# Patient Record
Sex: FEMALE | Race: WHITE | NOT HISPANIC OR LATINO | Employment: OTHER | URBAN - METROPOLITAN AREA
[De-identification: names, ages, dates, MRNs, and addresses within clinical notes are randomized per-mention and may not be internally consistent; named-entity substitution may affect disease eponyms.]

---

## 2017-01-12 ENCOUNTER — TELEPHONE (OUTPATIENT)
Dept: PAIN MEDICINE | Facility: CLINIC | Age: 72
End: 2017-01-12

## 2017-01-20 ENCOUNTER — OFFICE VISIT (OUTPATIENT)
Dept: FAMILY MEDICINE CLINIC | Age: 72
End: 2017-01-20
Payer: MEDICARE

## 2017-01-20 VITALS
BODY MASS INDEX: 35.87 KG/M2 | RESPIRATION RATE: 18 BRPM | DIASTOLIC BLOOD PRESSURE: 70 MMHG | OXYGEN SATURATION: 95 % | WEIGHT: 190 LBS | SYSTOLIC BLOOD PRESSURE: 130 MMHG | HEART RATE: 87 BPM | HEIGHT: 61 IN

## 2017-01-20 DIAGNOSIS — M54.5 CHRONIC MIDLINE LOW BACK PAIN, WITH SCIATICA PRESENCE UNSPECIFIED: ICD-10-CM

## 2017-01-20 DIAGNOSIS — G89.29 CHRONIC MIDLINE LOW BACK PAIN, WITH SCIATICA PRESENCE UNSPECIFIED: ICD-10-CM

## 2017-01-20 DIAGNOSIS — I10 ESSENTIAL HYPERTENSION: ICD-10-CM

## 2017-01-20 DIAGNOSIS — G89.29 CHRONIC PAIN OF BOTH KNEES: Primary | ICD-10-CM

## 2017-01-20 DIAGNOSIS — M25.562 CHRONIC PAIN OF BOTH KNEES: Primary | ICD-10-CM

## 2017-01-20 DIAGNOSIS — M25.561 CHRONIC PAIN OF BOTH KNEES: Primary | ICD-10-CM

## 2017-01-20 DIAGNOSIS — R53.83 FATIGUE, UNSPECIFIED TYPE: ICD-10-CM

## 2017-01-20 PROCEDURE — G8484 FLU IMMUNIZE NO ADMIN: HCPCS | Performed by: FAMILY MEDICINE

## 2017-01-20 PROCEDURE — 3014F SCREEN MAMMO DOC REV: CPT | Performed by: FAMILY MEDICINE

## 2017-01-20 PROCEDURE — G8399 PT W/DXA RESULTS DOCUMENT: HCPCS | Performed by: FAMILY MEDICINE

## 2017-01-20 PROCEDURE — 4040F PNEUMOC VAC/ADMIN/RCVD: CPT | Performed by: FAMILY MEDICINE

## 2017-01-20 PROCEDURE — 99214 OFFICE O/P EST MOD 30 MIN: CPT | Performed by: FAMILY MEDICINE

## 2017-01-20 PROCEDURE — 3017F COLORECTAL CA SCREEN DOC REV: CPT | Performed by: FAMILY MEDICINE

## 2017-01-20 PROCEDURE — 1090F PRES/ABSN URINE INCON ASSESS: CPT | Performed by: FAMILY MEDICINE

## 2017-01-20 PROCEDURE — 1036F TOBACCO NON-USER: CPT | Performed by: FAMILY MEDICINE

## 2017-01-20 PROCEDURE — 1123F ACP DISCUSS/DSCN MKR DOCD: CPT | Performed by: FAMILY MEDICINE

## 2017-01-20 PROCEDURE — G8419 CALC BMI OUT NRM PARAM NOF/U: HCPCS | Performed by: FAMILY MEDICINE

## 2017-01-20 PROCEDURE — G8427 DOCREV CUR MEDS BY ELIG CLIN: HCPCS | Performed by: FAMILY MEDICINE

## 2017-01-23 ENCOUNTER — HOSPITAL ENCOUNTER (OUTPATIENT)
Dept: OTHER | Age: 72
Discharge: HOME | End: 2017-01-23
Attending: FAMILY MEDICINE | Admitting: FAMILY MEDICINE

## 2017-01-23 DIAGNOSIS — I10 ESSENTIAL HYPERTENSION: ICD-10-CM

## 2017-01-23 LAB
A/G RATIO: 1.7 (ref 0.8–2)
ALBUMIN SERPL-MCNC: 4.4 G/DL (ref 3.4–4.8)
ALP BLD-CCNC: 72 U/L (ref 25–100)
ALT SERPL-CCNC: 15 U/L (ref 4–36)
ANION GAP SERPL CALCULATED.3IONS-SCNC: 10 MMOL/L (ref 3–16)
AST SERPL-CCNC: 17 U/L (ref 8–33)
BASOPHILS ABSOLUTE: 0 K/UL (ref 0–0.1)
BASOPHILS RELATIVE PERCENT: 0.3 %
BILIRUB SERPL-MCNC: 0.5 MG/DL (ref 0.3–1.2)
BUN BLDV-MCNC: 17 MG/DL (ref 6–20)
CALCIUM SERPL-MCNC: 10 MG/DL (ref 8.5–10.5)
CHLORIDE BLD-SCNC: 99 MMOL/L (ref 98–107)
CO2: 31 MMOL/L (ref 20–30)
CREAT SERPL-MCNC: 0.9 MG/DL (ref 0.4–1.2)
EOSINOPHILS ABSOLUTE: 0.2 K/UL (ref 0–0.4)
EOSINOPHILS RELATIVE PERCENT: 2.7 %
GFR AFRICAN AMERICAN: >59
GFR NON-AFRICAN AMERICAN: >60
GLOBULIN: 2.6 G/DL
GLUCOSE BLD-MCNC: 99 MG/DL (ref 74–106)
HCT VFR BLD CALC: 40.8 % (ref 37–47)
HEMOGLOBIN: 13.1 G/DL (ref 11.5–16.5)
LYMPHOCYTES ABSOLUTE: 2 K/UL (ref 1.5–4)
LYMPHOCYTES RELATIVE PERCENT: 32.4 % (ref 20–40)
MCH RBC QN AUTO: 31.1 PG (ref 27–32)
MCHC RBC AUTO-ENTMCNC: 32.1 G/DL (ref 31–35)
MCV RBC AUTO: 96.9 FL (ref 80–100)
MONOCYTES ABSOLUTE: 0.6 K/UL (ref 0.2–0.8)
MONOCYTES RELATIVE PERCENT: 9.1 % (ref 3–10)
NEUTROPHILS ABSOLUTE: 3.5 K/UL (ref 2–7.5)
NEUTROPHILS RELATIVE PERCENT: 55.5 %
PDW BLD-RTO: 14.5 % (ref 11–16)
PLATELET # BLD: 218 K/UL (ref 150–400)
PMV BLD AUTO: 11.5 FL (ref 6–10)
POTASSIUM SERPL-SCNC: 4.9 MMOL/L (ref 3.4–5.1)
RBC # BLD: 4.21 M/UL (ref 3.8–5.8)
SODIUM BLD-SCNC: 140 MMOL/L (ref 136–145)
TOTAL PROTEIN: 7 G/DL (ref 6.4–8.3)
WBC # BLD: 6.2 K/UL (ref 4–11)

## 2017-01-24 ENCOUNTER — OFFICE VISIT (OUTPATIENT)
Dept: PAIN MEDICINE | Facility: CLINIC | Age: 72
End: 2017-01-24

## 2017-01-24 VITALS
DIASTOLIC BLOOD PRESSURE: 67 MMHG | RESPIRATION RATE: 18 BRPM | HEIGHT: 61 IN | BODY MASS INDEX: 35.3 KG/M2 | OXYGEN SATURATION: 96 % | TEMPERATURE: 98 F | HEART RATE: 76 BPM | WEIGHT: 187 LBS | SYSTOLIC BLOOD PRESSURE: 114 MMHG

## 2017-01-24 DIAGNOSIS — E66.8 MODERATE OBESITY: ICD-10-CM

## 2017-01-24 DIAGNOSIS — M81.0 OSTEOPOROSIS: ICD-10-CM

## 2017-01-24 DIAGNOSIS — M48.062 SPINAL STENOSIS, LUMBAR REGION, WITH NEUROGENIC CLAUDICATION: ICD-10-CM

## 2017-01-24 DIAGNOSIS — M05.9 RHEUMATOID ARTHRITIS WITH POSITIVE RHEUMATOID FACTOR, INVOLVING UNSPECIFIED SITE (HCC): ICD-10-CM

## 2017-01-24 DIAGNOSIS — M35.3 POLYMYALGIA RHEUMATICA (HCC): ICD-10-CM

## 2017-01-24 DIAGNOSIS — M47.816 SPONDYLOSIS OF LUMBAR REGION WITHOUT MYELOPATHY OR RADICULOPATHY: ICD-10-CM

## 2017-01-24 DIAGNOSIS — M21.70 LEG LENGTH DISCREPANCY: ICD-10-CM

## 2017-01-24 DIAGNOSIS — M47.812 CERVICAL SPONDYLOSIS WITHOUT MYELOPATHY: ICD-10-CM

## 2017-01-24 DIAGNOSIS — J44.9 CHRONIC OBSTRUCTIVE PULMONARY DISEASE, UNSPECIFIED COPD TYPE (HCC): ICD-10-CM

## 2017-01-24 DIAGNOSIS — R53.81 PHYSICAL DECONDITIONING: ICD-10-CM

## 2017-01-24 DIAGNOSIS — Z87.81 HISTORY OF COMPRESSION FRACTURE OF VERTEBRAL COLUMN: ICD-10-CM

## 2017-01-24 PROBLEM — E66.9 MODERATE OBESITY: Status: ACTIVE | Noted: 2017-01-24

## 2017-01-24 PROCEDURE — 95972 ALYS CPLX SP/PN NPGT W/PRGRM: CPT | Performed by: ANESTHESIOLOGY

## 2017-01-24 PROCEDURE — 99214 OFFICE O/P EST MOD 30 MIN: CPT | Performed by: ANESTHESIOLOGY

## 2017-01-24 RX ORDER — CARIPRAZINE 3 MG/1
CAPSULE, GELATIN COATED ORAL
COMMUNITY
Start: 2016-12-19 | End: 2022-02-09

## 2017-01-24 RX ORDER — DULOXETIN HYDROCHLORIDE 60 MG/1
CAPSULE, DELAYED RELEASE ORAL DAILY
COMMUNITY
Start: 2014-08-21 | End: 2017-01-24 | Stop reason: SDUPTHER

## 2017-01-24 RX ORDER — ALBUTEROL SULFATE 2.5 MG/3ML
SOLUTION RESPIRATORY (INHALATION)
COMMUNITY
Start: 2014-04-09

## 2017-01-24 RX ORDER — MULTIVIT-MIN/IRON/FOLIC ACID/K 18-600-40
CAPSULE ORAL
COMMUNITY
Start: 2013-11-04 | End: 2017-01-24

## 2017-01-24 RX ORDER — NAPROXEN SODIUM 220 MG
TABLET ORAL
COMMUNITY
Start: 2014-06-03 | End: 2017-01-24

## 2017-01-24 RX ORDER — ZOLEDRONIC ACID 5 MG/100ML
5 INJECTION, SOLUTION INTRAVENOUS ONCE
Status: ON HOLD | COMMUNITY
End: 2022-04-06

## 2017-01-24 RX ORDER — NEBIVOLOL HYDROCHLORIDE 10 MG/1
TABLET ORAL
COMMUNITY
Start: 2016-11-27 | End: 2022-02-09

## 2017-01-24 RX ORDER — PRAMIPEXOLE DIHYDROCHLORIDE 0.12 MG/1
TABLET ORAL
Status: ON HOLD | COMMUNITY
Start: 2016-11-21 | End: 2022-04-06

## 2017-01-24 RX ORDER — CYCLOBENZAPRINE HCL 10 MG
TABLET ORAL
COMMUNITY
Start: 2016-12-19 | End: 2022-02-09

## 2017-01-24 RX ORDER — GABAPENTIN 400 MG/1
CAPSULE ORAL
COMMUNITY
Start: 2016-11-06 | End: 2022-02-09

## 2017-01-24 RX ORDER — MONTELUKAST SODIUM 10 MG/1
TABLET ORAL DAILY
COMMUNITY
Start: 2013-11-04 | End: 2017-01-24

## 2017-01-24 RX ORDER — PHENOL 1.4 %
AEROSOL, SPRAY (ML) MUCOUS MEMBRANE
COMMUNITY
Start: 2013-11-04

## 2017-01-24 RX ORDER — HYDROCODONE BITARTRATE AND ACETAMINOPHEN 7.5; 325 MG/1; MG/1
TABLET ORAL
COMMUNITY
Start: 2016-12-19 | End: 2022-02-09

## 2017-01-24 RX ORDER — ALPRAZOLAM 1 MG/1
TABLET ORAL
COMMUNITY
Start: 2013-11-04 | End: 2017-01-24

## 2017-01-24 RX ORDER — DULOXETIN HYDROCHLORIDE 60 MG/1
CAPSULE, DELAYED RELEASE ORAL
COMMUNITY
Start: 2016-11-12 | End: 2023-03-16

## 2017-01-24 RX ORDER — TOFACITINIB 11 MG/1
TABLET, FILM COATED, EXTENDED RELEASE ORAL
COMMUNITY
Start: 2016-11-11 | End: 2017-01-24

## 2017-01-24 RX ORDER — TRAMADOL HYDROCHLORIDE 50 MG/1
TABLET ORAL
COMMUNITY
Start: 2015-01-13 | End: 2017-01-24

## 2017-01-24 RX ORDER — ESOMEPRAZOLE MAGNESIUM 40 MG/1
CAPSULE, DELAYED RELEASE ORAL 2 TIMES DAILY
COMMUNITY
Start: 2013-11-04 | End: 2017-01-24

## 2017-01-24 RX ORDER — GABAPENTIN 300 MG/1
CAPSULE ORAL 4 TIMES DAILY
COMMUNITY
Start: 2013-11-04 | End: 2017-01-24 | Stop reason: DRUGHIGH

## 2017-01-24 RX ORDER — TIZANIDINE 2 MG/1
TABLET ORAL
COMMUNITY
Start: 2015-02-03 | End: 2017-01-24

## 2017-01-24 RX ORDER — MONTELUKAST SODIUM 10 MG/1
TABLET ORAL
Status: ON HOLD | COMMUNITY
Start: 2016-12-17 | End: 2022-04-06

## 2017-01-24 RX ORDER — FOLIC ACID 1 MG/1
TABLET ORAL
Status: ON HOLD | COMMUNITY
Start: 2017-01-22 | End: 2022-04-06

## 2017-01-24 RX ORDER — FUROSEMIDE 20 MG/1
TABLET ORAL
COMMUNITY
Start: 2016-12-06 | End: 2023-03-16

## 2017-01-24 NOTE — MR AVS SNAPSHOT
Lisa Crowley   1/24/2017 2:00 PM   Office Visit    Dept Phone:  [phone]   Encounter #:  [id number]    Provider:  Hugh Martinez MD   Department:  [facility]                Your Full Care Plan              Today's Medication Changes          These changes are accurate as of: 1/24/17  2:48 PM.  If you have any questions, ask your nurse or doctor.               Medication(s)that have changed:     DULoxetine 60 MG capsule   Commonly known as:  CYMBALTA   What changed:  Another medication with the same name was removed. Continue taking this medication, and follow the directions you see here.   Changed by:  Jennifer Nur MA       gabapentin 400 MG capsule   Commonly known as:  NEURONTIN   What changed:  Another medication with the same name was removed. Continue taking this medication, and follow the directions you see here.   Changed by:  Jennifer Nur MA       montelukast 10 MG tablet   Commonly known as:  SINGULAIR   What changed:  Another medication with the same name was removed. Continue taking this medication, and follow the directions you see here.   Changed by:  Jennifer Nur MA         Stop taking medication(s)listed here:     ALEVE 220 MG tablet   Generic drug:  naproxen sodium   Stopped by:  Hugh Martinez MD           ALPRAZolam 1 MG tablet   Commonly known as:  XANAX   Stopped by:  Hugh Martinez MD           NEXIUM 40 MG capsule   Generic drug:  esomeprazole   Stopped by:  Hugh Martinez MD           tiZANidine 2 MG tablet   Commonly known as:  ZANAFLEX   Stopped by:  Hugh Martinez MD           traMADol 50 MG tablet   Commonly known as:  ULTRAM   Stopped by:  Hugh Martinez MD           Vitamin D 2000 UNITS capsule   Stopped by:  Hugh Martinez MD           XELJANZ XR 11 MG tablet sustained-release 24 hour   Generic drug:  Tofacitinib Citrate   Stopped by:  Hugh Martinez MD                      Your Updated  Medication List          This list is accurate as of: 1/24/17  2:48 PM.  Always use your most recent med list.                albuterol (2.5 MG/3ML) 0.083% nebulizer solution   Commonly known as:  PROVENTIL       buPROPion  MG 12 hr tablet   Commonly known as:  WELLBUTRIN SR       BYSTOLIC 10 MG tablet   Generic drug:  nebivolol       CALCIUM 600 600 MG tablet   Generic drug:  calcium carbonate       cyclobenzaprine 10 MG tablet   Commonly known as:  FLEXERIL       diclofenac 1 % gel gel   Commonly known as:  VOLTAREN       DULoxetine 60 MG capsule   Commonly known as:  CYMBALTA       folic acid 1 MG tablet   Commonly known as:  FOLVITE       furosemide 20 MG tablet   Commonly known as:  LASIX       gabapentin 400 MG capsule   Commonly known as:  NEURONTIN       HYDROcodone-acetaminophen 7.5-325 MG per tablet   Commonly known as:  NORCO       methotrexate 2.5 MG tablet       montelukast 10 MG tablet   Commonly known as:  SINGULAIR       O2   Commonly known as:  OXYGEN       pramipexole 0.125 MG tablet   Commonly known as:  MIRAPEX       RECLAST 5 MG/100ML solution infusion   Generic drug:  zoledronic acid       tiotropium 18 MCG per inhalation capsule   Commonly known as:  SPIRIVA       VRAYLAR 3 MG capsule   Generic drug:  Cariprazine HCl               You Were Diagnosed With        Codes Comments    Spinal stenosis, lumbar region, with neurogenic claudication     ICD-10-CM: M48.06  ICD-9-CM: 724.03     Spondylosis of lumbar region without myelopathy or radiculopathy     ICD-10-CM: M47.816  ICD-9-CM: 721.3     Cervical spondylosis without myelopathy     ICD-10-CM: M47.812  ICD-9-CM: 721.0     History of compression fracture of vertebral column     ICD-10-CM: Z87.81  ICD-9-CM: V15.51     Rheumatoid arthritis with positive rheumatoid factor, involving unspecified site     ICD-10-CM: M05.9  ICD-9-CM: 714.0     Polymyalgia rheumatica     ICD-10-CM: M35.3  ICD-9-CM: 725     Osteoporosis     ICD-10-CM:  "M81.0  ICD-9-CM: 733.00     Leg length discrepancy     ICD-10-CM: M21.70  ICD-9-CM: 736.81     Moderate obesity     ICD-10-CM: E66.8  ICD-9-CM: 278.00     Chronic obstructive pulmonary disease, unspecified COPD type     ICD-10-CM: J44.9  ICD-9-CM: 496     Physical deconditioning     ICD-10-CM: R53.81  ICD-9-CM: 799.3       Instructions     None    Patient Instructions History      Upcoming Appointments     Visit Type Date Time Department    OFFICE VISIT 1/24/2017  2:00 PM [facility]    OFFICE VISIT 4/18/2017 11:30 AM [facility]      MyChart Signup     Our records indicate that you have declined [facility] SIMTEKhart signup. If you would like to sign up for SIMTEKhart, please email [email or web address] or call [phone] to obtain an activation code.             Other Info from Your Visit           Your Appointments     Apr 18, 2017 11:30 AM EDT   Office Visit with Hugh Martinez MD   [facility] (--)    [address] [facility],  [address] [address] [phone]           Arrive 15 minutes prior to appointment.              Allergies     No Known Allergies      Reason for Visit     Extremity Pain     Hip Pain           Vital Signs     Blood Pressure Pulse Temperature Respirations Height Weight    114/67 (BP Location: Right arm, Patient Position: Sitting) 76 98 °F (36.7 °C) (Temporal Artery ) 18 61\" (154.9 cm) 187 lb (84.8 kg)    Oxygen Saturation Body Mass Index Smoking Status             96% 35.33 kg/m2 Never Smoker         Problems and Diagnoses Noted     Degenerative arthritis of cervical spine    Chronic airway obstruction    History of compression fracture of spine    Leg length discrepancy    Moderate obesity    Osteoporosis    Physical deconditioning    Muscle inflammation    Rheumatoid arthritis with positive rheumatoid factor    Narrowing of spinal canal    Degenerative arthritis of lumbar spine        "

## 2017-01-24 NOTE — LETTER
"January 24, 2017     Yaritza Servin MD  [address] [facility] [address]    Patient: Lisa Crowley   YOB: 1945   Date of Visit: 1/24/2017       Dear Dr. Gareth MD:    Thank you for referring Lisa Crowley to me for evaluation. Below are the relevant portions of my assessment and plan of care.    If you have questions, please do not hesitate to call me. I look forward to following Lisa along with you.         Sincerely,        Hugh Martinez MD        CC: MD Hugh Conti MD  1/24/2017  2:53 PM  Signed  CHIEF COMPLAINT: \"Pain in my left leg that started a couple of months ago.\"    BRIEF HISTORY: Mrs. Lisa Crowley is a 71 y.o. female, who returns to the clinic for evaluation of left lower extremity pain, which started two months ago. Her last office visit with me was on 04/27/2015, for evaluation of severe cervicalgia. Patient underwent on almost two years ago bilateral cervical medial branch rhizotomies at C4, C5, C6: for bilateral cervical facet joints at C4-C5 and C5-C6, with resolution of her chronic cervicalgia.  Current pain level: 6/10 (left leg)  Pain level ranges from 0/10 (lying down) to 10/10 (walking or standing)   Patient complains of left hip, thigh, leg and ankle pain.   Patient complains of intermittent pain, described as aching and burning sensation.   Radiation of pain: radiates into the posterior aspect of the thigh, posterior aspect of the leg and lateral aspect of the ankle.  Pain increases with: Pain increases with standing longer than 5 minutes and ambulating more than 2 minutes.   Pain decreases with sitting and lying.   Patient denies  numbness and weakness in the lower extremities. Patient denies  any new bladder or bowel problems.     Review of previous therapies and additional medical records:  Lisa Crolwey has already failed the following measures, including:   Conservative measures: oral analgesics, opioids, topical analgesics, " massage, physical therapy, ice, chiropractic therapy and TENs   Interventional: as referenced in HPI  Surgical:   Lisa Crowley underwent neurosurgical  consultation with Dr. Aguilera and was found not to be a surgical candidate.  Lisa Crowley  presents with significant comorbidities including severe osteoporosis, COPD, obesity, multiple compression fractures, to name a few.  In terms of current analgesics, Lisa Crowley takes:   Current analgesics:  Flexeril, Cymbalta, gabapentin, Norco, methotrexate. Patient denies side effects from medications.  I have reviewed her Ronnie Report #[id number] consistent with medication reconciliation.    Review of New Diagnostic Studies: None for review    Diagnostic Studies:  X-ray of the lumbar spine flexion and extension views on 01/13/2015, revealed compression fracture of T11 is noted which is 50%. Mild compression of the superior aspect of the T12 and the undersurface of L1. Complete loss of disc space L4-L5 and L5-S1. Increased lower thoracic kyphosis and lumbar lordosis. Flexion and extension images reveal no obvious laxity or instability. There is trace anterolisthesis of L2 from L1 which does not appear to move or sublux. Mild pelvic arthropathy. No pelvic erosion or fracture. Pedicles are otherwise intact. Epidural stimulator device superimposes L5 on the oblique views. Otherwise there is facet arthropathy without pars defect or facet fracture.  XR Thoracic Spine Single AP View 07/22/2014; Dextroscoliosis with thoracic arthropathy. Several lower thoracic and upper lumbar compression fractures which appear chronic. Spinal cord stimulator electrodes are located at the T5-T6 level down to the T7-T8 level.   Venous duplex of the lower extremities on 06/03/2014, unremarkable. No evidence of deep or superficial venous thrombosis in the right or left lower extremity.   MRI of the lumbar spine without contrast on 04/01/2014, revealed lumbar facet hypertrophy from L2-L3  "through L5-S1. Moderate canal stenosis at L3-L4. L4-L5 broad based disc protrusion. Moderate left L4-L5 neuroforaminal stenosis. Possible recent T11 endplate deformity. Old L1 compression fracture.  Lumbar Spine DXA 8/13/2012 reveals osteoporosis and high fracture risk.  Left Hip DXA 8/13/2012 reveals osteoporosis and high fracture risk.  MRI of the lumbar spine without contrast 2007 revealed compression fracture at T12-L1. Multilevel degenerative changes of lumbar facet arthropathy.     Review of Systems   Respiratory: Positive for apnea and shortness of breath.    Musculoskeletal: Positive for arthralgias, back pain, gait problem, joint swelling and myalgias.   Neurological: Positive for dizziness.   Hematological: Bruises/bleeds easily.   Psychiatric/Behavioral: Positive for agitation and sleep disturbance. The patient is nervous/anxious.    All other systems reviewed and are negative.     The following portions of the patient's history were reviewed and updated as appropriate: problem list, past medical history, past surgery history, social history, family history, medications, and allergies     Visit Vitals   • /67 (BP Location: Right arm, Patient Position: Sitting)   • Pulse 76   • Temp 98 °F (36.7 °C) (Temporal Artery )   • Resp 18   • Ht 61\" (154.9 cm)   • Wt 187 lb (84.8 kg)   • SpO2 96%   • BMI 35.33 kg/m2      Physical Exam   Neurologic Exam   General appearance: No acute distress, well appearing and well nourished. Appears healthy, obese, well hydrated and appearance reflects stated age.   Head and face: Normal. Palpation of the face and sinuses: No sinus tenderness. Conjunctiva and lids: No swelling, erythema or discharge. Pupils: Equal, round, reactive to light.   Neck: Supple, symmetric, trachea midline, no masses. The neck was normal and was supple.   PulmonaryRespiratory effort: No increased work of breathing or signs of respiratory distress. Respiratory rate: normal. Assessment of respiratory " effort revealed normal rhythm and effort. Auscultation of lungs: Clear to auscultation.   Cardiovascular Auscultation of heart: Normal rate and rhythm, normal S1 and S2, no murmurs. Peripheral vascular exam: Normal. Examination of extremities for edema and/or varicosities: Normal.   Musculoskeletal: Gait and station: Normal. Range of motion of the cervical spine is limited secondary to pain. Extension, lateral flexion, and rotation of the cervical spine increased and reproduces her neck pain. Cervical facet joint loading maneuvers are positive. The range of motion of the glenohumeral joints is essentially full and without pain. Rotator cuff strength is 5/5. The range of motion of the lumbar spine is almost full and without pain. Roly and Gaenslen's tests are negative at this time. Palpation of the left greater trochanter and left gluteal bursa does not reproduce pain. Muscle strength/tone: Normal.   Skin: Normal without rashes or lesions. Both surgical wound wounds look pristine without any erythema, drainage, or fluid accumulation.   Neurologic   Cranial nerves: Cranial nerves II-XII intact.   Cortical function: Normal mental status.   Reflexes: 2+ and symmetric. Spurling sign is negative. Lhermitte sign is negative. Straight leg raising test is negative.   Sensation: No sensory loss.   Coordination: Normal finger to nose and heel to shin.   Psychiatric   Judgment and insight: Normal. Judgment: intact judgment and intact insight. The speech exhibited a normal rhythm, normal tone, coherency, normal articulation, normal volume and a normal rate. Thought Processes: normal. Evaluation of Associations: no deficiency. Abnormal Thoughts: no abnormal thoughts reported.   Orientation to person, place, and time: Normal. Mental Status: oriented to person, place, and time, normal attention skills, normal language skills and normal fund of knowledge.   Recent and remote memory: Intact. Memory: intact short term  memory, intact long term memory and intact recall.   Mood and affect: Normal. Mood and Affect: appropriate mood and appropriate affect.       Procedure: Analysis of the spinal cord stimulator device with complex spinal cord stimulator reprogramming  Patient used the [employer] spinal cord stimulator device for 7518 hours since last reprogramming, and 13,258 lifetime hours (average 48% of the time)  Analysis of impedence reveals normal impedence for all contacts.  The spinal cord stimulator device was reprogrammed under my direct supervision and one program was created, and one deleted, by adjusting electrode polarities, amplitude, pulse width, and pulse rate, for a total of two programs, in the following fashion;  Program A1:   Electrode polarities: 14+, 15-  Amplitudes programmed: 1.8 V (0.1-10.5)  Pulse width: 720 mcs.  Pulse Rate: 60 Hz.    Program B1:   Electrode polarities: 5+, 6+, 7-,  13+,  14+,  15-   Amplitudes programmed: 0 V (0.1-10.5)  Pulse width: 600 mcs.  Pulse Rate: 60 Hz.    Patient experienced significant pain relief with coverage with pleasant paresthesia in all areas of her chronic pain.  Walking ability test performed revealing significant improvement of previously severe neurogenic claudication.  Time spent reprogrammin minutes  A copy of the telemetry report will be scanned in the patient's chart.    ASSESSMENT:   1. Spinal stenosis, lumbar region, with neurogenic claudication    2. Spondylosis of lumbar region without myelopathy or radiculopathy    3. Cervical spondylosis without myelopathy    4. History of compression fracture of vertebral column    5. Rheumatoid arthritis with positive rheumatoid factor, involving unspecified site    6. Polymyalgia rheumatica    7. Osteoporosis    8. Leg length discrepancy    9. Moderate obesity    10. Chronic obstructive pulmonary disease, unspecified COPD type    11. Physical deconditioning      PLAN: Patient returns to the clinic with recurrent left  lower extremity pain. She was doing quite well until two months ago with the use of her SCS device. Her last visit with me was for treatment of her neck, as referenced under HPI. I have reviewed all available patient's medical records as well as previous therapies as referenced above under history of present illness. Patient experienced significant pain relief after today's reprogramming. Continue current management and any additional workup, referrals, and treatments as outlined in the following plan:  1. Follow-up in 12 weeks.   2. Pharmacological measures: Continue Flexeril, Cymbalta, gabapentin, Norco, and methotrexate, as currently prescribed.  3. Long term rehabilitation efforts:  A. Wear 0.5 cm right shoe lift/custom-made orthotics  B. Follow-up with [facility]  C. I have recommended water therapy  D. Patient declined physical therapy   E. Patient declined follow-up with Dr. Swartz for CBT/anxiety  4. The patient has been instructed to contact my office with any questions or difficulties. The patient understands the plan and agrees to proceed accordingly.      Patient Care Team:  Yaritza Servin MD as PCP - General  Faraz Aguilera MD as Surgeon (Neurosurgery)  Hugh Martinez MD as Consulting Physician (Anesthesiology)     New Medications Ordered This Visit   Medications   • O2 (OXYGEN)     Sig: Inhale 2 L into the lungs nightly   • tiotropium (SPIRIVA) 18 MCG per inhalation capsule     Sig: Place 1 capsule into inhaler and inhale Daily.   • zoledronic acid (RECLAST) 5 MG/100ML solution infusion     Sig: Infuse 5 mg into a venous catheter 1 (One) Time.         No future appointments.      Hugh Martinez MD       EMR Dragon/Transcription disclaimer:  Much of this encounter note is an electronic transcription of spoken language to printed text. Electronic transcription of spoken language may permit erroneous, or at times, nonsensical words or phrases to be inadvertently  transcribed. Although I have reviewed the note for such errors, some may still exist.

## 2017-01-24 NOTE — PROGRESS NOTES
"CHIEF COMPLAINT: \"Pain in my left leg that started a couple of months ago.\"    BRIEF HISTORY: Mrs. Lisa Crowley is a 71 y.o. female, who returns to the clinic for evaluation of left lower extremity pain, which started two months ago. Her last office visit with me was on 04/27/2015, for evaluation of severe cervicalgia. Patient underwent on almost two years ago bilateral cervical medial branch rhizotomies at C4, C5, C6: for bilateral cervical facet joints at C4-C5 and C5-C6, with resolution of her chronic cervicalgia.  Current pain level: 6/10 (left leg)  Pain level ranges from 0/10 (lying down) to 10/10 (walking or standing)   Patient complains of left hip, thigh, leg and ankle pain.   Patient complains of intermittent pain, described as aching and burning sensation.   Radiation of pain: radiates into the posterior aspect of the thigh, posterior aspect of the leg and lateral aspect of the ankle.  Pain increases with: Pain increases with standing longer than 5 minutes and ambulating more than 2 minutes.   Pain decreases with sitting and lying.   Patient denies  numbness and weakness in the lower extremities. Patient denies  any new bladder or bowel problems.     Review of previous therapies and additional medical records:  Lisa Crowley has already failed the following measures, including:   Conservative measures: oral analgesics, opioids, topical analgesics, massage, physical therapy, ice, chiropractic therapy and TENs   Interventional: as referenced in HPI  Surgical:   Lisa Crowley underwent neurosurgical  consultation with Dr. Aguilera and was found not to be a surgical candidate.  Lisa Crowley  presents with significant comorbidities including severe osteoporosis, COPD, obesity, multiple compression fractures, to name a few.  In terms of current analgesics, Lisa Crowley takes:   Current analgesics:  Flexeril, Cymbalta, gabapentin, Norco, methotrexate. Patient denies side effects from " medications.  I have reviewed her [facility] Report #[id number] consistent with medication reconciliation.    Review of New Diagnostic Studies: None for review    Diagnostic Studies:  X-ray of the lumbar spine flexion and extension views on 01/13/2015, revealed compression fracture of T11 is noted which is 50%. Mild compression of the superior aspect of the T12 and the undersurface of L1. Complete loss of disc space L4-L5 and L5-S1. Increased lower thoracic kyphosis and lumbar lordosis. Flexion and extension images reveal no obvious laxity or instability. There is trace anterolisthesis of L2 from L1 which does not appear to move or sublux. Mild pelvic arthropathy. No pelvic erosion or fracture. Pedicles are otherwise intact. Epidural stimulator device superimposes L5 on the oblique views. Otherwise there is facet arthropathy without pars defect or facet fracture.  XR Thoracic Spine Single AP View 07/22/2014; Dextroscoliosis with thoracic arthropathy. Several lower thoracic and upper lumbar compression fractures which appear chronic. Spinal cord stimulator electrodes are located at the T5-T6 level down to the T7-T8 level.   Venous duplex of the lower extremities on 06/03/2014, unremarkable. No evidence of deep or superficial venous thrombosis in the right or left lower extremity.   MRI of the lumbar spine without contrast on 04/01/2014, revealed lumbar facet hypertrophy from L2-L3 through L5-S1. Moderate canal stenosis at L3-L4. L4-L5 broad based disc protrusion. Moderate left L4-L5 neuroforaminal stenosis. Possible recent T11 endplate deformity. Old L1 compression fracture.  Lumbar Spine DXA 8/13/2012 reveals osteoporosis and high fracture risk.  Left Hip DXA 8/13/2012 reveals osteoporosis and high fracture risk.  MRI of the lumbar spine without contrast 2007 revealed compression fracture at T12-L1. Multilevel degenerative changes of lumbar facet arthropathy.     Review of Systems   Respiratory: Positive for apnea and  "shortness of breath.    Musculoskeletal: Positive for arthralgias, back pain, gait problem, joint swelling and myalgias.   Neurological: Positive for dizziness.   Hematological: Bruises/bleeds easily.   Psychiatric/Behavioral: Positive for agitation and sleep disturbance. The patient is nervous/anxious.    All other systems reviewed and are negative.     The following portions of the patient's history were reviewed and updated as appropriate: problem list, past medical history, past surgery history, social history, family history, medications, and allergies     Visit Vitals   • /67 (BP Location: Right arm, Patient Position: Sitting)   • Pulse 76   • Temp 98 °F (36.7 °C) (Temporal Artery )   • Resp 18   • Ht 61\" (154.9 cm)   • Wt 187 lb (84.8 kg)   • SpO2 96%   • BMI 35.33 kg/m2      Physical Exam   Neurologic Exam   General appearance: No acute distress, well appearing and well nourished. Appears healthy, obese, well hydrated and appearance reflects stated age.   Head and face: Normal. Palpation of the face and sinuses: No sinus tenderness. Conjunctiva and lids: No swelling, erythema or discharge. Pupils: Equal, round, reactive to light.   Neck: Supple, symmetric, trachea midline, no masses. The neck was normal and was supple.   PulmonaryRespiratory effort: No increased work of breathing or signs of respiratory distress. Respiratory rate: normal. Assessment of respiratory effort revealed normal rhythm and effort. Auscultation of lungs: Clear to auscultation.   Cardiovascular Auscultation of heart: Normal rate and rhythm, normal S1 and S2, no murmurs. Peripheral vascular exam: Normal. Examination of extremities for edema and/or varicosities: Normal.   Musculoskeletal: Gait and station: Normal. Range of motion of the cervical spine is limited secondary to pain. Extension, lateral flexion, and rotation of the cervical spine increased and reproduces her neck pain. Cervical facet joint loading maneuvers are " positive. The range of motion of the glenohumeral joints is essentially full and without pain. Rotator cuff strength is 5/5. The range of motion of the lumbar spine is almost full and without pain. Roly and Gaenslen's tests are negative at this time. Palpation of the left greater trochanter and left gluteal bursa does not reproduce pain. Muscle strength/tone: Normal.   Skin: Normal without rashes or lesions. Both surgical wound wounds look pristine without any erythema, drainage, or fluid accumulation.   Neurologic   Cranial nerves: Cranial nerves II-XII intact.   Cortical function: Normal mental status.   Reflexes: 2+ and symmetric. Spurling sign is negative. Lhermitte sign is negative. Straight leg raising test is negative.   Sensation: No sensory loss.   Coordination: Normal finger to nose and heel to shin.   Psychiatric   Judgment and insight: Normal. Judgment: intact judgment and intact insight. The speech exhibited a normal rhythm, normal tone, coherency, normal articulation, normal volume and a normal rate. Thought Processes: normal. Evaluation of Associations: no deficiency. Abnormal Thoughts: no abnormal thoughts reported.   Orientation to person, place, and time: Normal. Mental Status: oriented to person, place, and time, normal attention skills, normal language skills and normal fund of knowledge.   Recent and remote memory: Intact. Memory: intact short term memory, intact long term memory and intact recall.   Mood and affect: Normal. Mood and Affect: appropriate mood and appropriate affect.       Procedure: Analysis of the spinal cord stimulator device with complex spinal cord stimulator reprogramming  Patient used the Medtronic spinal cord stimulator device for 7518 hours since last reprogramming, and 13,258 lifetime hours (average 48% of the time)  Analysis of impedence reveals normal impedence for all contacts.  The spinal cord stimulator device was reprogrammed under my direct supervision and one  program was created, and one deleted, by adjusting electrode polarities, amplitude, pulse width, and pulse rate, for a total of two programs, in the following fashion;  Program A1:   Electrode polarities: 14+, 15-  Amplitudes programmed: 1.8 V (0.1-10.5)  Pulse width: 720 mcs.  Pulse Rate: 60 Hz.    Program B1:   Electrode polarities: 5+, 6+, 7-,  13+,  14+,  15-   Amplitudes programmed: 0 V (0.1-10.5)  Pulse width: 600 mcs.  Pulse Rate: 60 Hz.    Patient experienced significant pain relief with coverage with pleasant paresthesia in all areas of her chronic pain.  Walking ability test performed revealing significant improvement of previously severe neurogenic claudication.  Time spent reprogrammin minutes  A copy of the telemetry report will be scanned in the patient's chart.    ASSESSMENT:   1. Spinal stenosis, lumbar region, with neurogenic claudication    2. Spondylosis of lumbar region without myelopathy or radiculopathy    3. Cervical spondylosis without myelopathy    4. History of compression fracture of vertebral column    5. Rheumatoid arthritis with positive rheumatoid factor, involving unspecified site    6. Polymyalgia rheumatica    7. Osteoporosis    8. Leg length discrepancy    9. Moderate obesity    10. Chronic obstructive pulmonary disease, unspecified COPD type    11. Physical deconditioning      PLAN: Patient returns to the clinic with recurrent left lower extremity pain. She was doing quite well until two months ago with the use of her SCS device. Her last visit with me was for treatment of her neck, as referenced under HPI. I have reviewed all available patient's medical records as well as previous therapies as referenced above under history of present illness. Patient experienced significant pain relief after today's reprogramming. Continue current management and any additional workup, referrals, and treatments as outlined in the following plan:  1. Follow-up in 12 weeks.   2.  Pharmacological measures: Continue Flexeril, Cymbalta, gabapentin, Norco, and methotrexate, as currently prescribed.  3. Long term rehabilitation efforts:  A. Wear 0.5 cm right shoe lift/custom-made orthotics  B. Follow-up with [facility]  C. I have recommended water therapy  D. Patient declined physical therapy   E. Patient declined follow-up with Dr. Swartz for CBT/anxiety  4. The patient has been instructed to contact my office with any questions or difficulties. The patient understands the plan and agrees to proceed accordingly.      Patient Care Team:  Yaritza Servin MD as PCP - General  Faraz Aguilera MD as Surgeon (Neurosurgery)  Hugh Martinez MD as Consulting Physician (Anesthesiology)     New Medications Ordered This Visit   Medications   • O2 (OXYGEN)     Sig: Inhale 2 L into the lungs nightly   • tiotropium (SPIRIVA) 18 MCG per inhalation capsule     Sig: Place 1 capsule into inhaler and inhale Daily.   • zoledronic acid (RECLAST) 5 MG/100ML solution infusion     Sig: Infuse 5 mg into a venous catheter 1 (One) Time.         No future appointments.      Hugh Martinez MD       EMR Dragon/Transcription disclaimer:  Much of this encounter note is an electronic transcription of spoken language to printed text. Electronic transcription of spoken language may permit erroneous, or at times, nonsensical words or phrases to be inadvertently transcribed. Although I have reviewed the note for such errors, some may still exist.

## 2017-02-08 RX ORDER — DULOXETIN HYDROCHLORIDE 60 MG/1
CAPSULE, DELAYED RELEASE ORAL
Qty: 90 CAPSULE | Refills: 1 | Status: SHIPPED | OUTPATIENT
Start: 2017-02-08 | End: 2017-08-07 | Stop reason: SDUPTHER

## 2017-04-17 ENCOUNTER — OFFICE VISIT (OUTPATIENT)
Dept: FAMILY MEDICINE CLINIC | Age: 72
End: 2017-04-17
Payer: MEDICARE

## 2017-04-17 ENCOUNTER — HOSPITAL ENCOUNTER (OUTPATIENT)
Dept: OTHER | Age: 72
Discharge: HOME | End: 2017-04-17
Attending: FAMILY MEDICINE | Admitting: FAMILY MEDICINE

## 2017-04-17 VITALS
BODY MASS INDEX: 35.12 KG/M2 | OXYGEN SATURATION: 93 % | HEIGHT: 61 IN | DIASTOLIC BLOOD PRESSURE: 84 MMHG | WEIGHT: 186 LBS | HEART RATE: 62 BPM | RESPIRATION RATE: 18 BRPM | SYSTOLIC BLOOD PRESSURE: 124 MMHG

## 2017-04-17 DIAGNOSIS — I10 ESSENTIAL HYPERTENSION: ICD-10-CM

## 2017-04-17 DIAGNOSIS — G89.29 CHRONIC PAIN OF LEFT KNEE: Primary | ICD-10-CM

## 2017-04-17 DIAGNOSIS — J44.9 CHRONIC OBSTRUCTIVE PULMONARY DISEASE, UNSPECIFIED COPD TYPE (HCC): ICD-10-CM

## 2017-04-17 DIAGNOSIS — M25.562 CHRONIC PAIN OF LEFT KNEE: Primary | ICD-10-CM

## 2017-04-17 DIAGNOSIS — M19.90 OSTEOARTHRITIS, UNSPECIFIED OSTEOARTHRITIS TYPE, UNSPECIFIED SITE: ICD-10-CM

## 2017-04-17 LAB
A/G RATIO: 1.9 (ref 0.8–2)
ALBUMIN SERPL-MCNC: 4.7 G/DL (ref 3.4–4.8)
ALP BLD-CCNC: 66 U/L (ref 25–100)
ALT SERPL-CCNC: 16 U/L (ref 4–36)
ANION GAP SERPL CALCULATED.3IONS-SCNC: 13 MMOL/L (ref 3–16)
AST SERPL-CCNC: 19 U/L (ref 8–33)
BASOPHILS ABSOLUTE: 0 K/UL (ref 0–0.1)
BASOPHILS RELATIVE PERCENT: 0.4 %
BILIRUB SERPL-MCNC: 0.5 MG/DL (ref 0.3–1.2)
BUN BLDV-MCNC: 24 MG/DL (ref 6–20)
CALCIUM SERPL-MCNC: 9.3 MG/DL (ref 8.5–10.5)
CHLORIDE BLD-SCNC: 99 MMOL/L (ref 98–107)
CO2: 29 MMOL/L (ref 20–30)
CREAT SERPL-MCNC: 1.1 MG/DL (ref 0.4–1.2)
EOSINOPHILS ABSOLUTE: 0.2 K/UL (ref 0–0.4)
EOSINOPHILS RELATIVE PERCENT: 2.7 %
GFR AFRICAN AMERICAN: >59
GFR NON-AFRICAN AMERICAN: 49
GLOBULIN: 2.5 G/DL
GLUCOSE BLD-MCNC: 91 MG/DL (ref 74–106)
HCT VFR BLD CALC: 39 % (ref 37–47)
HEMOGLOBIN: 13.1 G/DL (ref 11.5–16.5)
LYMPHOCYTES ABSOLUTE: 2.1 K/UL (ref 1.5–4)
LYMPHOCYTES RELATIVE PERCENT: 29 % (ref 20–40)
MCH RBC QN AUTO: 33 PG (ref 27–32)
MCHC RBC AUTO-ENTMCNC: 33.6 G/DL (ref 31–35)
MCV RBC AUTO: 98.2 FL (ref 80–100)
MONOCYTES ABSOLUTE: 0.7 K/UL (ref 0.2–0.8)
MONOCYTES RELATIVE PERCENT: 9.1 % (ref 3–10)
NEUTROPHILS ABSOLUTE: 4.2 K/UL (ref 2–7.5)
NEUTROPHILS RELATIVE PERCENT: 58.8 %
PDW BLD-RTO: 13.6 % (ref 11–16)
PLATELET # BLD: 189 K/UL (ref 150–400)
PMV BLD AUTO: 12 FL (ref 6–10)
POTASSIUM SERPL-SCNC: 4 MMOL/L (ref 3.4–5.1)
RBC # BLD: 3.97 M/UL (ref 3.8–5.8)
SODIUM BLD-SCNC: 141 MMOL/L (ref 136–145)
TOTAL PROTEIN: 7.2 G/DL (ref 6.4–8.3)
WBC # BLD: 7.1 K/UL (ref 4–11)

## 2017-04-17 PROCEDURE — G8417 CALC BMI ABV UP PARAM F/U: HCPCS | Performed by: FAMILY MEDICINE

## 2017-04-17 PROCEDURE — 3014F SCREEN MAMMO DOC REV: CPT | Performed by: FAMILY MEDICINE

## 2017-04-17 PROCEDURE — 1090F PRES/ABSN URINE INCON ASSESS: CPT | Performed by: FAMILY MEDICINE

## 2017-04-17 PROCEDURE — 4040F PNEUMOC VAC/ADMIN/RCVD: CPT | Performed by: FAMILY MEDICINE

## 2017-04-17 PROCEDURE — 20610 DRAIN/INJ JOINT/BURSA W/O US: CPT | Performed by: FAMILY MEDICINE

## 2017-04-17 PROCEDURE — G8427 DOCREV CUR MEDS BY ELIG CLIN: HCPCS | Performed by: FAMILY MEDICINE

## 2017-04-17 PROCEDURE — 3023F SPIROM DOC REV: CPT | Performed by: FAMILY MEDICINE

## 2017-04-17 PROCEDURE — G8399 PT W/DXA RESULTS DOCUMENT: HCPCS | Performed by: FAMILY MEDICINE

## 2017-04-17 PROCEDURE — 99214 OFFICE O/P EST MOD 30 MIN: CPT | Performed by: FAMILY MEDICINE

## 2017-04-17 PROCEDURE — 1036F TOBACCO NON-USER: CPT | Performed by: FAMILY MEDICINE

## 2017-04-17 PROCEDURE — G8926 SPIRO NO PERF OR DOC: HCPCS | Performed by: FAMILY MEDICINE

## 2017-04-17 PROCEDURE — 3017F COLORECTAL CA SCREEN DOC REV: CPT | Performed by: FAMILY MEDICINE

## 2017-04-17 PROCEDURE — 1123F ACP DISCUSS/DSCN MKR DOCD: CPT | Performed by: FAMILY MEDICINE

## 2017-04-17 RX ORDER — FLUTICASONE FUROATE AND VILANTEROL 200; 25 UG/1; UG/1
1 POWDER RESPIRATORY (INHALATION) DAILY
Qty: 1 EACH | Refills: 5 | Status: SHIPPED | OUTPATIENT
Start: 2017-04-17 | End: 2017-10-10 | Stop reason: SDUPTHER

## 2017-04-17 RX ORDER — METHYLPREDNISOLONE ACETATE 40 MG/ML
40 INJECTION, SUSPENSION INTRA-ARTICULAR; INTRALESIONAL; INTRAMUSCULAR; SOFT TISSUE ONCE
Status: COMPLETED | OUTPATIENT
Start: 2017-04-17 | End: 2017-04-17

## 2017-04-17 RX ORDER — LIDOCAINE HYDROCHLORIDE 10 MG/ML
1 INJECTION, SOLUTION INFILTRATION; PERINEURAL ONCE
Status: COMPLETED | OUTPATIENT
Start: 2017-04-17 | End: 2017-04-17

## 2017-04-17 RX ORDER — ROPINIROLE 1 MG/1
1 TABLET, FILM COATED ORAL 3 TIMES DAILY
Qty: 90 TABLET | Refills: 3 | Status: SHIPPED | OUTPATIENT
Start: 2017-04-17 | End: 2017-07-17 | Stop reason: SDUPTHER

## 2017-04-17 RX ADMIN — LIDOCAINE HYDROCHLORIDE 1 ML: 10 INJECTION, SOLUTION INFILTRATION; PERINEURAL at 10:33

## 2017-04-17 RX ADMIN — METHYLPREDNISOLONE ACETATE 40 MG: 40 INJECTION, SUSPENSION INTRA-ARTICULAR; INTRALESIONAL; INTRAMUSCULAR; SOFT TISSUE at 10:32

## 2017-04-18 ENCOUNTER — OFFICE VISIT (OUTPATIENT)
Dept: PAIN MEDICINE | Facility: CLINIC | Age: 72
End: 2017-04-18

## 2017-04-18 VITALS
HEIGHT: 61 IN | WEIGHT: 185 LBS | BODY MASS INDEX: 34.93 KG/M2 | RESPIRATION RATE: 18 BRPM | SYSTOLIC BLOOD PRESSURE: 137 MMHG | HEART RATE: 70 BPM | DIASTOLIC BLOOD PRESSURE: 88 MMHG | OXYGEN SATURATION: 95 % | TEMPERATURE: 97.5 F

## 2017-04-18 DIAGNOSIS — Z87.81 HISTORY OF COMPRESSION FRACTURE OF VERTEBRAL COLUMN: ICD-10-CM

## 2017-04-18 DIAGNOSIS — R53.81 PHYSICAL DECONDITIONING: ICD-10-CM

## 2017-04-18 DIAGNOSIS — M47.816 SPONDYLOSIS OF LUMBAR REGION WITHOUT MYELOPATHY OR RADICULOPATHY: ICD-10-CM

## 2017-04-18 DIAGNOSIS — M35.3 POLYMYALGIA RHEUMATICA (HCC): ICD-10-CM

## 2017-04-18 DIAGNOSIS — M47.812 CERVICAL SPONDYLOSIS WITHOUT MYELOPATHY: ICD-10-CM

## 2017-04-18 DIAGNOSIS — M21.70 LEG LENGTH DISCREPANCY: ICD-10-CM

## 2017-04-18 DIAGNOSIS — J43.9 PULMONARY EMPHYSEMA, UNSPECIFIED EMPHYSEMA TYPE (HCC): ICD-10-CM

## 2017-04-18 DIAGNOSIS — M05.711 RHEUMATOID ARTHRITIS INVOLVING RIGHT SHOULDER WITH POSITIVE RHEUMATOID FACTOR (HCC): ICD-10-CM

## 2017-04-18 DIAGNOSIS — M81.0 OSTEOPOROSIS: ICD-10-CM

## 2017-04-18 DIAGNOSIS — E66.8 MODERATE OBESITY: ICD-10-CM

## 2017-04-18 DIAGNOSIS — M48.062 SPINAL STENOSIS, LUMBAR REGION, WITH NEUROGENIC CLAUDICATION: ICD-10-CM

## 2017-04-18 PROCEDURE — 95970 ALYS NPGT W/O PRGRMG: CPT | Performed by: ANESTHESIOLOGY

## 2017-04-18 PROCEDURE — 99212 OFFICE O/P EST SF 10 MIN: CPT | Performed by: ANESTHESIOLOGY

## 2017-04-18 RX ORDER — ROPINIROLE 3 MG/1
3 TABLET, FILM COATED ORAL 4 TIMES DAILY
Status: ON HOLD | COMMUNITY
Start: 2017-04-17 | End: 2022-04-06

## 2017-04-18 NOTE — PROGRESS NOTES
"CHIEF COMPLAINT: \"I am doing great. I have no pain.\"    BRIEF HISTORY: Mrs. Lisa Crowley is a 71 y.o. female, who returns to the clinic for follow-up evaluation of left lower extremity pain. Patient reports that her pain has resolved since last reprogramming. Previously, in 2015, patient underwent bilateral cervical medial branch rhizotomies at C4, C5, C6: for bilateral cervical facet joints at C4-C5 and C5-C6, with resolution of her chronic cervicalgia.  Current pain level: 0/10   Pain level ranges from 0/10 (lying down or walking or standing)   Patient denies  any pain, numbness or weakness in the lower extremities. Patient denies  any new bladder or bowel problems.     Review of previous therapies and additional medical records:  Lisa Crowley has already failed the following measures, including:   Conservative measures: oral analgesics, opioids, topical analgesics, massage, physical therapy, ice, chiropractic therapy and TENs   Interventional: as referenced in HPI  Surgical: None  Lisa Crowley underwent neurosurgical  consultation with Dr. Aguilera and was found not to be a surgical candidate.  Lisa Crowley  presents with significant comorbidities including severe osteoporosis, COPD, obesity, multiple compression fractures, to name a few.  In terms of current analgesics, Lisa Crowley takes: gabapentin, methotrexate. Patient denies side effects from medications.  I have reviewed her [facility] Report #[id number] consistent with medication reconciliation.    Diagnostic Studies:  X-ray of the lumbar spine flexion and extension views on 01/13/2015, revealed compression fracture of T11 is noted which is 50%. Mild compression of the superior aspect of the T12 and the undersurface of L1. Complete loss of disc space L4-L5 and L5-S1. Increased lower thoracic kyphosis and lumbar lordosis. Flexion and extension images reveal no obvious laxity or instability. There is trace anterolisthesis of L2 from L1 which " "does not appear to move or sublux. Mild pelvic arthropathy. No pelvic erosion or fracture. Pedicles are otherwise intact. Epidural stimulator device superimposes L5 on the oblique views. Otherwise there is facet arthropathy without pars defect or facet fracture.  XR Thoracic Spine Single AP View 07/22/2014; Dextroscoliosis with thoracic arthropathy. Several lower thoracic and upper lumbar compression fractures which appear chronic. Spinal cord stimulator electrodes are located at the T5-T6 level down to the T7-T8 level.   Venous duplex of the lower extremities on 06/03/2014, unremarkable. No evidence of deep or superficial venous thrombosis in the right or left lower extremity.   MRI of the lumbar spine without contrast on 04/01/2014, revealed lumbar facet hypertrophy from L2-L3 through L5-S1. Moderate canal stenosis at L3-L4. L4-L5 broad based disc protrusion. Moderate left L4-L5 neuroforaminal stenosis. Possible recent T11 endplate deformity. Old L1 compression fracture.  Lumbar Spine DXA 8/13/2012 reveals osteoporosis and high fracture risk.  Left Hip DXA 8/13/2012 reveals osteoporosis and high fracture risk.  MRI of the lumbar spine without contrast 2007 revealed compression fracture at T12-L1. Multilevel degenerative changes of lumbar facet arthropathy.     Review of Systems   All other systems reviewed and are negative.     The following portions of the patient's history were reviewed and updated as appropriate: problem list, past medical history, past surgery history, social history, family history, medications, and allergies     /88 (BP Location: Left arm, Patient Position: Sitting)  Pulse 70  Temp 97.5 °F (36.4 °C) (Temporal Artery )   Resp 18  Ht 61\" (154.9 cm)  Wt 185 lb (83.9 kg)  SpO2 95%  BMI 34.96 kg/m2     Physical Exam   Neurologic Exam   General appearance: No acute distress, well appearing and well nourished. Appears healthy, obese, well hydrated and appearance reflects stated age. "   Head and face: Normal. Palpation of the face and sinuses: No sinus tenderness. Conjunctiva and lids: No swelling, erythema or discharge. Pupils: Equal, round, reactive to light.   Neck: Supple, symmetric, trachea midline, no masses. The neck was normal and was supple.   PulmonaryRespiratory effort: No increased work of breathing or signs of respiratory distress. Respiratory rate: normal. Assessment of respiratory effort revealed normal rhythm and effort. Auscultation of lungs: Clear to auscultation.   Cardiovascular Auscultation of heart: Normal rate and rhythm, normal S1 and S2, no murmurs. Peripheral vascular exam: Normal. Examination of extremities for edema and/or varicosities: Normal.   Musculoskeletal: Gait and station: Normal. Range of motion of the cervical spine is limited secondary to pain. Extension, lateral flexion, and rotation of the cervical spine increased and reproduces her neck pain. Cervical facet joint loading maneuvers are positive. The range of motion of the glenohumeral joints is essentially full and without pain. Rotator cuff strength is 5/5. The range of motion of the lumbar spine is almost full and without pain. Roly and Gaenslen's tests are negative at this time. Palpation of the left greater trochanter and left gluteal bursa does not reproduce pain. Muscle strength/tone: Normal.   Skin: Normal without rashes or lesions. Both surgical wound wounds look pristine without any erythema, drainage, or fluid accumulation.   Neurologic   Cranial nerves: Cranial nerves II-XII intact.   Cortical function: Normal mental status.   Reflexes: 2+ and symmetric. Spurling sign is negative. Lhermitte sign is negative. Straight leg raising test is negative.   Sensation: No sensory loss.   Coordination: Normal finger to nose and heel to shin.   Psychiatric   Judgment and insight: Normal. Judgment: intact judgment and intact insight. The speech exhibited a normal rhythm, normal tone, coherency, normal  articulation, normal volume and a normal rate. Thought Processes: normal. Evaluation of Associations: no deficiency. Abnormal Thoughts: no abnormal thoughts reported.   Orientation to person, place, and time: Normal. Mental Status: oriented to person, place, and time, normal attention skills, normal language skills and normal fund of knowledge.   Recent and remote memory: Intact. Memory: intact short term memory, intact long term memory and intact recall.   Mood and affect: Normal. Mood and Affect: appropriate mood and appropriate affect.       Procedure: Analysis of the spinal cord stimulator device without spinal cord stimulator reprogramming  Patient used the [employer]tronic spinal cord stimulator device for 211 hours since last reprogramming, and 13,470 lifetime hours (average 10% of the time)  Analysis of impedence reveals normal impedence for all contacts.  Program A1:   Electrode polarities: 14+, 15-  Amplitudes programmed: 1.8 V (0.1-10.5)  Pulse width: 720 mcs.  Pulse Rate: 60 Hz.    Program B1:   Electrode polarities: 5+, 6+, 7-,  13+,  14+,  15-   Amplitudes programmed: 0 V (0.1-10.5)  Pulse width: 600 mcs.  Pulse Rate: 60 Hz.  A copy of the telemetry report will be scanned in the patient's chart.    ASSESSMENT:   1. Spinal stenosis, lumbar region, with neurogenic claudication    2. Spondylosis of lumbar region without myelopathy or radiculopathy    3. Cervical spondylosis without myelopathy    4. History of compression fracture of vertebral column    5. Rheumatoid arthritis involving right shoulder with positive rheumatoid factor    6. Polymyalgia rheumatica    7. Osteoporosis    8. Leg length discrepancy    9. Moderate obesity    10. Pulmonary emphysema, unspecified emphysema type    11. Physical deconditioning      PLAN: Patient is doing quite well with the use of her SCS device. I have reviewed all available patient's medical records as well as previous therapies as referenced above under history of present  illness. Continue current management and any additional workup, referrals, and treatments as outlined in the following plan:  1. Patient will follow-up with me on an as needed basis  2. Pharmacological measures: Continue gabapentin and methotrexate, as currently prescribed.  3. Long term rehabilitation efforts:  A. I have recommended water therapy and daily walks for fitness  B. Patient declined physical therapy   C. Patient declined follow-up with Dr. Swartz for CBT/anxiety  4. The patient has been instructed to contact my office with any questions or difficulties. The patient understands the plan and agrees to proceed accordingly.      Patient Care Team:  Yaritza Servin MD as PCP - General  Faraz Aguilera MD as Surgeon (Neurosurgery)  Hugh Martinez MD as Consulting Physician (Anesthesiology)     No orders of the defined types were placed in this encounter.        No future appointments.      Hugh Martinez MD       EMR Dragon/Transcription disclaimer:  Much of this encounter note is an electronic transcription of spoken language to printed text. Electronic transcription of spoken language may permit erroneous, or at times, nonsensical words or phrases to be inadvertently transcribed. Although I have reviewed the note for such errors, some may still exist.

## 2017-05-04 RX ORDER — GABAPENTIN 400 MG/1
CAPSULE ORAL
Qty: 360 CAPSULE | Refills: 1 | Status: SHIPPED | OUTPATIENT
Start: 2017-05-04 | End: 2017-11-28 | Stop reason: SDUPTHER

## 2017-05-24 RX ORDER — NEBIVOLOL HYDROCHLORIDE 10 MG/1
TABLET ORAL
Qty: 90 TABLET | Refills: 2 | Status: SHIPPED | OUTPATIENT
Start: 2017-05-24 | End: 2018-02-18 | Stop reason: SDUPTHER

## 2017-06-15 RX ORDER — MONTELUKAST SODIUM 10 MG/1
TABLET ORAL
Qty: 90 TABLET | Refills: 2 | Status: SHIPPED | OUTPATIENT
Start: 2017-06-15 | End: 2017-07-17 | Stop reason: SDUPTHER

## 2017-07-17 ENCOUNTER — OFFICE VISIT (OUTPATIENT)
Dept: FAMILY MEDICINE CLINIC | Age: 72
End: 2017-07-17
Payer: MEDICARE

## 2017-07-17 ENCOUNTER — HOSPITAL ENCOUNTER (OUTPATIENT)
Dept: OTHER | Age: 72
Discharge: HOME | End: 2017-07-17
Attending: FAMILY MEDICINE | Admitting: FAMILY MEDICINE

## 2017-07-17 VITALS
DIASTOLIC BLOOD PRESSURE: 72 MMHG | BODY MASS INDEX: 34.27 KG/M2 | WEIGHT: 181.5 LBS | HEIGHT: 61 IN | SYSTOLIC BLOOD PRESSURE: 134 MMHG | HEART RATE: 63 BPM | OXYGEN SATURATION: 98 % | RESPIRATION RATE: 16 BRPM

## 2017-07-17 DIAGNOSIS — F32.A DEPRESSION, UNSPECIFIED DEPRESSION TYPE: ICD-10-CM

## 2017-07-17 DIAGNOSIS — M25.562 CHRONIC PAIN OF LEFT KNEE: Primary | ICD-10-CM

## 2017-07-17 DIAGNOSIS — R53.83 FATIGUE, UNSPECIFIED TYPE: ICD-10-CM

## 2017-07-17 DIAGNOSIS — E55.9 VITAMIN D DEFICIENCY: ICD-10-CM

## 2017-07-17 DIAGNOSIS — Z13.220 SCREENING, LIPID: ICD-10-CM

## 2017-07-17 DIAGNOSIS — G89.29 CHRONIC MIDLINE LOW BACK PAIN, WITH SCIATICA PRESENCE UNSPECIFIED: ICD-10-CM

## 2017-07-17 DIAGNOSIS — G89.29 CHRONIC PAIN OF LEFT KNEE: Primary | ICD-10-CM

## 2017-07-17 DIAGNOSIS — M81.0 OSTEOPOROSIS: ICD-10-CM

## 2017-07-17 DIAGNOSIS — M54.5 CHRONIC MIDLINE LOW BACK PAIN, WITH SCIATICA PRESENCE UNSPECIFIED: ICD-10-CM

## 2017-07-17 DIAGNOSIS — I10 ESSENTIAL HYPERTENSION: ICD-10-CM

## 2017-07-17 DIAGNOSIS — Z12.11 SCREENING FOR COLON CANCER: ICD-10-CM

## 2017-07-17 LAB
A/G RATIO: 1.7 (ref 0.8–2)
ALBUMIN SERPL-MCNC: 4.3 G/DL (ref 3.4–4.8)
ALP BLD-CCNC: 62 U/L (ref 25–100)
ALT SERPL-CCNC: 12 U/L (ref 4–36)
ANION GAP SERPL CALCULATED.3IONS-SCNC: 12 MMOL/L (ref 3–16)
AST SERPL-CCNC: 15 U/L (ref 8–33)
BASOPHILS ABSOLUTE: 0 K/UL (ref 0–0.1)
BASOPHILS RELATIVE PERCENT: 0.3 %
BILIRUB SERPL-MCNC: 0.4 MG/DL (ref 0.3–1.2)
BUN BLDV-MCNC: 20 MG/DL (ref 6–20)
CALCIUM SERPL-MCNC: 9.8 MG/DL (ref 8.5–10.5)
CHLORIDE BLD-SCNC: 102 MMOL/L (ref 98–107)
CHOLESTEROL, TOTAL: 171 MG/DL (ref 0–200)
CO2: 28 MMOL/L (ref 20–30)
CREAT SERPL-MCNC: 0.9 MG/DL (ref 0.4–1.2)
EOSINOPHILS ABSOLUTE: 0.2 K/UL (ref 0–0.4)
EOSINOPHILS RELATIVE PERCENT: 2.3 %
FOLATE: >20 NG/ML
GFR AFRICAN AMERICAN: >59
GFR NON-AFRICAN AMERICAN: >60
GLOBULIN: 2.6 G/DL
GLUCOSE BLD-MCNC: 84 MG/DL (ref 74–106)
HCT VFR BLD CALC: 39.8 % (ref 37–47)
HDLC SERPL-MCNC: 62 MG/DL (ref 40–60)
HEMOGLOBIN: 13.3 G/DL (ref 11.5–16.5)
LDL CHOLESTEROL CALCULATED: 84 MG/DL
LYMPHOCYTES ABSOLUTE: 1.7 K/UL (ref 1.5–4)
LYMPHOCYTES RELATIVE PERCENT: 25.1 % (ref 20–40)
MCH RBC QN AUTO: 32.7 PG (ref 27–32)
MCHC RBC AUTO-ENTMCNC: 33.4 G/DL (ref 31–35)
MCV RBC AUTO: 97.8 FL (ref 80–100)
MONOCYTES ABSOLUTE: 0.7 K/UL (ref 0.2–0.8)
MONOCYTES RELATIVE PERCENT: 9.8 % (ref 3–10)
NEUTROPHILS ABSOLUTE: 4.3 K/UL (ref 2–7.5)
NEUTROPHILS RELATIVE PERCENT: 62.5 %
PDW BLD-RTO: 13.5 % (ref 11–16)
PLATELET # BLD: 188 K/UL (ref 150–400)
PMV BLD AUTO: 12.4 FL (ref 6–10)
POTASSIUM SERPL-SCNC: 4.1 MMOL/L (ref 3.4–5.1)
RBC # BLD: 4.07 M/UL (ref 3.8–5.8)
SODIUM BLD-SCNC: 142 MMOL/L (ref 136–145)
TOTAL PROTEIN: 6.9 G/DL (ref 6.4–8.3)
TRIGL SERPL-MCNC: 126 MG/DL (ref 0–249)
TSH SERPL DL<=0.05 MIU/L-ACNC: 1.99 UIU/ML (ref 0.35–5.5)
VITAMIN B-12: 521 PG/ML (ref 211–911)
VITAMIN D 25-HYDROXY: 34.1 (ref 32–100)
VLDLC SERPL CALC-MCNC: 25 MG/DL
WBC # BLD: 6.9 K/UL (ref 4–11)

## 2017-07-17 PROCEDURE — 3017F COLORECTAL CA SCREEN DOC REV: CPT | Performed by: FAMILY MEDICINE

## 2017-07-17 PROCEDURE — G8399 PT W/DXA RESULTS DOCUMENT: HCPCS | Performed by: FAMILY MEDICINE

## 2017-07-17 PROCEDURE — 4040F PNEUMOC VAC/ADMIN/RCVD: CPT | Performed by: FAMILY MEDICINE

## 2017-07-17 PROCEDURE — G8417 CALC BMI ABV UP PARAM F/U: HCPCS | Performed by: FAMILY MEDICINE

## 2017-07-17 PROCEDURE — 4005F PHARM THX FOR OP RXD: CPT | Performed by: FAMILY MEDICINE

## 2017-07-17 PROCEDURE — 1123F ACP DISCUSS/DSCN MKR DOCD: CPT | Performed by: FAMILY MEDICINE

## 2017-07-17 PROCEDURE — 1090F PRES/ABSN URINE INCON ASSESS: CPT | Performed by: FAMILY MEDICINE

## 2017-07-17 PROCEDURE — G8427 DOCREV CUR MEDS BY ELIG CLIN: HCPCS | Performed by: FAMILY MEDICINE

## 2017-07-17 PROCEDURE — 3014F SCREEN MAMMO DOC REV: CPT | Performed by: FAMILY MEDICINE

## 2017-07-17 PROCEDURE — 99214 OFFICE O/P EST MOD 30 MIN: CPT | Performed by: FAMILY MEDICINE

## 2017-07-17 PROCEDURE — 1036F TOBACCO NON-USER: CPT | Performed by: FAMILY MEDICINE

## 2017-07-17 RX ORDER — ZOLEDRONIC ACID 5 MG/100ML
5 INJECTION, SOLUTION INTRAVENOUS ONCE
Qty: 100 ML | Refills: 0 | Status: SHIPPED | OUTPATIENT
Start: 2017-07-17 | End: 2019-01-07 | Stop reason: ALTCHOICE

## 2017-07-17 RX ORDER — NEBIVOLOL 10 MG/1
TABLET ORAL
Qty: 90 TABLET | Refills: 2 | Status: CANCELLED | OUTPATIENT
Start: 2017-07-17

## 2017-07-17 RX ORDER — ALBUTEROL SULFATE 2.5 MG/3ML
SOLUTION RESPIRATORY (INHALATION)
Qty: 360 EACH | Refills: 3 | Status: SHIPPED | OUTPATIENT
Start: 2017-07-17 | End: 2019-06-20 | Stop reason: ALTCHOICE

## 2017-07-17 RX ORDER — FUROSEMIDE 20 MG/1
TABLET ORAL
Qty: 90 TABLET | Refills: 3 | Status: SHIPPED | OUTPATIENT
Start: 2017-07-17 | End: 2018-02-19 | Stop reason: SDUPTHER

## 2017-07-17 RX ORDER — FLUTICASONE FUROATE AND VILANTEROL 100; 25 UG/1; UG/1
1 POWDER RESPIRATORY (INHALATION) DAILY
Qty: 3 EACH | Refills: 5 | Status: CANCELLED | OUTPATIENT
Start: 2017-07-17

## 2017-07-17 RX ORDER — ROPINIROLE 1 MG/1
1 TABLET, FILM COATED ORAL 3 TIMES DAILY
Qty: 270 TABLET | Refills: 3 | Status: SHIPPED | OUTPATIENT
Start: 2017-07-17 | End: 2018-01-24 | Stop reason: SDUPTHER

## 2017-08-09 DIAGNOSIS — M81.0 OSTEOPOROSIS: ICD-10-CM

## 2017-10-10 RX ORDER — FLUTICASONE FUROATE AND VILANTEROL 200; 25 UG/1; UG/1
1 POWDER RESPIRATORY (INHALATION) DAILY
Qty: 1 EACH | Refills: 5 | Status: SHIPPED | OUTPATIENT
Start: 2017-10-10 | End: 2018-02-19 | Stop reason: ALTCHOICE

## 2017-10-25 ENCOUNTER — OFFICE VISIT (OUTPATIENT)
Dept: FAMILY MEDICINE CLINIC | Age: 72
End: 2017-10-25
Payer: MEDICARE

## 2017-10-25 VITALS
BODY MASS INDEX: 33.04 KG/M2 | OXYGEN SATURATION: 98 % | HEART RATE: 65 BPM | DIASTOLIC BLOOD PRESSURE: 72 MMHG | WEIGHT: 175 LBS | RESPIRATION RATE: 18 BRPM | SYSTOLIC BLOOD PRESSURE: 138 MMHG | HEIGHT: 61 IN

## 2017-10-25 DIAGNOSIS — G89.29 CHRONIC MIDLINE LOW BACK PAIN, WITH SCIATICA PRESENCE UNSPECIFIED: ICD-10-CM

## 2017-10-25 DIAGNOSIS — I10 ESSENTIAL HYPERTENSION: ICD-10-CM

## 2017-10-25 DIAGNOSIS — G89.29 CHRONIC PAIN OF LEFT KNEE: Primary | ICD-10-CM

## 2017-10-25 DIAGNOSIS — M25.562 CHRONIC PAIN OF LEFT KNEE: Primary | ICD-10-CM

## 2017-10-25 DIAGNOSIS — M54.5 CHRONIC MIDLINE LOW BACK PAIN, WITH SCIATICA PRESENCE UNSPECIFIED: ICD-10-CM

## 2017-10-25 DIAGNOSIS — R53.83 FATIGUE, UNSPECIFIED TYPE: ICD-10-CM

## 2017-10-25 DIAGNOSIS — F32.A DEPRESSION, UNSPECIFIED DEPRESSION TYPE: ICD-10-CM

## 2017-10-25 PROCEDURE — G8484 FLU IMMUNIZE NO ADMIN: HCPCS | Performed by: FAMILY MEDICINE

## 2017-10-25 PROCEDURE — G8417 CALC BMI ABV UP PARAM F/U: HCPCS | Performed by: FAMILY MEDICINE

## 2017-10-25 PROCEDURE — 1036F TOBACCO NON-USER: CPT | Performed by: FAMILY MEDICINE

## 2017-10-25 PROCEDURE — 4040F PNEUMOC VAC/ADMIN/RCVD: CPT | Performed by: FAMILY MEDICINE

## 2017-10-25 PROCEDURE — 1123F ACP DISCUSS/DSCN MKR DOCD: CPT | Performed by: FAMILY MEDICINE

## 2017-10-25 PROCEDURE — 3017F COLORECTAL CA SCREEN DOC REV: CPT | Performed by: FAMILY MEDICINE

## 2017-10-25 PROCEDURE — 90688 IIV4 VACCINE SPLT 0.5 ML IM: CPT | Performed by: FAMILY MEDICINE

## 2017-10-25 PROCEDURE — G8399 PT W/DXA RESULTS DOCUMENT: HCPCS | Performed by: FAMILY MEDICINE

## 2017-10-25 PROCEDURE — G8427 DOCREV CUR MEDS BY ELIG CLIN: HCPCS | Performed by: FAMILY MEDICINE

## 2017-10-25 PROCEDURE — 1090F PRES/ABSN URINE INCON ASSESS: CPT | Performed by: FAMILY MEDICINE

## 2017-10-25 PROCEDURE — G0008 ADMIN INFLUENZA VIRUS VAC: HCPCS | Performed by: FAMILY MEDICINE

## 2017-10-25 PROCEDURE — 3014F SCREEN MAMMO DOC REV: CPT | Performed by: FAMILY MEDICINE

## 2017-10-25 PROCEDURE — 99214 OFFICE O/P EST MOD 30 MIN: CPT | Performed by: FAMILY MEDICINE

## 2017-10-25 ASSESSMENT — PATIENT HEALTH QUESTIONNAIRE - PHQ9
2. FEELING DOWN, DEPRESSED OR HOPELESS: 0
SUM OF ALL RESPONSES TO PHQ9 QUESTIONS 1 & 2: 0
SUM OF ALL RESPONSES TO PHQ QUESTIONS 1-9: 0
1. LITTLE INTEREST OR PLEASURE IN DOING THINGS: 0

## 2017-10-25 NOTE — PROGRESS NOTES
SUBJECTIVE:    Patient ID: Flakito Armijo is a 81 y.o. female. Chief Complaint   Patient presents with    Knee Pain     chronic, left knee pain    Hypertension     follow up    COPD     follow up       HPI: She's here today about her left knee pain. She's continuing to struggle significantly with her discomfort. She's not having any improvement in her functional ability. She's got have to have something done to the knee. She says she's got scheduled follow-up with orthopedist for surgery. She says her blood pressures have been good at home. She's having good results at home with her medicine. She says her COPD is stable. She's not any increase in chest pain. She's not have any significant worsening of her symptoms. She says her back pain is continuing to struggle for her. She says she's really not been able to do much at all at home anymore. Review of Systems   Musculoskeletal: Positive for gait problem. Psychiatric/Behavioral: Positive for agitation. The patient is nervous/anxious. All other systems reviewed and are negative. OBJECTIVE:  Wt Readings from Last 3 Encounters:   10/25/17 175 lb (79.4 kg)   07/17/17 181 lb 8 oz (82.3 kg)   04/17/17 186 lb (84.4 kg)     BP Readings from Last 3 Encounters:   10/25/17 138/72   07/17/17 134/72   04/17/17 124/84      Pulse Readings from Last 3 Encounters:   10/25/17 65   07/17/17 63   04/17/17 62     Body mass index is 33.07 kg/m². Resp Readings from Last 3 Encounters:   10/25/17 18   07/17/17 16   04/17/17 18     Physical Exam   Constitutional: She is oriented to person, place, and time. She appears well-developed and well-nourished. HENT:   Head: Normocephalic. Neck: Normal range of motion. Neck supple. Cardiovascular: Normal rate and regular rhythm. Pulmonary/Chest: Effort normal and breath sounds normal.   Musculoskeletal:        Right knee: She exhibits decreased range of motion. Tenderness found.  Medial joint line and lateral joint

## 2017-11-28 ENCOUNTER — OFFICE VISIT (OUTPATIENT)
Dept: FAMILY MEDICINE CLINIC | Age: 72
End: 2017-11-28
Payer: MEDICARE

## 2017-11-28 VITALS
OXYGEN SATURATION: 98 % | WEIGHT: 174 LBS | DIASTOLIC BLOOD PRESSURE: 70 MMHG | SYSTOLIC BLOOD PRESSURE: 122 MMHG | BODY MASS INDEX: 32.85 KG/M2 | RESPIRATION RATE: 18 BRPM | HEIGHT: 61 IN | HEART RATE: 68 BPM

## 2017-11-28 DIAGNOSIS — G89.29 CHRONIC PAIN OF LEFT KNEE: ICD-10-CM

## 2017-11-28 DIAGNOSIS — K63.89 COLONIC MASS: Primary | ICD-10-CM

## 2017-11-28 DIAGNOSIS — F32.A DEPRESSION, UNSPECIFIED DEPRESSION TYPE: ICD-10-CM

## 2017-11-28 DIAGNOSIS — M54.5 CHRONIC MIDLINE LOW BACK PAIN, WITH SCIATICA PRESENCE UNSPECIFIED: ICD-10-CM

## 2017-11-28 DIAGNOSIS — R53.83 FATIGUE, UNSPECIFIED TYPE: ICD-10-CM

## 2017-11-28 DIAGNOSIS — M25.562 CHRONIC PAIN OF LEFT KNEE: ICD-10-CM

## 2017-11-28 DIAGNOSIS — G89.29 CHRONIC MIDLINE LOW BACK PAIN, WITH SCIATICA PRESENCE UNSPECIFIED: ICD-10-CM

## 2017-11-28 DIAGNOSIS — I10 ESSENTIAL HYPERTENSION: ICD-10-CM

## 2017-11-28 PROCEDURE — 1123F ACP DISCUSS/DSCN MKR DOCD: CPT | Performed by: FAMILY MEDICINE

## 2017-11-28 PROCEDURE — 3014F SCREEN MAMMO DOC REV: CPT | Performed by: FAMILY MEDICINE

## 2017-11-28 PROCEDURE — 99214 OFFICE O/P EST MOD 30 MIN: CPT | Performed by: FAMILY MEDICINE

## 2017-11-28 PROCEDURE — G8484 FLU IMMUNIZE NO ADMIN: HCPCS | Performed by: FAMILY MEDICINE

## 2017-11-28 PROCEDURE — 1090F PRES/ABSN URINE INCON ASSESS: CPT | Performed by: FAMILY MEDICINE

## 2017-11-28 PROCEDURE — 4040F PNEUMOC VAC/ADMIN/RCVD: CPT | Performed by: FAMILY MEDICINE

## 2017-11-28 PROCEDURE — G8399 PT W/DXA RESULTS DOCUMENT: HCPCS | Performed by: FAMILY MEDICINE

## 2017-11-28 PROCEDURE — G8427 DOCREV CUR MEDS BY ELIG CLIN: HCPCS | Performed by: FAMILY MEDICINE

## 2017-11-28 PROCEDURE — G8417 CALC BMI ABV UP PARAM F/U: HCPCS | Performed by: FAMILY MEDICINE

## 2017-11-28 PROCEDURE — 1036F TOBACCO NON-USER: CPT | Performed by: FAMILY MEDICINE

## 2017-11-28 PROCEDURE — 3017F COLORECTAL CA SCREEN DOC REV: CPT | Performed by: FAMILY MEDICINE

## 2017-11-28 ASSESSMENT — PATIENT HEALTH QUESTIONNAIRE - PHQ9
SUM OF ALL RESPONSES TO PHQ9 QUESTIONS 1 & 2: 2
2. FEELING DOWN, DEPRESSED OR HOPELESS: 1
SUM OF ALL RESPONSES TO PHQ QUESTIONS 1-9: 2
1. LITTLE INTEREST OR PLEASURE IN DOING THINGS: 1

## 2017-11-28 NOTE — PROGRESS NOTES
Have you seen any other physician or provider since your last visit yes - Hospital Admission - [facility]    Have you had any other diagnostic tests since your last visit? yes - Labs/Radiology during admission    Have you changed or stopped any medications since your last visit?  yes - Stopped Norco, Magnesium, Methotrexate and Folic Acid
Neurological: She is alert and oriented to person, place, and time. Skin: Skin is warm and dry. Psychiatric: Her mood appears anxious. She is agitated. She exhibits a depressed mood. Nursing note and vitals reviewed. No results found for requested labs within last 30 days. LDL Calculated (mg/dL)   Date Value   07/17/2017 84         Lab Results   Component Value Date    WBC 6.9 07/17/2017    NEUTROABS 4.3 07/17/2017    HGB 13.3 07/17/2017    HCT 39.8 07/17/2017    MCV 97.8 07/17/2017     07/17/2017       Lab Results   Component Value Date    TSH 1.99 07/17/2017         ASSESSMENT:   1. Colonic mass     2. Essential hypertension     3. Chronic midline low back pain, with sciatica presence unspecified     4. Chronic pain of left knee     5. Fatigue, unspecified type     6. Depression, unspecified depression type          PLAN:  Orders Placed This Encounter   Medications    gabapentin (NEURONTIN) 400 MG capsule     Sig: TAKE 1 CAPSULE FOUR TIMES A DAY     Dispense:  360 capsule     Refill:  0    desoximetasone (TOPICORT) 0.05 % cream     Sig: Apply topically 2 times daily.      Dispense:  100 g     Refill:  1    ARIPiprazole (ABILIFY) 10 MG tablet     Sig: Take 1 tablet by mouth daily Replaces vraylar     Dispense:  90 tablet     Refill:  1        Medications Discontinued During This Encounter   Medication Reason    calcium carbonate 600 MG TABS tablet Therapy completed    cyclobenzaprine (FLEXERIL) 10 MG tablet Patient Choice    folic acid (FOLVITE) 1 MG tablet Therapy completed    HYDROcodone-acetaminophen (NORCO) 7.5-325 MG per tablet Therapy completed    methotrexate (RHEUMATREX) 2.5 MG chemo tablet Therapy completed    tiotropium (SPIRIVA) 18 MCG inhalation capsule Alternate therapy    Cariprazine HCl (VRAYLAR) 3 MG CAPS     gabapentin (NEURONTIN) 400 MG capsule Reorder       Controlled Substances Monitoring: Attestation: The Prescription Monitoring Report for this patient was

## 2017-12-03 RX ORDER — ARIPIPRAZOLE 10 MG/1
10 TABLET ORAL DAILY
Qty: 90 TABLET | Refills: 1 | Status: SHIPPED | OUTPATIENT
Start: 2017-12-03 | End: 2018-08-23

## 2017-12-03 RX ORDER — GABAPENTIN 400 MG/1
CAPSULE ORAL
Qty: 360 CAPSULE | Refills: 0 | Status: SHIPPED | OUTPATIENT
Start: 2017-12-03 | End: 2017-12-07 | Stop reason: SDUPTHER

## 2017-12-03 RX ORDER — DESOXIMETASONE 0.5 MG/G
CREAM TOPICAL
Qty: 100 G | Refills: 1 | Status: SHIPPED | OUTPATIENT
Start: 2017-12-03 | End: 2017-12-07 | Stop reason: SDUPTHER

## 2017-12-07 ENCOUNTER — TELEPHONE (OUTPATIENT)
Dept: FAMILY MEDICINE CLINIC | Age: 72
End: 2017-12-07

## 2017-12-07 RX ORDER — GABAPENTIN 400 MG/1
CAPSULE ORAL
Qty: 360 CAPSULE | Refills: 0 | Status: SHIPPED | OUTPATIENT
Start: 2017-12-07 | End: 2018-02-19 | Stop reason: SDUPTHER

## 2017-12-07 RX ORDER — DESOXIMETASONE 0.5 MG/G
CREAM TOPICAL
Qty: 100 G | Refills: 1 | Status: SHIPPED | OUTPATIENT
Start: 2017-12-07 | End: 2019-08-02

## 2017-12-07 NOTE — TELEPHONE ENCOUNTER
Refill request received from pharmacy.  Medication pending for approval.     Patient information below:      LDL Calculated (mg/dL)   Date Value   07/17/2017 84     AST (U/L)   Date Value   07/17/2017 15     ALT (U/L)   Date Value   07/17/2017 12     BUN (mg/dL)   Date Value   07/17/2017 20      (goal A1C is < 7)   (goal LDL is <100) need 30-50% reduction from baseline     BP Readings from Last 3 Encounters:   11/28/17 122/70   10/25/17 138/72   07/17/17 134/72    (goal /80)      All Future Testing planned in [facility]:      Last Office Visit With PCP:  11/28/2017      Next Visit Date:  Future Appointments  Date Time Provider Ann Silverio   2/27/2018 8:45 AM Casie Barrett MD [address] [place] [address]            Patient Active Problem List:     HTN (hypertension)     Low back pain     Obesity     Trigger point with back pain     Compression fx, thoracic spine (HCC)     Osteoporosis     Acute thoracic back pain     Fatigue     Depression     Seasonal allergies     Chronic pain of left knee

## 2018-01-24 RX ORDER — ROPINIROLE 1 MG/1
1 TABLET, FILM COATED ORAL 3 TIMES DAILY
Qty: 270 TABLET | Refills: 3 | Status: SHIPPED | OUTPATIENT
Start: 2018-01-24 | End: 2018-11-15 | Stop reason: SDUPTHER

## 2018-02-19 ENCOUNTER — OFFICE VISIT (OUTPATIENT)
Dept: FAMILY MEDICINE CLINIC | Age: 73
End: 2018-02-19
Payer: MEDICARE

## 2018-02-19 VITALS
BODY MASS INDEX: 32.1 KG/M2 | HEART RATE: 64 BPM | DIASTOLIC BLOOD PRESSURE: 76 MMHG | OXYGEN SATURATION: 97 % | RESPIRATION RATE: 18 BRPM | HEIGHT: 61 IN | WEIGHT: 170 LBS | SYSTOLIC BLOOD PRESSURE: 132 MMHG

## 2018-02-19 DIAGNOSIS — M54.5 CHRONIC MIDLINE LOW BACK PAIN, WITH SCIATICA PRESENCE UNSPECIFIED: ICD-10-CM

## 2018-02-19 DIAGNOSIS — R53.83 FATIGUE, UNSPECIFIED TYPE: ICD-10-CM

## 2018-02-19 DIAGNOSIS — F32.A DEPRESSION, UNSPECIFIED DEPRESSION TYPE: ICD-10-CM

## 2018-02-19 DIAGNOSIS — G89.29 CHRONIC MIDLINE LOW BACK PAIN, WITH SCIATICA PRESENCE UNSPECIFIED: ICD-10-CM

## 2018-02-19 DIAGNOSIS — M81.0 OSTEOPOROSIS WITHOUT CURRENT PATHOLOGICAL FRACTURE, UNSPECIFIED OSTEOPOROSIS TYPE: ICD-10-CM

## 2018-02-19 DIAGNOSIS — I10 ESSENTIAL HYPERTENSION: Primary | ICD-10-CM

## 2018-02-19 PROCEDURE — 1090F PRES/ABSN URINE INCON ASSESS: CPT | Performed by: FAMILY MEDICINE

## 2018-02-19 PROCEDURE — 1123F ACP DISCUSS/DSCN MKR DOCD: CPT | Performed by: FAMILY MEDICINE

## 2018-02-19 PROCEDURE — 3014F SCREEN MAMMO DOC REV: CPT | Performed by: FAMILY MEDICINE

## 2018-02-19 PROCEDURE — G8427 DOCREV CUR MEDS BY ELIG CLIN: HCPCS | Performed by: FAMILY MEDICINE

## 2018-02-19 PROCEDURE — 3017F COLORECTAL CA SCREEN DOC REV: CPT | Performed by: FAMILY MEDICINE

## 2018-02-19 PROCEDURE — 1036F TOBACCO NON-USER: CPT | Performed by: FAMILY MEDICINE

## 2018-02-19 PROCEDURE — G8399 PT W/DXA RESULTS DOCUMENT: HCPCS | Performed by: FAMILY MEDICINE

## 2018-02-19 PROCEDURE — G8417 CALC BMI ABV UP PARAM F/U: HCPCS | Performed by: FAMILY MEDICINE

## 2018-02-19 PROCEDURE — 99214 OFFICE O/P EST MOD 30 MIN: CPT | Performed by: FAMILY MEDICINE

## 2018-02-19 PROCEDURE — 4040F PNEUMOC VAC/ADMIN/RCVD: CPT | Performed by: FAMILY MEDICINE

## 2018-02-19 PROCEDURE — G8484 FLU IMMUNIZE NO ADMIN: HCPCS | Performed by: FAMILY MEDICINE

## 2018-02-19 RX ORDER — DULOXETIN HYDROCHLORIDE 60 MG/1
CAPSULE, DELAYED RELEASE ORAL
Qty: 90 CAPSULE | Refills: 3 | Status: SHIPPED | OUTPATIENT
Start: 2018-02-19 | End: 2018-10-25

## 2018-02-19 RX ORDER — NEBIVOLOL 10 MG/1
TABLET ORAL
Qty: 90 TABLET | Refills: 3 | Status: SHIPPED | OUTPATIENT
Start: 2018-02-19 | End: 2018-05-18

## 2018-02-19 RX ORDER — MONTELUKAST SODIUM 10 MG/1
TABLET ORAL
Qty: 90 TABLET | Refills: 3 | Status: SHIPPED | OUTPATIENT
Start: 2018-02-19 | End: 2019-02-19 | Stop reason: SDUPTHER

## 2018-02-19 RX ORDER — GABAPENTIN 400 MG/1
CAPSULE ORAL
Qty: 360 CAPSULE | Refills: 2 | Status: SHIPPED | OUTPATIENT
Start: 2018-02-19 | End: 2018-11-15 | Stop reason: SDUPTHER

## 2018-02-19 RX ORDER — FUROSEMIDE 20 MG/1
TABLET ORAL
Qty: 90 TABLET | Refills: 3 | Status: SHIPPED | OUTPATIENT
Start: 2018-02-19 | End: 2018-08-23

## 2018-02-19 RX ORDER — NEBIVOLOL HYDROCHLORIDE 10 MG/1
TABLET ORAL
Qty: 90 TABLET | Refills: 2 | Status: SHIPPED | OUTPATIENT
Start: 2018-02-19 | End: 2018-02-19 | Stop reason: SDUPTHER

## 2018-02-19 NOTE — PROGRESS NOTES
Have you seen any other physician or provider since your last visit no    Have you had any other diagnostic tests since your last visit? no    Have you changed or stopped any medications since your last visit? no     I have recommended that this patient have a mammogram but she declines at this time. I have discussed the risks and benefits of this examination with her. The patient verbalizes understanding.
(CYMBALTA) 60 MG extended release capsule Reorder    gabapentin (NEURONTIN) 400 MG capsule Reorder    montelukast (SINGULAIR) 10 MG tablet Reorder       Controlled Substances Monitoring:

## 2018-05-18 ENCOUNTER — OFFICE VISIT (OUTPATIENT)
Dept: FAMILY MEDICINE CLINIC | Age: 73
End: 2018-05-18
Payer: MEDICARE

## 2018-05-18 VITALS
OXYGEN SATURATION: 98 % | DIASTOLIC BLOOD PRESSURE: 70 MMHG | HEART RATE: 78 BPM | HEIGHT: 61 IN | WEIGHT: 182 LBS | BODY MASS INDEX: 34.36 KG/M2 | SYSTOLIC BLOOD PRESSURE: 117 MMHG | RESPIRATION RATE: 20 BRPM

## 2018-05-18 DIAGNOSIS — I10 ESSENTIAL HYPERTENSION: ICD-10-CM

## 2018-05-18 DIAGNOSIS — J44.9 CHRONIC OBSTRUCTIVE PULMONARY DISEASE, UNSPECIFIED COPD TYPE (HCC): Primary | ICD-10-CM

## 2018-05-18 DIAGNOSIS — F32.A DEPRESSION, UNSPECIFIED DEPRESSION TYPE: ICD-10-CM

## 2018-05-18 DIAGNOSIS — J30.2 SEASONAL ALLERGIC RHINITIS, UNSPECIFIED CHRONICITY, UNSPECIFIED TRIGGER: ICD-10-CM

## 2018-05-18 DIAGNOSIS — K12.1 STOMATITIS: ICD-10-CM

## 2018-05-18 DIAGNOSIS — M25.562 CHRONIC PAIN OF LEFT KNEE: ICD-10-CM

## 2018-05-18 DIAGNOSIS — R53.83 FATIGUE, UNSPECIFIED TYPE: ICD-10-CM

## 2018-05-18 DIAGNOSIS — G89.29 CHRONIC PAIN OF LEFT KNEE: ICD-10-CM

## 2018-05-18 DIAGNOSIS — G89.29 CHRONIC MIDLINE LOW BACK PAIN, WITH SCIATICA PRESENCE UNSPECIFIED: ICD-10-CM

## 2018-05-18 DIAGNOSIS — M54.5 CHRONIC MIDLINE LOW BACK PAIN, WITH SCIATICA PRESENCE UNSPECIFIED: ICD-10-CM

## 2018-05-18 DIAGNOSIS — M81.0 OSTEOPOROSIS WITHOUT CURRENT PATHOLOGICAL FRACTURE, UNSPECIFIED OSTEOPOROSIS TYPE: ICD-10-CM

## 2018-05-18 PROCEDURE — G8926 SPIRO NO PERF OR DOC: HCPCS | Performed by: FAMILY MEDICINE

## 2018-05-18 PROCEDURE — 99214 OFFICE O/P EST MOD 30 MIN: CPT | Performed by: FAMILY MEDICINE

## 2018-05-18 PROCEDURE — G8399 PT W/DXA RESULTS DOCUMENT: HCPCS | Performed by: FAMILY MEDICINE

## 2018-05-18 PROCEDURE — 1090F PRES/ABSN URINE INCON ASSESS: CPT | Performed by: FAMILY MEDICINE

## 2018-05-18 PROCEDURE — 3017F COLORECTAL CA SCREEN DOC REV: CPT | Performed by: FAMILY MEDICINE

## 2018-05-18 PROCEDURE — G8417 CALC BMI ABV UP PARAM F/U: HCPCS | Performed by: FAMILY MEDICINE

## 2018-05-18 PROCEDURE — 4040F PNEUMOC VAC/ADMIN/RCVD: CPT | Performed by: FAMILY MEDICINE

## 2018-05-18 PROCEDURE — 1123F ACP DISCUSS/DSCN MKR DOCD: CPT | Performed by: FAMILY MEDICINE

## 2018-05-18 PROCEDURE — 3023F SPIROM DOC REV: CPT | Performed by: FAMILY MEDICINE

## 2018-05-18 PROCEDURE — 1036F TOBACCO NON-USER: CPT | Performed by: FAMILY MEDICINE

## 2018-05-18 PROCEDURE — G8427 DOCREV CUR MEDS BY ELIG CLIN: HCPCS | Performed by: FAMILY MEDICINE

## 2018-05-18 RX ORDER — CARVEDILOL 6.25 MG/1
6.25 TABLET ORAL 2 TIMES DAILY WITH MEALS
Qty: 60 TABLET | Refills: 5 | Status: SHIPPED | OUTPATIENT
Start: 2018-05-18 | End: 2018-08-23 | Stop reason: ALTCHOICE

## 2018-05-21 ENCOUNTER — HOSPITAL ENCOUNTER (OUTPATIENT)
Dept: OTHER | Age: 73
Discharge: HOME | End: 2018-05-21
Attending: FAMILY MEDICINE | Admitting: FAMILY MEDICINE

## 2018-05-21 DIAGNOSIS — R53.83 FATIGUE, UNSPECIFIED TYPE: ICD-10-CM

## 2018-05-21 DIAGNOSIS — I10 ESSENTIAL HYPERTENSION: ICD-10-CM

## 2018-05-21 DIAGNOSIS — K12.1 STOMATITIS: ICD-10-CM

## 2018-05-21 LAB
A/G RATIO: 1.5 (ref 0.8–2)
ALBUMIN SERPL-MCNC: 4 G/DL (ref 3.4–4.8)
ALP BLD-CCNC: 68 U/L (ref 25–100)
ALT SERPL-CCNC: 12 U/L (ref 4–36)
ANION GAP SERPL CALCULATED.3IONS-SCNC: 11 MMOL/L (ref 3–16)
AST SERPL-CCNC: 15 U/L (ref 8–33)
BILIRUB SERPL-MCNC: 0.5 MG/DL (ref 0.3–1.2)
BUN BLDV-MCNC: 20 MG/DL (ref 6–20)
CALCIUM SERPL-MCNC: 9.5 MG/DL (ref 8.5–10.5)
CHLORIDE BLD-SCNC: 101 MMOL/L (ref 98–107)
CHOLESTEROL, TOTAL: 181 MG/DL (ref 0–200)
CO2: 28 MMOL/L (ref 20–30)
CREAT SERPL-MCNC: 0.8 MG/DL (ref 0.4–1.2)
FOLATE: 17.14 NG/ML
GFR AFRICAN AMERICAN: >59
GFR NON-AFRICAN AMERICAN: >60
GLOBULIN: 2.6 G/DL
GLUCOSE BLD-MCNC: 94 MG/DL (ref 74–106)
HCT VFR BLD CALC: 38.9 % (ref 37–47)
HDLC SERPL-MCNC: 70 MG/DL (ref 40–60)
HEMOGLOBIN: 12.7 G/DL (ref 11.5–16.5)
LDL CHOLESTEROL CALCULATED: 92 MG/DL
MCH RBC QN AUTO: 31 PG (ref 27–32)
MCHC RBC AUTO-ENTMCNC: 32.6 G/DL (ref 31–35)
MCV RBC AUTO: 94.9 FL (ref 80–100)
PDW BLD-RTO: 13.2 % (ref 11–16)
PLATELET # BLD: 187 K/UL (ref 150–400)
PMV BLD AUTO: 12.2 FL (ref 6–10)
POTASSIUM SERPL-SCNC: 4.4 MMOL/L (ref 3.4–5.1)
RBC # BLD: 4.1 M/UL (ref 3.8–5.8)
SODIUM BLD-SCNC: 140 MMOL/L (ref 136–145)
TOTAL PROTEIN: 6.6 G/DL (ref 6.4–8.3)
TRIGL SERPL-MCNC: 96 MG/DL (ref 0–249)
TSH SERPL DL<=0.05 MIU/L-ACNC: 4.14 UIU/ML (ref 0.35–5.5)
VITAMIN B-12: 479 PG/ML (ref 211–911)
VLDLC SERPL CALC-MCNC: 19 MG/DL
WBC # BLD: 6.5 K/UL (ref 4–11)

## 2018-08-23 ENCOUNTER — OFFICE VISIT (OUTPATIENT)
Dept: FAMILY MEDICINE CLINIC | Age: 73
End: 2018-08-23
Payer: MEDICARE

## 2018-08-23 VITALS
HEIGHT: 61 IN | OXYGEN SATURATION: 99 % | SYSTOLIC BLOOD PRESSURE: 124 MMHG | RESPIRATION RATE: 18 BRPM | BODY MASS INDEX: 35.48 KG/M2 | DIASTOLIC BLOOD PRESSURE: 88 MMHG | HEART RATE: 82 BPM | WEIGHT: 187.9 LBS

## 2018-08-23 DIAGNOSIS — F32.A DEPRESSION, UNSPECIFIED DEPRESSION TYPE: ICD-10-CM

## 2018-08-23 DIAGNOSIS — I10 ESSENTIAL HYPERTENSION: Primary | ICD-10-CM

## 2018-08-23 DIAGNOSIS — R68.2 DRY MOUTH: ICD-10-CM

## 2018-08-23 DIAGNOSIS — F41.9 ANXIETY: ICD-10-CM

## 2018-08-23 DIAGNOSIS — K12.1 STOMATITIS: ICD-10-CM

## 2018-08-23 PROCEDURE — 1123F ACP DISCUSS/DSCN MKR DOCD: CPT | Performed by: FAMILY MEDICINE

## 2018-08-23 PROCEDURE — 1101F PT FALLS ASSESS-DOCD LE1/YR: CPT | Performed by: FAMILY MEDICINE

## 2018-08-23 PROCEDURE — 1036F TOBACCO NON-USER: CPT | Performed by: FAMILY MEDICINE

## 2018-08-23 PROCEDURE — 96372 THER/PROPH/DIAG INJ SC/IM: CPT | Performed by: FAMILY MEDICINE

## 2018-08-23 PROCEDURE — 1090F PRES/ABSN URINE INCON ASSESS: CPT | Performed by: FAMILY MEDICINE

## 2018-08-23 PROCEDURE — 4040F PNEUMOC VAC/ADMIN/RCVD: CPT | Performed by: FAMILY MEDICINE

## 2018-08-23 PROCEDURE — G8417 CALC BMI ABV UP PARAM F/U: HCPCS | Performed by: FAMILY MEDICINE

## 2018-08-23 PROCEDURE — G8399 PT W/DXA RESULTS DOCUMENT: HCPCS | Performed by: FAMILY MEDICINE

## 2018-08-23 PROCEDURE — 99214 OFFICE O/P EST MOD 30 MIN: CPT | Performed by: FAMILY MEDICINE

## 2018-08-23 PROCEDURE — 3017F COLORECTAL CA SCREEN DOC REV: CPT | Performed by: FAMILY MEDICINE

## 2018-08-23 PROCEDURE — G8427 DOCREV CUR MEDS BY ELIG CLIN: HCPCS | Performed by: FAMILY MEDICINE

## 2018-08-23 RX ORDER — BUSPIRONE HYDROCHLORIDE 7.5 MG/1
7.5 TABLET ORAL 3 TIMES DAILY PRN
Qty: 90 TABLET | Refills: 2 | Status: SHIPPED | OUTPATIENT
Start: 2018-08-23 | End: 2018-09-20

## 2018-08-23 RX ORDER — CYANOCOBALAMIN 1000 UG/ML
1000 INJECTION INTRAMUSCULAR; SUBCUTANEOUS ONCE
Status: COMPLETED | OUTPATIENT
Start: 2018-08-23 | End: 2018-08-23

## 2018-08-23 RX ORDER — CARVEDILOL 12.5 MG/1
12.5 TABLET ORAL 2 TIMES DAILY WITH MEALS
Qty: 60 TABLET | Refills: 5 | Status: SHIPPED | OUTPATIENT
Start: 2018-08-23 | End: 2018-09-20

## 2018-08-23 RX ORDER — CARVEDILOL 6.25 MG/1
6.25 TABLET ORAL 2 TIMES DAILY WITH MEALS
COMMUNITY
End: 2018-08-23

## 2018-08-23 RX ADMIN — CYANOCOBALAMIN 1000 MCG: 1000 INJECTION INTRAMUSCULAR; SUBCUTANEOUS at 09:58

## 2018-08-23 NOTE — PROGRESS NOTES
Administrations This Visit     cyanocobalamin injection 1,000 mcg     Admin Date  08/23/2018  09:58 Action  Given Dose  1000 mcg Route  Intramuscular Site  Deltoid Right Administered By  [address]    Ordering Provider:  Diana Greene MD    [address]:  [id number]    Lot#:  [id number]    :  [address] [facility]    Patient Supplied?:  No
line and lateral joint line tenderness noted. Left knee: She exhibits decreased range of motion. Tenderness found. Medial joint line and lateral joint line tenderness noted. Lumbar back: She exhibits decreased range of motion, tenderness, pain and spasm. Neurological: She is alert and oriented to person, place, and time. Skin: Skin is warm and dry. Psychiatric: Her mood appears anxious. She is agitated. She exhibits a depressed mood. Nursing note and vitals reviewed. No results found for requested labs within last 30 days. LDL Calculated (mg/dL)   Date Value   05/21/2018 92         Lab Results   Component Value Date    WBC 6.5 05/21/2018    NEUTROABS 4.3 07/17/2017    HGB 12.7 05/21/2018    HCT 38.9 05/21/2018    MCV 94.9 05/21/2018     05/21/2018       Lab Results   Component Value Date    TSH 4.14 05/21/2018         ASSESSMENT:    Diagnosis Orders   1. Essential hypertension     2. Dry mouth     3. Anxiety     4. Depression, unspecified depression type     5.  Stomatitis          PLAN:  Orders Placed This Encounter   Medications    cyanocobalamin injection 1,000 mcg    carvedilol (COREG) 12.5 MG tablet     Sig: Take 1 tablet by mouth 2 times daily (with meals)     Dispense:  60 tablet     Refill:  5    busPIRone (BUSPAR) 7.5 MG tablet     Sig: Take 1 tablet by mouth 3 times daily as needed (anxiety)     Dispense:  90 tablet     Refill:  2        Medications Discontinued During This Encounter   Medication Reason    furosemide (LASIX) 20 MG tablet Cost of medication    ARIPiprazole (ABILIFY) 10 MG tablet Cost of medication    carvedilol (COREG) 6.25 MG tablet Alternate therapy    carvedilol (COREG) 6.25 MG tablet        Controlled Substances Monitoring:

## 2018-08-30 ENCOUNTER — NURSE ONLY (OUTPATIENT)
Dept: FAMILY MEDICINE CLINIC | Age: 73
End: 2018-08-30
Payer: MEDICARE

## 2018-08-30 DIAGNOSIS — E53.8 B12 DEFICIENCY: Primary | ICD-10-CM

## 2018-08-30 PROCEDURE — 96372 THER/PROPH/DIAG INJ SC/IM: CPT | Performed by: FAMILY MEDICINE

## 2018-08-30 RX ORDER — CYANOCOBALAMIN 1000 UG/ML
1000 INJECTION INTRAMUSCULAR; SUBCUTANEOUS ONCE
Status: COMPLETED | OUTPATIENT
Start: 2018-08-30 | End: 2018-08-30

## 2018-08-30 RX ADMIN — CYANOCOBALAMIN 1000 MCG: 1000 INJECTION INTRAMUSCULAR; SUBCUTANEOUS at 09:36

## 2018-08-30 NOTE — PROGRESS NOTES
Administrations This Visit     cyanocobalamin injection 1,000 mcg     Admin Date  08/30/2018  09:36 Action  Given Dose  1000 mcg Route  Intramuscular Site  Deltoid Right Administered By  Cherylene Patten    Ordering Provider:  Daphney Gonzalez MD    [address]:  [phone]    Lot#:  [id number]    :  [facility]    Patient Supplied?:  No    Comments:  03/2020

## 2018-09-20 ENCOUNTER — OFFICE VISIT (OUTPATIENT)
Dept: FAMILY MEDICINE CLINIC | Age: 73
End: 2018-09-20
Payer: MEDICARE

## 2018-09-20 VITALS
OXYGEN SATURATION: 98 % | RESPIRATION RATE: 20 BRPM | HEART RATE: 78 BPM | HEIGHT: 61 IN | BODY MASS INDEX: 35.3 KG/M2 | DIASTOLIC BLOOD PRESSURE: 80 MMHG | WEIGHT: 187 LBS | SYSTOLIC BLOOD PRESSURE: 130 MMHG

## 2018-09-20 DIAGNOSIS — Z12.11 SCREENING FOR COLON CANCER: ICD-10-CM

## 2018-09-20 DIAGNOSIS — M85.89 OSTEOPENIA OF MULTIPLE SITES: ICD-10-CM

## 2018-09-20 DIAGNOSIS — M81.0 OSTEOPOROSIS WITHOUT CURRENT PATHOLOGICAL FRACTURE, UNSPECIFIED OSTEOPOROSIS TYPE: ICD-10-CM

## 2018-09-20 DIAGNOSIS — I10 ESSENTIAL HYPERTENSION: Primary | ICD-10-CM

## 2018-09-20 DIAGNOSIS — F32.A DEPRESSION, UNSPECIFIED DEPRESSION TYPE: ICD-10-CM

## 2018-09-20 PROCEDURE — 90688 IIV4 VACCINE SPLT 0.5 ML IM: CPT | Performed by: FAMILY MEDICINE

## 2018-09-20 PROCEDURE — G8417 CALC BMI ABV UP PARAM F/U: HCPCS | Performed by: FAMILY MEDICINE

## 2018-09-20 PROCEDURE — 1123F ACP DISCUSS/DSCN MKR DOCD: CPT | Performed by: FAMILY MEDICINE

## 2018-09-20 PROCEDURE — 1090F PRES/ABSN URINE INCON ASSESS: CPT | Performed by: FAMILY MEDICINE

## 2018-09-20 PROCEDURE — 3017F COLORECTAL CA SCREEN DOC REV: CPT | Performed by: FAMILY MEDICINE

## 2018-09-20 PROCEDURE — G0008 ADMIN INFLUENZA VIRUS VAC: HCPCS | Performed by: FAMILY MEDICINE

## 2018-09-20 PROCEDURE — 1036F TOBACCO NON-USER: CPT | Performed by: FAMILY MEDICINE

## 2018-09-20 PROCEDURE — 99214 OFFICE O/P EST MOD 30 MIN: CPT | Performed by: FAMILY MEDICINE

## 2018-09-20 PROCEDURE — G8399 PT W/DXA RESULTS DOCUMENT: HCPCS | Performed by: FAMILY MEDICINE

## 2018-09-20 PROCEDURE — G8427 DOCREV CUR MEDS BY ELIG CLIN: HCPCS | Performed by: FAMILY MEDICINE

## 2018-09-20 PROCEDURE — 1101F PT FALLS ASSESS-DOCD LE1/YR: CPT | Performed by: FAMILY MEDICINE

## 2018-09-20 PROCEDURE — 4040F PNEUMOC VAC/ADMIN/RCVD: CPT | Performed by: FAMILY MEDICINE

## 2018-09-20 RX ORDER — CARVEDILOL 25 MG/1
25 TABLET ORAL 2 TIMES DAILY
Qty: 60 TABLET | Refills: 1 | Status: SHIPPED | OUTPATIENT
Start: 2018-09-20 | End: 2018-10-09 | Stop reason: SDUPTHER

## 2018-09-20 RX ORDER — BUSPIRONE HYDROCHLORIDE 15 MG/1
15 TABLET ORAL 3 TIMES DAILY
Qty: 90 TABLET | Refills: 1 | Status: SHIPPED | OUTPATIENT
Start: 2018-09-20 | End: 2018-10-09 | Stop reason: SDUPTHER

## 2018-09-20 NOTE — PROGRESS NOTES
Immunizations     Name Date Dose Route    Influenza, Kenan Glaze, 3 Years and older, IM (Fluzone 3 yrs and older or Afluria 5 yrs and older) 9/20/2018 0.5 mL Intramuscular    Site: Deltoid- Right    Lot: Freda Creamer: [id number]
Left knee: She exhibits decreased range of motion. Tenderness found. Medial joint line and lateral joint line tenderness noted. Lumbar back: She exhibits decreased range of motion, tenderness, pain and spasm. Neurological: She is alert and oriented to person, place, and time. Skin: Skin is warm and dry. Psychiatric: Her mood appears anxious. She is agitated. She exhibits a depressed mood. Nursing note and vitals reviewed. No results found for requested labs within last 30 days. LDL Calculated (mg/dL)   Date Value   05/21/2018 92         Lab Results   Component Value Date    WBC 6.5 05/21/2018    NEUTROABS 4.3 07/17/2017    HGB 12.7 05/21/2018    HCT 38.9 05/21/2018    MCV 94.9 05/21/2018     05/21/2018       Lab Results   Component Value Date    TSH 4.14 05/21/2018         ASSESSMENT:    Diagnosis Orders   1. Essential hypertension     2. Screening for colon cancer  POCT Fecal Immunochemical Test (FIT)   3. Osteopenia of multiple sites  DEXA Bone Density Axial Skeleton   4. Osteoporosis without current pathological fracture, unspecified osteoporosis type  DEXA Bone Density Axial Skeleton   5. Depression, unspecified depression type          PLAN:  Orders Placed This Encounter   Medications    busPIRone (BUSPAR) 15 MG tablet     Sig: Take 15 mg by mouth 3 times daily     Dispense:  90 tablet     Refill:  1    carvedilol (COREG) 25 MG tablet     Sig: Take 1 tablet by mouth 2 times daily     Dispense:  60 tablet     Refill:  1        Medications Discontinued During This Encounter   Medication Reason    carvedilol (COREG) 12.5 MG tablet     busPIRone (BUSPAR) 7.5 MG tablet        Controlled Substances Monitoring: Attestation: The Prescription Monitoring Report for this patient was reviewed today. Hetal Powell MD)  Documentation: No signs of potential drug abuse or diversion identified.  Hetal Powell MD)

## 2018-10-01 DIAGNOSIS — Z12.11 SCREENING FOR COLON CANCER: ICD-10-CM

## 2018-10-01 LAB
CONTROL: NORMAL
HEMOCCULT STL QL: NEGATIVE

## 2018-10-01 PROCEDURE — 82274 ASSAY TEST FOR BLOOD FECAL: CPT | Performed by: FAMILY MEDICINE

## 2018-10-09 ENCOUNTER — OFFICE VISIT (OUTPATIENT)
Dept: FAMILY MEDICINE CLINIC | Age: 73
End: 2018-10-09
Payer: MEDICARE

## 2018-10-09 VITALS
BODY MASS INDEX: 36.74 KG/M2 | HEART RATE: 78 BPM | RESPIRATION RATE: 20 BRPM | SYSTOLIC BLOOD PRESSURE: 124 MMHG | WEIGHT: 194.6 LBS | DIASTOLIC BLOOD PRESSURE: 80 MMHG | OXYGEN SATURATION: 97 % | HEIGHT: 61 IN

## 2018-10-09 DIAGNOSIS — M54.5 CHRONIC MIDLINE LOW BACK PAIN, WITH SCIATICA PRESENCE UNSPECIFIED: ICD-10-CM

## 2018-10-09 DIAGNOSIS — G89.29 CHRONIC MIDLINE LOW BACK PAIN, WITH SCIATICA PRESENCE UNSPECIFIED: ICD-10-CM

## 2018-10-09 DIAGNOSIS — I10 ESSENTIAL HYPERTENSION: Primary | ICD-10-CM

## 2018-10-09 DIAGNOSIS — F32.A DEPRESSION, UNSPECIFIED DEPRESSION TYPE: ICD-10-CM

## 2018-10-09 DIAGNOSIS — R53.83 FATIGUE, UNSPECIFIED TYPE: ICD-10-CM

## 2018-10-09 DIAGNOSIS — F41.9 ANXIETY: ICD-10-CM

## 2018-10-09 PROCEDURE — G8399 PT W/DXA RESULTS DOCUMENT: HCPCS | Performed by: FAMILY MEDICINE

## 2018-10-09 PROCEDURE — 1090F PRES/ABSN URINE INCON ASSESS: CPT | Performed by: FAMILY MEDICINE

## 2018-10-09 PROCEDURE — 99213 OFFICE O/P EST LOW 20 MIN: CPT | Performed by: FAMILY MEDICINE

## 2018-10-09 PROCEDURE — 1101F PT FALLS ASSESS-DOCD LE1/YR: CPT | Performed by: FAMILY MEDICINE

## 2018-10-09 PROCEDURE — G8482 FLU IMMUNIZE ORDER/ADMIN: HCPCS | Performed by: FAMILY MEDICINE

## 2018-10-09 PROCEDURE — G8427 DOCREV CUR MEDS BY ELIG CLIN: HCPCS | Performed by: FAMILY MEDICINE

## 2018-10-09 PROCEDURE — 1036F TOBACCO NON-USER: CPT | Performed by: FAMILY MEDICINE

## 2018-10-09 PROCEDURE — G8417 CALC BMI ABV UP PARAM F/U: HCPCS | Performed by: FAMILY MEDICINE

## 2018-10-09 PROCEDURE — 4040F PNEUMOC VAC/ADMIN/RCVD: CPT | Performed by: FAMILY MEDICINE

## 2018-10-09 PROCEDURE — 3017F COLORECTAL CA SCREEN DOC REV: CPT | Performed by: FAMILY MEDICINE

## 2018-10-09 PROCEDURE — 1123F ACP DISCUSS/DSCN MKR DOCD: CPT | Performed by: FAMILY MEDICINE

## 2018-10-09 RX ORDER — BUSPIRONE HYDROCHLORIDE 15 MG/1
15 TABLET ORAL 3 TIMES DAILY
Qty: 90 TABLET | Refills: 2 | Status: SHIPPED | OUTPATIENT
Start: 2018-10-09 | End: 2018-10-25

## 2018-10-09 RX ORDER — CARVEDILOL 25 MG/1
25 TABLET ORAL 2 TIMES DAILY
Qty: 60 TABLET | Refills: 1 | Status: SHIPPED | OUTPATIENT
Start: 2018-10-09 | End: 2018-11-15 | Stop reason: SDUPTHER

## 2018-10-09 RX ORDER — LISINOPRIL 10 MG/1
10 TABLET ORAL DAILY
Qty: 30 TABLET | Refills: 2 | Status: SHIPPED | OUTPATIENT
Start: 2018-10-09 | End: 2018-10-25

## 2018-10-09 NOTE — PROGRESS NOTES
SUBJECTIVE:    Patient ID: Vani Brown is a 72 y.o. female. Chief Complaint   Patient presents with    Hypertension     pt states its been running high        HPI:She's here today to follow-up on her hypertension. Her blood pressures have been running-year-old still little high. Not as bad as they were. She still getting some 90s on the bottom. She's very frustrated with her weight gain. She's very frustrated with her functional ability. She's having so much pain she is really unable to get out and walk or do much. She is feeling like her depression is relatively controlled with her current medicines. She denies any chest pain or shortness of breath at this time. She's not have any medication problems. She denies any other new symptoms. Review of Systems   Musculoskeletal: Positive for gait problem. Psychiatric/Behavioral: Positive for agitation. The patient is nervous/anxious. All other systems reviewed and are negative. OBJECTIVE:  Wt Readings from Last 3 Encounters:   10/09/18 194 lb 9.6 oz (88.3 kg)   09/20/18 187 lb (84.8 kg)   08/23/18 187 lb 14.4 oz (85.2 kg)     BP Readings from Last 3 Encounters:   10/09/18 124/80   09/20/18 130/80   08/23/18 124/88      Pulse Readings from Last 3 Encounters:   10/09/18 78   09/20/18 78   08/23/18 82     Body mass index is 36.77 kg/m². Resp Readings from Last 3 Encounters:   10/09/18 20   09/20/18 20   08/23/18 18     Physical Exam   Constitutional: She is oriented to person, place, and time. She appears well-developed and well-nourished. HENT:   Head: Normocephalic. Neck: Normal range of motion. Neck supple. Cardiovascular: Normal rate and regular rhythm. Pulmonary/Chest: Effort normal and breath sounds normal.   Musculoskeletal:        Right knee: She exhibits decreased range of motion. Tenderness found. Medial joint line and lateral joint line tenderness noted. Left knee: She exhibits decreased range of motion. Tenderness found.

## 2018-10-09 NOTE — PROGRESS NOTES
I have recommended that this patient have a mammogram but she declines at this time. I have discussed the risks and benefits of this examination with her. The patient verbalizes understanding.

## 2018-10-25 ENCOUNTER — OFFICE VISIT (OUTPATIENT)
Dept: FAMILY MEDICINE CLINIC | Age: 73
End: 2018-10-25
Payer: MEDICARE

## 2018-10-25 VITALS
BODY MASS INDEX: 37.23 KG/M2 | DIASTOLIC BLOOD PRESSURE: 86 MMHG | RESPIRATION RATE: 20 BRPM | HEIGHT: 61 IN | WEIGHT: 197.2 LBS | SYSTOLIC BLOOD PRESSURE: 132 MMHG | HEART RATE: 78 BPM

## 2018-10-25 DIAGNOSIS — Z00.00 ROUTINE GENERAL MEDICAL EXAMINATION AT A HEALTH CARE FACILITY: Primary | ICD-10-CM

## 2018-10-25 PROBLEM — J44.9 CHRONIC OBSTRUCTIVE PULMONARY DISEASE (HCC): Status: ACTIVE | Noted: 2017-01-24

## 2018-10-25 PROBLEM — M35.3 POLYMYALGIA RHEUMATICA (HCC): Status: ACTIVE | Noted: 2017-01-24

## 2018-10-25 PROBLEM — M05.9 RHEUMATOID ARTHRITIS WITH POSITIVE RHEUMATOID FACTOR (HCC): Status: ACTIVE | Noted: 2017-01-24

## 2018-10-25 PROCEDURE — G0438 PPPS, INITIAL VISIT: HCPCS | Performed by: FAMILY MEDICINE

## 2018-10-25 PROCEDURE — 4040F PNEUMOC VAC/ADMIN/RCVD: CPT | Performed by: FAMILY MEDICINE

## 2018-10-25 RX ORDER — VENLAFAXINE 75 MG/1
75 TABLET ORAL 2 TIMES DAILY
Qty: 60 TABLET | Refills: 2 | Status: SHIPPED | OUTPATIENT
Start: 2018-10-25 | End: 2019-01-23 | Stop reason: SDUPTHER

## 2018-10-25 RX ORDER — BUSPIRONE HYDROCHLORIDE 30 MG/1
30 TABLET ORAL 3 TIMES DAILY PRN
Qty: 90 TABLET | Refills: 2 | Status: SHIPPED | OUTPATIENT
Start: 2018-10-25 | End: 2018-11-15

## 2018-10-25 ASSESSMENT — ANXIETY QUESTIONNAIRES
GAD7 TOTAL SCORE: 0
GAD7 TOTAL SCORE: 0

## 2018-10-25 ASSESSMENT — PATIENT HEALTH QUESTIONNAIRE - PHQ9
SUM OF ALL RESPONSES TO PHQ QUESTIONS 1-9: 0
SUM OF ALL RESPONSES TO PHQ QUESTIONS 1-9: 0

## 2018-10-25 ASSESSMENT — LIFESTYLE VARIABLES
HOW OFTEN DO YOU HAVE A DRINK CONTAINING ALCOHOL: 0
HOW OFTEN DO YOU HAVE A DRINK CONTAINING ALCOHOL: 0

## 2018-10-25 NOTE — PROGRESS NOTES
per FRAX score)  Completed    Flu vaccine  Completed     Recommendations for Preventive Services Due: see orders and patient instructions/AVS.  .   Recommended screening schedule for the next 5-10 years is provided to the patient in written form: see Patient Instructions/AVS.

## 2018-10-30 ENCOUNTER — OFFICE VISIT (OUTPATIENT)
Dept: FAMILY MEDICINE CLINIC | Age: 73
End: 2018-10-30
Payer: MEDICARE

## 2018-10-30 ENCOUNTER — TELEPHONE (OUTPATIENT)
Dept: FAMILY MEDICINE CLINIC | Age: 73
End: 2018-10-30

## 2018-10-30 VITALS
HEART RATE: 70 BPM | BODY MASS INDEX: 36.82 KG/M2 | DIASTOLIC BLOOD PRESSURE: 82 MMHG | RESPIRATION RATE: 18 BRPM | HEIGHT: 61 IN | WEIGHT: 195 LBS | SYSTOLIC BLOOD PRESSURE: 130 MMHG | OXYGEN SATURATION: 98 %

## 2018-10-30 DIAGNOSIS — M35.3 POLYMYALGIA RHEUMATICA (HCC): ICD-10-CM

## 2018-10-30 DIAGNOSIS — G89.29 CHRONIC MIDLINE LOW BACK PAIN, WITH SCIATICA PRESENCE UNSPECIFIED: ICD-10-CM

## 2018-10-30 DIAGNOSIS — M54.5 CHRONIC MIDLINE LOW BACK PAIN, WITH SCIATICA PRESENCE UNSPECIFIED: ICD-10-CM

## 2018-10-30 DIAGNOSIS — F41.9 ANXIETY: Primary | ICD-10-CM

## 2018-10-30 DIAGNOSIS — M05.9 RHEUMATOID ARTHRITIS WITH POSITIVE RHEUMATOID FACTOR, INVOLVING UNSPECIFIED SITE (HCC): ICD-10-CM

## 2018-10-30 DIAGNOSIS — I10 ESSENTIAL HYPERTENSION: ICD-10-CM

## 2018-10-30 PROCEDURE — 3017F COLORECTAL CA SCREEN DOC REV: CPT | Performed by: FAMILY MEDICINE

## 2018-10-30 PROCEDURE — G8482 FLU IMMUNIZE ORDER/ADMIN: HCPCS | Performed by: FAMILY MEDICINE

## 2018-10-30 PROCEDURE — 1036F TOBACCO NON-USER: CPT | Performed by: FAMILY MEDICINE

## 2018-10-30 PROCEDURE — G8417 CALC BMI ABV UP PARAM F/U: HCPCS | Performed by: FAMILY MEDICINE

## 2018-10-30 PROCEDURE — G8399 PT W/DXA RESULTS DOCUMENT: HCPCS | Performed by: FAMILY MEDICINE

## 2018-10-30 PROCEDURE — 1123F ACP DISCUSS/DSCN MKR DOCD: CPT | Performed by: FAMILY MEDICINE

## 2018-10-30 PROCEDURE — 99213 OFFICE O/P EST LOW 20 MIN: CPT | Performed by: FAMILY MEDICINE

## 2018-10-30 PROCEDURE — 4040F PNEUMOC VAC/ADMIN/RCVD: CPT | Performed by: FAMILY MEDICINE

## 2018-10-30 PROCEDURE — 1090F PRES/ABSN URINE INCON ASSESS: CPT | Performed by: FAMILY MEDICINE

## 2018-10-30 PROCEDURE — G8427 DOCREV CUR MEDS BY ELIG CLIN: HCPCS | Performed by: FAMILY MEDICINE

## 2018-10-30 PROCEDURE — 1101F PT FALLS ASSESS-DOCD LE1/YR: CPT | Performed by: FAMILY MEDICINE

## 2018-11-13 DIAGNOSIS — M85.89 OSTEOPENIA OF MULTIPLE SITES: ICD-10-CM

## 2018-11-13 DIAGNOSIS — M81.0 OSTEOPOROSIS WITHOUT CURRENT PATHOLOGICAL FRACTURE, UNSPECIFIED OSTEOPOROSIS TYPE: ICD-10-CM

## 2018-11-15 ENCOUNTER — OFFICE VISIT (OUTPATIENT)
Dept: FAMILY MEDICINE CLINIC | Age: 73
End: 2018-11-15
Payer: MEDICARE

## 2018-11-15 VITALS
WEIGHT: 196 LBS | SYSTOLIC BLOOD PRESSURE: 132 MMHG | HEIGHT: 61 IN | DIASTOLIC BLOOD PRESSURE: 80 MMHG | RESPIRATION RATE: 20 BRPM | HEART RATE: 78 BPM | OXYGEN SATURATION: 96 % | BODY MASS INDEX: 37 KG/M2

## 2018-11-15 DIAGNOSIS — M35.3 POLYMYALGIA RHEUMATICA (HCC): ICD-10-CM

## 2018-11-15 DIAGNOSIS — M81.0 OSTEOPOROSIS WITHOUT CURRENT PATHOLOGICAL FRACTURE, UNSPECIFIED OSTEOPOROSIS TYPE: ICD-10-CM

## 2018-11-15 DIAGNOSIS — I10 ESSENTIAL HYPERTENSION: Primary | ICD-10-CM

## 2018-11-15 DIAGNOSIS — H02.9 EYELID ABNORMALITY: ICD-10-CM

## 2018-11-15 DIAGNOSIS — M05.9 RHEUMATOID ARTHRITIS WITH POSITIVE RHEUMATOID FACTOR, INVOLVING UNSPECIFIED SITE (HCC): ICD-10-CM

## 2018-11-15 PROCEDURE — 1090F PRES/ABSN URINE INCON ASSESS: CPT | Performed by: FAMILY MEDICINE

## 2018-11-15 PROCEDURE — 1036F TOBACCO NON-USER: CPT | Performed by: FAMILY MEDICINE

## 2018-11-15 PROCEDURE — G8417 CALC BMI ABV UP PARAM F/U: HCPCS | Performed by: FAMILY MEDICINE

## 2018-11-15 PROCEDURE — G8482 FLU IMMUNIZE ORDER/ADMIN: HCPCS | Performed by: FAMILY MEDICINE

## 2018-11-15 PROCEDURE — G8427 DOCREV CUR MEDS BY ELIG CLIN: HCPCS | Performed by: FAMILY MEDICINE

## 2018-11-15 PROCEDURE — 4040F PNEUMOC VAC/ADMIN/RCVD: CPT | Performed by: FAMILY MEDICINE

## 2018-11-15 PROCEDURE — 1101F PT FALLS ASSESS-DOCD LE1/YR: CPT | Performed by: FAMILY MEDICINE

## 2018-11-15 PROCEDURE — G8399 PT W/DXA RESULTS DOCUMENT: HCPCS | Performed by: FAMILY MEDICINE

## 2018-11-15 PROCEDURE — 3017F COLORECTAL CA SCREEN DOC REV: CPT | Performed by: FAMILY MEDICINE

## 2018-11-15 PROCEDURE — 99214 OFFICE O/P EST MOD 30 MIN: CPT | Performed by: FAMILY MEDICINE

## 2018-11-15 PROCEDURE — 1123F ACP DISCUSS/DSCN MKR DOCD: CPT | Performed by: FAMILY MEDICINE

## 2018-11-15 RX ORDER — GABAPENTIN 400 MG/1
CAPSULE ORAL
Qty: 360 CAPSULE | Refills: 1 | Status: SHIPPED | OUTPATIENT
Start: 2018-11-15 | End: 2019-08-02

## 2018-11-15 RX ORDER — CARVEDILOL 25 MG/1
25 TABLET ORAL 2 TIMES DAILY
Qty: 180 TABLET | Refills: 3 | Status: SHIPPED | OUTPATIENT
Start: 2018-11-15 | End: 2019-09-18 | Stop reason: SDUPTHER

## 2018-11-15 RX ORDER — ROPINIROLE 1 MG/1
1 TABLET, FILM COATED ORAL 3 TIMES DAILY
Qty: 270 TABLET | Refills: 3 | Status: SHIPPED | OUTPATIENT
Start: 2018-11-15 | End: 2019-01-23 | Stop reason: SDUPTHER

## 2018-11-15 RX ORDER — BUSPIRONE HYDROCHLORIDE 15 MG/1
15 TABLET ORAL 3 TIMES DAILY
Qty: 270 TABLET | Refills: 1 | Status: SHIPPED | OUTPATIENT
Start: 2018-11-15 | End: 2019-05-02 | Stop reason: SDUPTHER

## 2018-11-15 RX ORDER — BUSPIRONE HYDROCHLORIDE 30 MG/1
30 TABLET ORAL 3 TIMES DAILY PRN
Qty: 90 TABLET | Refills: 2 | Status: CANCELLED | OUTPATIENT
Start: 2018-11-15

## 2018-11-15 NOTE — PROGRESS NOTES
SUBJECTIVE:    Patient ID: Robina Veras is a 72 y.o. female. Chief Complaint   Patient presents with    Hypertension     f/u     Back Pain     f/u       HPI:She's here today follow-up on her hypertension. Her blood pressures are doing quite a bit better. She's definitely feeling like they're staying down. She's not having as much headache. She says she is less anxious. She definitely feels like the medication is helped. She says increasing the antidepressant is helped as well. She's not had any significant chest pain or increase in shortness of breath. She is continuing to struggle with some significant arthritic pains. She is continue see pain management about that. She says she is wondering about her bone density. It did show her to have a worsening osteoporosis. She has been taking Reclast shots once a year. She's having a significant abnormality with her right eye. She's had some redness and some drainage and some irritation. She says her eyelashes feel like they're scratching her. Review of Systems   Musculoskeletal: Positive for gait problem. Psychiatric/Behavioral: Positive for agitation. The patient is nervous/anxious. All other systems reviewed and are negative. OBJECTIVE:  Wt Readings from Last 3 Encounters:   11/15/18 196 lb (88.9 kg)   10/30/18 195 lb (88.5 kg)   10/25/18 197 lb 3.2 oz (89.4 kg)     BP Readings from Last 3 Encounters:   11/15/18 132/80   10/30/18 130/82   10/25/18 132/86      Pulse Readings from Last 3 Encounters:   11/15/18 78   10/30/18 70   10/25/18 78     Body mass index is 37.03 kg/m². Resp Readings from Last 3 Encounters:   11/15/18 20   10/30/18 18   10/25/18 20     Physical Exam   Constitutional: She is oriented to person, place, and time. She appears well-developed and well-nourished. HENT:   Head: Normocephalic. Eyes: Pupils are equal, round, and reactive to light. EOM are normal. Right eye exhibits discharge. Right conjunctiva is injected.    Right lower lid does seem to be inverting. Her eyelashes are actually rubbing her eye. He does appear to be the main cause of her irritation. Neck: Normal range of motion. Neck supple. Cardiovascular: Normal rate and regular rhythm. Pulmonary/Chest: Effort normal and breath sounds normal.   Musculoskeletal:        Right knee: She exhibits decreased range of motion. Tenderness found. Medial joint line and lateral joint line tenderness noted. Left knee: She exhibits decreased range of motion. Tenderness found. Medial joint line and lateral joint line tenderness noted. Lumbar back: She exhibits decreased range of motion, tenderness, pain and spasm. Neurological: She is alert and oriented to person, place, and time. Skin: Skin is warm and dry. Psychiatric: Her mood appears anxious. She is agitated. She exhibits a depressed mood. Nursing note and vitals reviewed. No results found for requested labs within last 30 days. LDL Calculated (mg/dL)   Date Value   05/21/2018 92         Lab Results   Component Value Date    WBC 6.5 05/21/2018    NEUTROABS 4.3 07/17/2017    HGB 12.7 05/21/2018    HCT 38.9 05/21/2018    MCV 94.9 05/21/2018     05/21/2018       Lab Results   Component Value Date    TSH 4.14 05/21/2018         ASSESSMENT:    Diagnosis Orders   1. Essential hypertension     2. Eyelid abnormality  External Referral To Ophthalmology   3. Polymyalgia rheumatica (HCC)  gabapentin (NEURONTIN) 400 MG capsule   4. Rheumatoid arthritis with positive rheumatoid factor, involving unspecified site ([facility] [address]     5. Osteoporosis without current pathological fracture, unspecified osteoporosis type          PLAN:  Orders Placed This Encounter   Medications    gabapentin (NEURONTIN) 400 MG capsule     Sig: TAKE 1 CAPSULE FOUR TIMES A DAY.      Dispense:  360 capsule     Refill:  1    carvedilol (COREG) 25 MG tablet     Sig: Take 1 tablet by mouth 2 times daily     Dispense:  180 tablet

## 2018-12-06 ENCOUNTER — NURSE ONLY (OUTPATIENT)
Dept: FAMILY MEDICINE CLINIC | Age: 73
End: 2018-12-06
Payer: MEDICARE

## 2018-12-06 VITALS — SYSTOLIC BLOOD PRESSURE: 130 MMHG | DIASTOLIC BLOOD PRESSURE: 100 MMHG | HEART RATE: 72 BPM | OXYGEN SATURATION: 95 %

## 2018-12-06 DIAGNOSIS — R03.0 ELEVATED BLOOD PRESSURE READING: Primary | ICD-10-CM

## 2018-12-06 PROCEDURE — 96372 THER/PROPH/DIAG INJ SC/IM: CPT | Performed by: FAMILY MEDICINE

## 2018-12-06 RX ORDER — LOSARTAN POTASSIUM 100 MG/1
100 TABLET ORAL DAILY
Qty: 30 TABLET | Refills: 5 | Status: SHIPPED | OUTPATIENT
Start: 2018-12-06 | End: 2019-03-20

## 2018-12-06 RX ORDER — CLONIDINE HYDROCHLORIDE 0.1 MG/1
TABLET ORAL
Qty: 60 TABLET | Refills: 1 | Status: CANCELLED | OUTPATIENT
Start: 2018-12-06

## 2018-12-06 RX ORDER — CLONIDINE HYDROCHLORIDE 0.1 MG/1
0.1 TABLET ORAL ONCE
Status: COMPLETED | OUTPATIENT
Start: 2018-12-06 | End: 2018-12-06

## 2018-12-06 RX ORDER — LOSARTAN POTASSIUM 100 MG/1
100 TABLET ORAL DAILY
Qty: 30 TABLET | Refills: 2 | Status: CANCELLED | OUTPATIENT
Start: 2018-12-06

## 2018-12-06 RX ORDER — CLONIDINE HYDROCHLORIDE 0.1 MG/1
TABLET ORAL
Qty: 60 TABLET | Refills: 1 | Status: SHIPPED | OUTPATIENT
Start: 2018-12-06

## 2018-12-06 RX ADMIN — CLONIDINE HYDROCHLORIDE 0.1 MG: 0.1 TABLET ORAL at 12:59

## 2018-12-06 RX ADMIN — CLONIDINE HYDROCHLORIDE 0.1 MG: 0.1 TABLET ORAL at 11:57

## 2018-12-18 ENCOUNTER — OFFICE VISIT (OUTPATIENT)
Dept: FAMILY MEDICINE CLINIC | Age: 73
End: 2018-12-18
Payer: MEDICARE

## 2018-12-18 VITALS
RESPIRATION RATE: 20 BRPM | HEIGHT: 61 IN | SYSTOLIC BLOOD PRESSURE: 150 MMHG | OXYGEN SATURATION: 96 % | DIASTOLIC BLOOD PRESSURE: 70 MMHG | WEIGHT: 197 LBS | BODY MASS INDEX: 37.19 KG/M2

## 2018-12-18 DIAGNOSIS — N28.89 HYPERTENSION SECONDARY TO OTHER RENAL DISORDERS: Primary | ICD-10-CM

## 2018-12-18 DIAGNOSIS — I15.1 HYPERTENSION SECONDARY TO OTHER RENAL DISORDERS: Primary | ICD-10-CM

## 2018-12-18 PROCEDURE — G8417 CALC BMI ABV UP PARAM F/U: HCPCS | Performed by: FAMILY MEDICINE

## 2018-12-18 PROCEDURE — 1036F TOBACCO NON-USER: CPT | Performed by: FAMILY MEDICINE

## 2018-12-18 PROCEDURE — G8427 DOCREV CUR MEDS BY ELIG CLIN: HCPCS | Performed by: FAMILY MEDICINE

## 2018-12-18 PROCEDURE — 3017F COLORECTAL CA SCREEN DOC REV: CPT | Performed by: FAMILY MEDICINE

## 2018-12-18 PROCEDURE — 99213 OFFICE O/P EST LOW 20 MIN: CPT | Performed by: FAMILY MEDICINE

## 2018-12-18 PROCEDURE — G8399 PT W/DXA RESULTS DOCUMENT: HCPCS | Performed by: FAMILY MEDICINE

## 2018-12-18 PROCEDURE — G8482 FLU IMMUNIZE ORDER/ADMIN: HCPCS | Performed by: FAMILY MEDICINE

## 2018-12-18 PROCEDURE — 1090F PRES/ABSN URINE INCON ASSESS: CPT | Performed by: FAMILY MEDICINE

## 2018-12-18 PROCEDURE — 4040F PNEUMOC VAC/ADMIN/RCVD: CPT | Performed by: FAMILY MEDICINE

## 2018-12-18 PROCEDURE — 1101F PT FALLS ASSESS-DOCD LE1/YR: CPT | Performed by: FAMILY MEDICINE

## 2018-12-18 PROCEDURE — 1123F ACP DISCUSS/DSCN MKR DOCD: CPT | Performed by: FAMILY MEDICINE

## 2018-12-18 PROCEDURE — 96372 THER/PROPH/DIAG INJ SC/IM: CPT | Performed by: FAMILY MEDICINE

## 2018-12-18 RX ORDER — AMLODIPINE BESYLATE 5 MG/1
5 TABLET ORAL DAILY
Qty: 30 TABLET | Refills: 0 | Status: SHIPPED | OUTPATIENT
Start: 2018-12-18 | End: 2019-01-07 | Stop reason: SDUPTHER

## 2018-12-18 RX ORDER — CYANOCOBALAMIN 1000 UG/ML
1000 INJECTION INTRAMUSCULAR; SUBCUTANEOUS ONCE
Status: COMPLETED | OUTPATIENT
Start: 2018-12-18 | End: 2018-12-18

## 2018-12-18 RX ADMIN — CYANOCOBALAMIN 1000 MCG: 1000 INJECTION INTRAMUSCULAR; SUBCUTANEOUS at 14:49

## 2019-01-07 ENCOUNTER — TELEPHONE (OUTPATIENT)
Dept: FAMILY MEDICINE CLINIC | Age: 74
End: 2019-01-07

## 2019-01-07 ENCOUNTER — OFFICE VISIT (OUTPATIENT)
Dept: FAMILY MEDICINE CLINIC | Age: 74
End: 2019-01-07
Payer: MEDICARE

## 2019-01-07 VITALS
SYSTOLIC BLOOD PRESSURE: 110 MMHG | WEIGHT: 195.5 LBS | BODY MASS INDEX: 36.91 KG/M2 | HEIGHT: 61 IN | OXYGEN SATURATION: 97 % | DIASTOLIC BLOOD PRESSURE: 64 MMHG | RESPIRATION RATE: 20 BRPM | HEART RATE: 70 BPM

## 2019-01-07 DIAGNOSIS — R50.9 FEVER, UNSPECIFIED FEVER CAUSE: ICD-10-CM

## 2019-01-07 DIAGNOSIS — R05.9 COUGH: Primary | ICD-10-CM

## 2019-01-07 DIAGNOSIS — I10 ESSENTIAL HYPERTENSION: ICD-10-CM

## 2019-01-07 LAB
INFLUENZA A ANTIBODY: NORMAL
INFLUENZA B ANTIBODY: NORMAL

## 2019-01-07 PROCEDURE — 4040F PNEUMOC VAC/ADMIN/RCVD: CPT | Performed by: FAMILY MEDICINE

## 2019-01-07 PROCEDURE — 1090F PRES/ABSN URINE INCON ASSESS: CPT | Performed by: FAMILY MEDICINE

## 2019-01-07 PROCEDURE — 87804 INFLUENZA ASSAY W/OPTIC: CPT | Performed by: FAMILY MEDICINE

## 2019-01-07 PROCEDURE — G8417 CALC BMI ABV UP PARAM F/U: HCPCS | Performed by: FAMILY MEDICINE

## 2019-01-07 PROCEDURE — 1123F ACP DISCUSS/DSCN MKR DOCD: CPT | Performed by: FAMILY MEDICINE

## 2019-01-07 PROCEDURE — G8399 PT W/DXA RESULTS DOCUMENT: HCPCS | Performed by: FAMILY MEDICINE

## 2019-01-07 PROCEDURE — 99213 OFFICE O/P EST LOW 20 MIN: CPT | Performed by: FAMILY MEDICINE

## 2019-01-07 PROCEDURE — G8427 DOCREV CUR MEDS BY ELIG CLIN: HCPCS | Performed by: FAMILY MEDICINE

## 2019-01-07 PROCEDURE — G8482 FLU IMMUNIZE ORDER/ADMIN: HCPCS | Performed by: FAMILY MEDICINE

## 2019-01-07 PROCEDURE — 96372 THER/PROPH/DIAG INJ SC/IM: CPT | Performed by: FAMILY MEDICINE

## 2019-01-07 PROCEDURE — 94640 AIRWAY INHALATION TREATMENT: CPT | Performed by: FAMILY MEDICINE

## 2019-01-07 PROCEDURE — 3017F COLORECTAL CA SCREEN DOC REV: CPT | Performed by: FAMILY MEDICINE

## 2019-01-07 PROCEDURE — 1101F PT FALLS ASSESS-DOCD LE1/YR: CPT | Performed by: FAMILY MEDICINE

## 2019-01-07 PROCEDURE — 1036F TOBACCO NON-USER: CPT | Performed by: FAMILY MEDICINE

## 2019-01-07 RX ORDER — LEVOFLOXACIN 500 MG/1
500 TABLET, FILM COATED ORAL DAILY
Qty: 10 TABLET | Refills: 0 | Status: SHIPPED | OUTPATIENT
Start: 2019-01-07 | End: 2019-01-17

## 2019-01-07 RX ORDER — AMLODIPINE BESYLATE 5 MG/1
5 TABLET ORAL DAILY
Qty: 30 TABLET | Refills: 5 | Status: SHIPPED | OUTPATIENT
Start: 2019-01-07 | End: 2019-07-24 | Stop reason: SDUPTHER

## 2019-01-07 RX ORDER — METHYLPREDNISOLONE SODIUM SUCCINATE 125 MG/2ML
125 INJECTION, POWDER, LYOPHILIZED, FOR SOLUTION INTRAMUSCULAR; INTRAVENOUS ONCE
Status: COMPLETED | OUTPATIENT
Start: 2019-01-07 | End: 2019-01-07

## 2019-01-07 RX ORDER — CEFTRIAXONE 1 G/1
1 INJECTION, POWDER, FOR SOLUTION INTRAMUSCULAR; INTRAVENOUS ONCE
Status: COMPLETED | OUTPATIENT
Start: 2019-01-07 | End: 2019-01-07

## 2019-01-07 RX ORDER — GUAIFENESIN 600 MG/1
1200 TABLET, EXTENDED RELEASE ORAL 2 TIMES DAILY PRN
Qty: 60 TABLET | Refills: 0 | Status: SHIPPED | OUTPATIENT
Start: 2019-01-07 | End: 2019-06-20 | Stop reason: SDUPTHER

## 2019-01-07 RX ORDER — GUAIFENESIN 600 MG/1
1200 TABLET, EXTENDED RELEASE ORAL 2 TIMES DAILY PRN
COMMUNITY
Start: 2019-01-07 | End: 2019-01-07 | Stop reason: SDUPTHER

## 2019-01-07 RX ORDER — ALBUTEROL SULFATE 2.5 MG/3ML
2.5 SOLUTION RESPIRATORY (INHALATION) ONCE
Status: COMPLETED | OUTPATIENT
Start: 2019-01-07 | End: 2019-01-07

## 2019-01-07 RX ADMIN — ALBUTEROL SULFATE 2.5 MG: 2.5 SOLUTION RESPIRATORY (INHALATION) at 11:22

## 2019-01-07 RX ADMIN — CEFTRIAXONE 1 G: 1 INJECTION, POWDER, FOR SOLUTION INTRAMUSCULAR; INTRAVENOUS at 11:23

## 2019-01-07 RX ADMIN — METHYLPREDNISOLONE SODIUM SUCCINATE 125 MG: 125 INJECTION, POWDER, LYOPHILIZED, FOR SOLUTION INTRAMUSCULAR; INTRAVENOUS at 11:23

## 2019-01-17 DIAGNOSIS — N28.89 HYPERTENSION SECONDARY TO OTHER RENAL DISORDERS: ICD-10-CM

## 2019-01-17 DIAGNOSIS — I15.1 HYPERTENSION SECONDARY TO OTHER RENAL DISORDERS: ICD-10-CM

## 2019-01-23 RX ORDER — VENLAFAXINE 75 MG/1
75 TABLET ORAL 2 TIMES DAILY
Qty: 60 TABLET | Refills: 2 | Status: SHIPPED | OUTPATIENT
Start: 2019-01-23 | End: 2019-03-20 | Stop reason: SDUPTHER

## 2019-01-23 RX ORDER — ROPINIROLE 1 MG/1
1 TABLET, FILM COATED ORAL 3 TIMES DAILY
Qty: 270 TABLET | Refills: 3 | Status: SHIPPED | OUTPATIENT
Start: 2019-01-23 | End: 2019-08-02

## 2019-02-19 RX ORDER — MONTELUKAST SODIUM 10 MG/1
TABLET ORAL
Qty: 90 TABLET | Refills: 2 | Status: SHIPPED | OUTPATIENT
Start: 2019-02-19 | End: 2019-09-18 | Stop reason: SDUPTHER

## 2019-03-20 ENCOUNTER — OFFICE VISIT (OUTPATIENT)
Dept: FAMILY MEDICINE CLINIC | Age: 74
End: 2019-03-20
Payer: MEDICARE

## 2019-03-20 VITALS
HEART RATE: 88 BPM | BODY MASS INDEX: 36.63 KG/M2 | OXYGEN SATURATION: 98 % | HEIGHT: 61 IN | RESPIRATION RATE: 20 BRPM | DIASTOLIC BLOOD PRESSURE: 78 MMHG | WEIGHT: 194 LBS | SYSTOLIC BLOOD PRESSURE: 117 MMHG

## 2019-03-20 DIAGNOSIS — F32.A DEPRESSION, UNSPECIFIED DEPRESSION TYPE: ICD-10-CM

## 2019-03-20 DIAGNOSIS — M81.0 OSTEOPOROSIS WITHOUT CURRENT PATHOLOGICAL FRACTURE, UNSPECIFIED OSTEOPOROSIS TYPE: ICD-10-CM

## 2019-03-20 DIAGNOSIS — G89.29 CHRONIC MIDLINE LOW BACK PAIN, WITH SCIATICA PRESENCE UNSPECIFIED: ICD-10-CM

## 2019-03-20 DIAGNOSIS — I10 ESSENTIAL HYPERTENSION: Primary | ICD-10-CM

## 2019-03-20 DIAGNOSIS — M05.9 RHEUMATOID ARTHRITIS WITH POSITIVE RHEUMATOID FACTOR, INVOLVING UNSPECIFIED SITE (HCC): ICD-10-CM

## 2019-03-20 DIAGNOSIS — M54.5 CHRONIC MIDLINE LOW BACK PAIN, WITH SCIATICA PRESENCE UNSPECIFIED: ICD-10-CM

## 2019-03-20 PROCEDURE — 1090F PRES/ABSN URINE INCON ASSESS: CPT | Performed by: FAMILY MEDICINE

## 2019-03-20 PROCEDURE — G8427 DOCREV CUR MEDS BY ELIG CLIN: HCPCS | Performed by: FAMILY MEDICINE

## 2019-03-20 PROCEDURE — 99214 OFFICE O/P EST MOD 30 MIN: CPT | Performed by: FAMILY MEDICINE

## 2019-03-20 PROCEDURE — 4040F PNEUMOC VAC/ADMIN/RCVD: CPT | Performed by: FAMILY MEDICINE

## 2019-03-20 PROCEDURE — G8417 CALC BMI ABV UP PARAM F/U: HCPCS | Performed by: FAMILY MEDICINE

## 2019-03-20 PROCEDURE — 1036F TOBACCO NON-USER: CPT | Performed by: FAMILY MEDICINE

## 2019-03-20 PROCEDURE — G8482 FLU IMMUNIZE ORDER/ADMIN: HCPCS | Performed by: FAMILY MEDICINE

## 2019-03-20 PROCEDURE — 3017F COLORECTAL CA SCREEN DOC REV: CPT | Performed by: FAMILY MEDICINE

## 2019-03-20 PROCEDURE — G8399 PT W/DXA RESULTS DOCUMENT: HCPCS | Performed by: FAMILY MEDICINE

## 2019-03-20 PROCEDURE — 1123F ACP DISCUSS/DSCN MKR DOCD: CPT | Performed by: FAMILY MEDICINE

## 2019-03-20 RX ORDER — VENLAFAXINE 75 MG/1
75 TABLET ORAL 2 TIMES DAILY
Qty: 60 TABLET | Refills: 5 | Status: SHIPPED | OUTPATIENT
Start: 2019-03-20 | End: 2019-05-02 | Stop reason: SDUPTHER

## 2019-03-20 RX ORDER — RALOXIFENE HYDROCHLORIDE 60 MG/1
60 TABLET, FILM COATED ORAL DAILY
Qty: 30 TABLET | Refills: 5 | Status: SHIPPED | OUTPATIENT
Start: 2019-03-20 | End: 2019-05-02

## 2019-03-21 ENCOUNTER — TELEPHONE (OUTPATIENT)
Dept: FAMILY MEDICINE CLINIC | Age: 74
End: 2019-03-21

## 2019-05-02 ENCOUNTER — OFFICE VISIT (OUTPATIENT)
Dept: FAMILY MEDICINE CLINIC | Age: 74
End: 2019-05-02
Payer: MEDICARE

## 2019-05-02 VITALS
BODY MASS INDEX: 37.95 KG/M2 | SYSTOLIC BLOOD PRESSURE: 130 MMHG | OXYGEN SATURATION: 98 % | RESPIRATION RATE: 20 BRPM | HEIGHT: 61 IN | DIASTOLIC BLOOD PRESSURE: 72 MMHG | HEART RATE: 76 BPM | WEIGHT: 201 LBS

## 2019-05-02 DIAGNOSIS — F32.A DEPRESSION, UNSPECIFIED DEPRESSION TYPE: ICD-10-CM

## 2019-05-02 DIAGNOSIS — M81.0 OSTEOPOROSIS WITHOUT CURRENT PATHOLOGICAL FRACTURE, UNSPECIFIED OSTEOPOROSIS TYPE: ICD-10-CM

## 2019-05-02 DIAGNOSIS — I10 ESSENTIAL HYPERTENSION: Primary | ICD-10-CM

## 2019-05-02 DIAGNOSIS — M54.5 CHRONIC MIDLINE LOW BACK PAIN, WITH SCIATICA PRESENCE UNSPECIFIED: ICD-10-CM

## 2019-05-02 DIAGNOSIS — G89.29 CHRONIC MIDLINE LOW BACK PAIN, WITH SCIATICA PRESENCE UNSPECIFIED: ICD-10-CM

## 2019-05-02 DIAGNOSIS — M05.9 RHEUMATOID ARTHRITIS WITH POSITIVE RHEUMATOID FACTOR, INVOLVING UNSPECIFIED SITE (HCC): ICD-10-CM

## 2019-05-02 PROCEDURE — 1123F ACP DISCUSS/DSCN MKR DOCD: CPT | Performed by: FAMILY MEDICINE

## 2019-05-02 PROCEDURE — 1090F PRES/ABSN URINE INCON ASSESS: CPT | Performed by: FAMILY MEDICINE

## 2019-05-02 PROCEDURE — 1036F TOBACCO NON-USER: CPT | Performed by: FAMILY MEDICINE

## 2019-05-02 PROCEDURE — G8417 CALC BMI ABV UP PARAM F/U: HCPCS | Performed by: FAMILY MEDICINE

## 2019-05-02 PROCEDURE — 4040F PNEUMOC VAC/ADMIN/RCVD: CPT | Performed by: FAMILY MEDICINE

## 2019-05-02 PROCEDURE — G8427 DOCREV CUR MEDS BY ELIG CLIN: HCPCS | Performed by: FAMILY MEDICINE

## 2019-05-02 PROCEDURE — G8399 PT W/DXA RESULTS DOCUMENT: HCPCS | Performed by: FAMILY MEDICINE

## 2019-05-02 PROCEDURE — 99214 OFFICE O/P EST MOD 30 MIN: CPT | Performed by: FAMILY MEDICINE

## 2019-05-02 PROCEDURE — 3017F COLORECTAL CA SCREEN DOC REV: CPT | Performed by: FAMILY MEDICINE

## 2019-05-02 RX ORDER — VENLAFAXINE 75 MG/1
75 TABLET ORAL 2 TIMES DAILY
Qty: 180 TABLET | Refills: 3 | Status: SHIPPED | OUTPATIENT
Start: 2019-05-02 | End: 2020-01-22

## 2019-05-02 RX ORDER — ZOLEDRONIC ACID 5 MG/100ML
5 INJECTION, SOLUTION INTRAVENOUS ONCE
Qty: 100 ML | Refills: 0 | Status: SHIPPED | OUTPATIENT
Start: 2019-05-02 | End: 2019-06-20 | Stop reason: ALTCHOICE

## 2019-05-02 RX ORDER — BUSPIRONE HYDROCHLORIDE 15 MG/1
15 TABLET ORAL 3 TIMES DAILY
Qty: 270 TABLET | Refills: 1 | Status: SHIPPED | OUTPATIENT
Start: 2019-05-02 | End: 2019-08-02

## 2019-05-02 NOTE — PROGRESS NOTES
Have you seen any other physician or provider since your last visit no    Have you had any other diagnostic tests since your last visit? no    Have you changed or stopped any medications since your last visit? no
I have recommended that this patient have a mammogram but she declines at this time. I have discussed the risks and benefits of this examination with her. The patient verbalizes understanding.
270 tablet     Refill:  1    venlafaxine (EFFEXOR) 75 MG tablet     Sig: Take 1 tablet by mouth 2 times daily     Dispense:  180 tablet     Refill:  3    denosumab (PROLIA) 60 MG/ML SOSY SC injection     Sig: Inject 1 mL into the skin once for 1 dose     Dispense:  1 mL     Refill:  5    zoledronic acid (RECLAST) 5 MG/100ML SOLN     Sig: Infuse 100 mLs intravenously once for 1 dose     Dispense:  100 mL     Refill:  0           Medications Discontinued During This Encounter   Medication Reason    raloxifene (EVISTA) 60 MG tablet LIST CLEANUP    busPIRone (BUSPAR) 15 MG tablet REORDER    venlafaxine (EFFEXOR) 75 MG tablet REORDER       Controlled Substances Monitoring:

## 2019-06-20 ENCOUNTER — OFFICE VISIT (OUTPATIENT)
Dept: FAMILY MEDICINE CLINIC | Age: 74
End: 2019-06-20
Payer: MEDICARE

## 2019-06-20 VITALS
WEIGHT: 198 LBS | TEMPERATURE: 97.4 F | SYSTOLIC BLOOD PRESSURE: 110 MMHG | DIASTOLIC BLOOD PRESSURE: 64 MMHG | OXYGEN SATURATION: 96 % | HEART RATE: 86 BPM | BODY MASS INDEX: 37.38 KG/M2 | HEIGHT: 61 IN | RESPIRATION RATE: 18 BRPM

## 2019-06-20 DIAGNOSIS — J18.9 PNEUMONIA OF BOTH LOWER LOBES DUE TO INFECTIOUS ORGANISM: Primary | ICD-10-CM

## 2019-06-20 PROCEDURE — G8427 DOCREV CUR MEDS BY ELIG CLIN: HCPCS | Performed by: NURSE PRACTITIONER

## 2019-06-20 PROCEDURE — G8417 CALC BMI ABV UP PARAM F/U: HCPCS | Performed by: NURSE PRACTITIONER

## 2019-06-20 PROCEDURE — 1123F ACP DISCUSS/DSCN MKR DOCD: CPT | Performed by: NURSE PRACTITIONER

## 2019-06-20 PROCEDURE — 4040F PNEUMOC VAC/ADMIN/RCVD: CPT | Performed by: NURSE PRACTITIONER

## 2019-06-20 PROCEDURE — G8399 PT W/DXA RESULTS DOCUMENT: HCPCS | Performed by: NURSE PRACTITIONER

## 2019-06-20 PROCEDURE — 96372 THER/PROPH/DIAG INJ SC/IM: CPT | Performed by: NURSE PRACTITIONER

## 2019-06-20 PROCEDURE — 99213 OFFICE O/P EST LOW 20 MIN: CPT | Performed by: NURSE PRACTITIONER

## 2019-06-20 PROCEDURE — 1090F PRES/ABSN URINE INCON ASSESS: CPT | Performed by: NURSE PRACTITIONER

## 2019-06-20 PROCEDURE — 3017F COLORECTAL CA SCREEN DOC REV: CPT | Performed by: NURSE PRACTITIONER

## 2019-06-20 PROCEDURE — 1036F TOBACCO NON-USER: CPT | Performed by: NURSE PRACTITIONER

## 2019-06-20 PROCEDURE — 94640 AIRWAY INHALATION TREATMENT: CPT | Performed by: NURSE PRACTITIONER

## 2019-06-20 RX ORDER — PREDNISONE 10 MG/1
10 TABLET ORAL DAILY
Qty: 5 TABLET | Refills: 0 | Status: SHIPPED | OUTPATIENT
Start: 2019-06-20 | End: 2019-06-25

## 2019-06-20 RX ORDER — LEVOFLOXACIN 500 MG/1
500 TABLET, FILM COATED ORAL DAILY
Qty: 7 TABLET | Refills: 0 | Status: SHIPPED | OUTPATIENT
Start: 2019-06-20 | End: 2019-06-27

## 2019-06-20 RX ORDER — CEFTRIAXONE 1 G/1
1 INJECTION, POWDER, FOR SOLUTION INTRAMUSCULAR; INTRAVENOUS ONCE
Status: COMPLETED | OUTPATIENT
Start: 2019-06-20 | End: 2019-06-20

## 2019-06-20 RX ORDER — IPRATROPIUM BROMIDE AND ALBUTEROL SULFATE 2.5; .5 MG/3ML; MG/3ML
1 SOLUTION RESPIRATORY (INHALATION) EVERY 4 HOURS
Qty: 180 ML | Refills: 0 | Status: SHIPPED | OUTPATIENT
Start: 2019-06-20 | End: 2019-09-18 | Stop reason: SDUPTHER

## 2019-06-20 RX ORDER — GUAIFENESIN 600 MG/1
1200 TABLET, EXTENDED RELEASE ORAL 2 TIMES DAILY PRN
Qty: 60 TABLET | Refills: 0 | Status: SHIPPED | OUTPATIENT
Start: 2019-06-20 | End: 2019-08-02

## 2019-06-20 RX ORDER — IPRATROPIUM BROMIDE AND ALBUTEROL SULFATE 2.5; .5 MG/3ML; MG/3ML
1 SOLUTION RESPIRATORY (INHALATION) ONCE
Status: COMPLETED | OUTPATIENT
Start: 2019-06-20 | End: 2019-06-20

## 2019-06-20 RX ADMIN — CEFTRIAXONE 1 G: 1 INJECTION, POWDER, FOR SOLUTION INTRAMUSCULAR; INTRAVENOUS at 15:54

## 2019-06-20 RX ADMIN — IPRATROPIUM BROMIDE AND ALBUTEROL SULFATE 1 AMPULE: 2.5; .5 SOLUTION RESPIRATORY (INHALATION) at 15:55

## 2019-06-20 ASSESSMENT — ENCOUNTER SYMPTOMS
CHEST TIGHTNESS: 1
WHEEZING: 1
SPUTUM PRODUCTION: 0
SHORTNESS OF BREATH: 1
COUGH: 1

## 2019-06-20 NOTE — PROGRESS NOTES
SUBJECTIVE:    Orion Tong is a 69 y.o. female . Chief Complaint   Patient presents with    Cough     symptoms started 2 weeks ago    Congestion    Wheezing        HPI:   Presents with c/o cough, wheezing, SOA, x 2 weeks. She denies fever or chills. She has not been able to come in as her  has been in the hospital.  She feels fatigued. She is noted to be using her accessory muscles somewhat to breathe. Cough   This is a new problem. Episode onset: 2 weeks. The problem has been gradually worsening. The problem occurs every few minutes. The cough is non-productive. Associated symptoms include shortness of breath and wheezing. Pertinent negatives include no chest pain. Nothing aggravates the symptoms. Risk factors for lung disease include animal exposure. She has tried a beta-agonist inhaler for the symptoms. The treatment provided no relief. Her past medical history is significant for COPD. Wheezing    This is a new problem. Episode onset: 2 weeks. The problem occurs constantly. The problem has been gradually worsening. Associated symptoms include coughing and shortness of breath. Pertinent negatives include no chest pain or sputum production. Nothing aggravates the symptoms. She has tried beta agonist inhalers (neb treatment) for the symptoms. The treatment provided no relief. Her past medical history is significant for chronic lung disease and COPD. Allergies   Allergen Reactions    Lisinopril Other (See Comments)     Cough till she passed out    Tape Spiro Gosia Tape] Rash     Surgical Tape      Past Medical History:   Diagnosis Date    Chronic back pain     Compression fx, thoracic spine (HCC)     Depression     History of cataract surgery     Hypertension     Obesity     Osteoporosis     Trigger point of right side of body       No family history on file.    Social History     Tobacco Use    Smoking status: Never Smoker    Smokeless tobacco: Never Used lb (89.8 kg)   SpO2 96% Comment: room air  Breastfeeding? No   BMI 37.41 kg/m²   BP Readings from Last 3 Encounters:   06/20/19 110/64   05/02/19 130/72   03/20/19 117/78     Body mass index is 37.41 kg/m². Physical Exam   Constitutional: She is oriented to person, place, and time. She appears well-developed and well-nourished. HENT:   Head: Normocephalic and atraumatic. Right Ear: External ear normal.   Left Ear: External ear normal.   Nose: Nose normal.   Mouth/Throat: Oropharynx is clear and moist.   Eyes: Pupils are equal, round, and reactive to light. Conjunctivae and EOM are normal.   Neck: Normal range of motion. Neck supple. Cardiovascular: Normal rate, regular rhythm, normal heart sounds and intact distal pulses. Pulmonary/Chest: Effort normal. She has decreased breath sounds in the right upper field and the left upper field. She has wheezes in the right upper field, the right middle field, the right lower field, the left upper field, the left middle field and the left lower field. She has rhonchi in the right lower field and the left lower field. Mild accessory muscle use   Abdominal: Soft. Bowel sounds are normal.   Musculoskeletal: Normal range of motion. Neurological: She is alert and oriented to person, place, and time. She has normal reflexes. Skin: Skin is warm and dry. Capillary refill takes less than 2 seconds. Psychiatric: She has a normal mood and affect. Her behavior is normal. Judgment and thought content normal.   Nursing note and vitals reviewed. SYD / Amena Kaitlin was seen today for cough, congestion and wheezing. Diagnoses and all orders for this visit:    Pneumonia of both lower lobes due to infectious organism ([facility] [address]  -     ipratropium-albuterol (DUONEB) nebulizer solution 1 ampule  -     levofloxacin (LEVAQUIN) 500 MG tablet; Take 1 tablet by mouth daily for 7 days  -     predniSONE (DELTASONE) 10 MG tablet;  Take 1 tablet by mouth daily for 5 days  -     cefTRIAXone (ROCEPHIN) injection 1 g  -     ipratropium-albuterol (DUONEB) 0.5-2.5 (3) MG/3ML SOLN nebulizer solution; Inhale 3 mLs into the lungs every 4 hours  -     guaiFENesin (MUCINEX) 600 MG extended release tablet; Take 2 tablets by mouth 2 times daily as needed for Congestion    - Increase Fluids   Tylenol or ibuprofen for pain/fever   Finish all antibiotics even if you begin to feel better. Return to clinic of proceed to local Ed if symptoms worsen or do not improve. Medications Discontinued During This Encounter   Medication Reason    denosumab (PROLIA) 60 MG/ML SOLN SC injection Therapy completed    denosumab (PROLIA) 60 MG/ML SOSY SC injection Therapy completed    zoledronic acid (RECLAST) 5 MG/100ML SOLN Therapy completed    albuterol (PROVENTIL) (2.5 MG/3ML) 0.083% nebulizer solution Therapy completed    guaiFENesin (MUCINEX) 600 MG extended release tablet REORDER       PATIENT COUNSELING  Counseling was provided today regarding the following topics: Healthy eating habits, Regular exercise, substance abuse and healthy sleep habits. Discussed use, benefit, and side effects of prescribed medications. Barriers to medication compliance addressed. All patient questions answered. Patient voices understanding. Return if symptoms worsen or fail to improve. KAMILLE Cordoba     Much of this encounter note is an electronic transcription of spoken language to printed text. Electronic transcription of spoken language may permit erroneous words or phrases to be inadvertently transcribed. Although I have reviewed this note for such errors, some may still exist in this documentation.

## 2019-06-20 NOTE — PROGRESS NOTES
Administrations This Visit     cefTRIAXone (ROCEPHIN) injection 1 g     Admin Date  06/20/2019  15:54 Action  Given Dose  1 g Route  Intramuscular Site  Ventrogluteal Left  Administered By  Meera Yang MA    Ordering Provider:  KAMILLE Vargas    NDC:  31765-930-36    Lot#:  [id number]    :  [address]    Patient Supplied?:  No          ipratropium-albuterol (DUONEB) nebulizer solution 1 ampule     Admin Date  06/20/2019  15:55 Action  Given Dose  1 ampule Route  Inhalation Site  Other Administered By  Meera Yang MA    Ordering Provider:  KAMILLE Vargas    NDC:  6345-0594-59    Lot#:  [id number]    :  [id number] [facility].     Patient Supplied?:  No

## 2019-06-20 NOTE — PATIENT INSTRUCTIONS
dispose of used patches by folding them in half so that the sticky sides meet, and then flushing them down a toilet. They should not be placed in the household trash where children or pets can find them. · If you have any questions, ask your provider or pharmacist for more information. · Be sure to keep all appointments for provider visits or tests. We are committed to providing you with the best care possible. In order to help us achieve these goals please remember to bring all medications, herbal products, and over the counter supplements with you to each visit. If your provider has ordered testing for you, please be sure to follow up with our office if you have not received results within 7 days after the testing took place. *If you receive a survey after visiting one of our offices, please take time to share your experience concerning your physician office visit. These surveys are confidential and no health information about you is shared. We are eager to improve for you and we are counting on your feedback to help make that happen. Patient Education        Pneumonia: Care Instructions  Your Care Instructions    Pneumonia is an infection of the lungs. Most cases are caused by infections from bacteria or viruses. Pneumonia may be mild or very severe. If it is caused by bacteria, you will be treated with antibiotics. It may take a few weeks to a few months to recover fully from pneumonia, depending on how sick you were and whether your overall health is good. Follow-up care is a key part of your treatment and safety. Be sure to make and go to all appointments, and call your doctor if you are having problems. It's also a good idea to know your test results and keep a list of the medicines you take. How can you care for yourself at home? Take your antibiotics exactly as directed. Do not stop taking the medicine just because you are feeling better.  You need to take the full course of antibiotics. Take your medicines exactly as prescribed. Call your doctor if you think you are having a problem with your medicine. Get plenty of rest and sleep. You may feel weak and tired for a while, but your energy level will improve with time. To prevent dehydration, drink plenty of fluids, enough so that your urine is light yellow or clear like water. Choose water and other caffeine-free clear liquids until you feel better. If you have kidney, heart, or liver disease and have to limit fluids, talk with your doctor before you increase the amount of fluids you drink. Take care of your cough so you can rest. A cough that brings up mucus from your lungs is common with pneumonia. It is one way your body gets rid of the infection. But if coughing keeps you from resting or causes severe fatigue and chest-wall pain, talk to your doctor. He or she may suggest that you take a medicine to reduce the cough. Use a vaporizer or humidifier to add moisture to your bedroom. Follow the directions for cleaning the machine. Do not smoke or allow others to smoke around you. Smoke will make your cough last longer. If you need help quitting, talk to your doctor about stop-smoking programs and medicines. These can increase your chances of quitting for good. Take an over-the-counter pain medicine, such as acetaminophen (Tylenol), ibuprofen (Advil, Motrin), or naproxen (Aleve). Read and follow all instructions on the label. Do not take two or more pain medicines at the same time unless the doctor told you to. Many pain medicines have acetaminophen, which is Tylenol. Too much acetaminophen (Tylenol) can be harmful. If you were given a spirometer to measure how well your lungs are working, use it as instructed. This can help your doctor tell how your recovery is going.   To prevent pneumonia in the future, talk to your doctor about getting a flu vaccine (once a year) and a pneumococcal vaccine (one time only for most people). When should you call for help? Call 911 anytime you think you may need emergency care. For example, call if:    You have severe trouble breathing.    Call your doctor now or seek immediate medical care if:    You cough up dark brown or bloody mucus (sputum).     You have new or worse trouble breathing.     You are dizzy or lightheaded, or you feel like you may faint.    Watch closely for changes in your health, and be sure to contact your doctor if:    You have a new or higher fever.     You are coughing more deeply or more often.     You are not getting better after 2 days (48 hours).     You do not get better as expected. Where can you learn more? Go to https://[email or web address].[employer]. org and sign in to your [employer] account. Enter D336 in the [employer] box to learn more about \"Pneumonia: Care Instructions. \"     If you do not have an account, please click on the \"Sign Up Now\" link. Current as of: September 5, 2018  Content Version: 12.0  © 6993-8317 Healthwise, Incorporated. Care instructions adapted under license by [facility] [employer] [facility] ([facility]). If you have questions about a medical condition or this instruction, always ask your healthcare professional. [address] any warranty or liability for your use of this information.

## 2019-06-28 PROBLEM — J18.9 PNEUMONIA OF BOTH LOWER LOBES DUE TO INFECTIOUS ORGANISM: Status: ACTIVE | Noted: 2019-06-28

## 2019-07-02 ENCOUNTER — OFFICE VISIT (OUTPATIENT)
Dept: FAMILY MEDICINE CLINIC | Age: 74
End: 2019-07-02
Payer: MEDICARE

## 2019-07-02 VITALS
RESPIRATION RATE: 18 BRPM | WEIGHT: 197 LBS | HEIGHT: 61 IN | HEART RATE: 82 BPM | OXYGEN SATURATION: 95 % | DIASTOLIC BLOOD PRESSURE: 68 MMHG | BODY MASS INDEX: 37.19 KG/M2 | SYSTOLIC BLOOD PRESSURE: 118 MMHG

## 2019-07-02 DIAGNOSIS — J18.9 PNEUMONIA OF BOTH LOWER LOBES DUE TO INFECTIOUS ORGANISM: ICD-10-CM

## 2019-07-02 DIAGNOSIS — R06.02 SHORTNESS OF BREATH: Primary | ICD-10-CM

## 2019-07-02 PROCEDURE — G8417 CALC BMI ABV UP PARAM F/U: HCPCS | Performed by: NURSE PRACTITIONER

## 2019-07-02 PROCEDURE — 1036F TOBACCO NON-USER: CPT | Performed by: NURSE PRACTITIONER

## 2019-07-02 PROCEDURE — G8399 PT W/DXA RESULTS DOCUMENT: HCPCS | Performed by: NURSE PRACTITIONER

## 2019-07-02 PROCEDURE — 3017F COLORECTAL CA SCREEN DOC REV: CPT | Performed by: NURSE PRACTITIONER

## 2019-07-02 PROCEDURE — G8427 DOCREV CUR MEDS BY ELIG CLIN: HCPCS | Performed by: NURSE PRACTITIONER

## 2019-07-02 PROCEDURE — 1123F ACP DISCUSS/DSCN MKR DOCD: CPT | Performed by: NURSE PRACTITIONER

## 2019-07-02 PROCEDURE — 4040F PNEUMOC VAC/ADMIN/RCVD: CPT | Performed by: NURSE PRACTITIONER

## 2019-07-02 PROCEDURE — 1090F PRES/ABSN URINE INCON ASSESS: CPT | Performed by: NURSE PRACTITIONER

## 2019-07-02 PROCEDURE — 99213 OFFICE O/P EST LOW 20 MIN: CPT | Performed by: NURSE PRACTITIONER

## 2019-07-02 RX ORDER — CEFTRIAXONE 1 G/1
1 INJECTION, POWDER, FOR SOLUTION INTRAMUSCULAR; INTRAVENOUS ONCE
Status: DISCONTINUED | OUTPATIENT
Start: 2019-07-02 | End: 2019-07-02

## 2019-07-02 NOTE — PROGRESS NOTES
SUBJECTIVE:    Almas Taylor is a 69 y.o. female . Chief Complaint   Patient presents with    Wheezing     x 4 weeks, dx with pneumonia a couple of weeks ago    Congestion        HPI:   Present to with continued c/o SOA, coughing, wheezing, weakness, fatigue. Her cough is minimally productive. She was seen by me 12 days ago and treatment for suspected bilateral pneumonia with,    -     levofloxacin (LEVAQUIN) 500 MG tablet daily x 7 days, predniSONE 10 MG tablet daily for 5 days, Rocephin 1G IM, DueNeb 0.5-2.5 (3) MG/3ML SOLN nebulizer solution; Inhale 3 mLs into the lungs every 4 hours and guaiFENesin 600 MG extended release tablet bid. She reports she began to feel a \"little\" better while on antibiotics, but as soon a completed began to feel sick again. She had declined referral for hospital admission at her last visit as her  had just been discharged from the hospital and she felt she could not leave him at that time. She does have history of COPD. She is using Duoneb  and Breo at home with no relief of SOA. Allergies   Allergen Reactions    Lisinopril Other (See Comments)     Cough till she passed out    Tape Jyoti Sides Tape] Rash     Surgical Tape      Past Medical History:   Diagnosis Date    Chronic back pain     Compression fx, thoracic spine (HCC)     Depression     History of cataract surgery     Hypertension     Obesity     Osteoporosis     Trigger point of right side of body       No family history on file. Social History     Tobacco Use    Smoking status: Never Smoker    Smokeless tobacco: Never Used   Substance Use Topics    Alcohol use: No    Drug use: No        Patient's medications, allergies, past medical, surgical, social and family history were reviewed and updated as appropirate.     Current Outpatient Medications   Medication Sig Dispense Refill    ipratropium-albuterol (DUONEB) 0.5-2.5 (3) MG/3ML SOLN nebulizer solution Inhale 3 mLs into the arrival.    Medications Discontinued During This Encounter   Medication Reason    cefTRIAXone (ROCEPHIN) injection 1 g      Quality & Risk Score Accuracy    Last edited 07/21/19 23:24 EDT by KAMILLE Ham       PATIENT COUNSELING  Counseling was provided today regarding the following topics: Healthy eating habits, Regular exercise, substance abuse and healthy sleep habits. Discussed use, benefit, and side effects of prescribed medications. Barriers to medication compliance addressed. All patient questions answered. Patient voices understanding. Return if symptoms worsen or fail to improve. KAMILLE Ham     Much of this encounter note is an electronic transcription of spoken language to printed text. Electronic transcription of spoken language may permit erroneous words or phrases to be inadvertently transcribed. Although I have reviewed this note for such errors, some may still exist in this documentation.

## 2019-07-21 ASSESSMENT — ENCOUNTER SYMPTOMS
SHORTNESS OF BREATH: 1
CHEST TIGHTNESS: 1
COUGH: 1

## 2019-07-24 ENCOUNTER — TELEPHONE (OUTPATIENT)
Dept: FAMILY MEDICINE CLINIC | Age: 74
End: 2019-07-24

## 2019-07-24 ENCOUNTER — OFFICE VISIT (OUTPATIENT)
Dept: FAMILY MEDICINE CLINIC | Age: 74
End: 2019-07-24
Payer: MEDICARE

## 2019-07-24 VITALS
BODY MASS INDEX: 36.82 KG/M2 | HEIGHT: 61 IN | RESPIRATION RATE: 18 BRPM | DIASTOLIC BLOOD PRESSURE: 78 MMHG | SYSTOLIC BLOOD PRESSURE: 126 MMHG | OXYGEN SATURATION: 98 % | HEART RATE: 102 BPM | WEIGHT: 195 LBS

## 2019-07-24 DIAGNOSIS — K62.5 BRIGHT RED RECTAL BLEEDING: Primary | ICD-10-CM

## 2019-07-24 DIAGNOSIS — I10 ESSENTIAL HYPERTENSION: ICD-10-CM

## 2019-07-24 DIAGNOSIS — F32.A DEPRESSION, UNSPECIFIED DEPRESSION TYPE: ICD-10-CM

## 2019-07-24 DIAGNOSIS — J44.9 CHRONIC OBSTRUCTIVE PULMONARY DISEASE, UNSPECIFIED COPD TYPE (HCC): ICD-10-CM

## 2019-07-24 PROCEDURE — 1090F PRES/ABSN URINE INCON ASSESS: CPT | Performed by: FAMILY MEDICINE

## 2019-07-24 PROCEDURE — 3017F COLORECTAL CA SCREEN DOC REV: CPT | Performed by: FAMILY MEDICINE

## 2019-07-24 PROCEDURE — G8926 SPIRO NO PERF OR DOC: HCPCS | Performed by: FAMILY MEDICINE

## 2019-07-24 PROCEDURE — G8427 DOCREV CUR MEDS BY ELIG CLIN: HCPCS | Performed by: FAMILY MEDICINE

## 2019-07-24 PROCEDURE — 3023F SPIROM DOC REV: CPT | Performed by: FAMILY MEDICINE

## 2019-07-24 PROCEDURE — 1036F TOBACCO NON-USER: CPT | Performed by: FAMILY MEDICINE

## 2019-07-24 PROCEDURE — 1123F ACP DISCUSS/DSCN MKR DOCD: CPT | Performed by: FAMILY MEDICINE

## 2019-07-24 PROCEDURE — G8417 CALC BMI ABV UP PARAM F/U: HCPCS | Performed by: FAMILY MEDICINE

## 2019-07-24 PROCEDURE — 99214 OFFICE O/P EST MOD 30 MIN: CPT | Performed by: FAMILY MEDICINE

## 2019-07-24 PROCEDURE — 4040F PNEUMOC VAC/ADMIN/RCVD: CPT | Performed by: FAMILY MEDICINE

## 2019-07-24 PROCEDURE — G8399 PT W/DXA RESULTS DOCUMENT: HCPCS | Performed by: FAMILY MEDICINE

## 2019-07-24 RX ORDER — AMLODIPINE BESYLATE 5 MG/1
5 TABLET ORAL DAILY
Qty: 30 TABLET | Refills: 5 | Status: SHIPPED | OUTPATIENT
Start: 2019-07-24 | End: 2019-12-19 | Stop reason: SDUPTHER

## 2019-07-24 RX ORDER — HYDROCORTISONE ACETATE 25 MG/1
25 SUPPOSITORY RECTAL 4 TIMES DAILY PRN
Qty: 30 SUPPOSITORY | Refills: 1 | Status: SHIPPED | OUTPATIENT
Start: 2019-07-24 | End: 2019-08-02

## 2019-07-24 NOTE — PROGRESS NOTES
SUBJECTIVE:    Patient ID: Sherley Dunham is a 69 y.o. female. Chief Complaint   Patient presents with    Rectal Bleeding     x 1 week       HPI:she is here today in follow-up of her significant cough. Is feeling better. She did take some significant amount of steroid and antibiotic. She says now she is having some bright red rectal bleeding. She says is been going on for a few days. She says she is not having any pain at all. She says she is not had any obvious external hemorrhoids. She has not had any significant abdominal cramping. She does not have any history of any positive colonoscopy. She says she is not noticed it every day but it does seem to come and go. She does have some relatively loose stools. She says that relatively normal for her as well. She says that it is more formed at times but she is never constipated. She is not having any obvious medication problems at this time. She does feel like that the cough has relatively resolved. She denies any fevers chills nausea vomiting or other symptoms. Pressures have been doing well at home. She has not had any significant increase in shortness of breath. She denies any chest pain. She says her depression medicine does seem to be helping. Review of Systems   Musculoskeletal: Positive for gait problem. Psychiatric/Behavioral: Positive for agitation. The patient is nervous/anxious. All other systems reviewed and are negative. OBJECTIVE:  Wt Readings from Last 3 Encounters:   07/24/19 195 lb (88.5 kg)   07/02/19 197 lb (89.4 kg)   06/20/19 198 lb (89.8 kg)     BP Readings from Last 3 Encounters:   07/24/19 126/78   07/02/19 118/68   06/20/19 110/64      Pulse Readings from Last 3 Encounters:   07/24/19 102   07/02/19 82   06/20/19 86     Body mass index is 36.84 kg/m².      Resp Readings from Last 3 Encounters:   07/24/19 18   07/02/19 18   06/20/19 18     Physical Exam   Constitutional: She is oriented to person, place, and

## 2019-08-02 ENCOUNTER — OFFICE VISIT (OUTPATIENT)
Dept: FAMILY MEDICINE CLINIC | Age: 74
End: 2019-08-02
Payer: MEDICARE

## 2019-08-02 ENCOUNTER — HOSPITAL ENCOUNTER (OUTPATIENT)
Facility: HOSPITAL | Age: 74
Discharge: HOME | End: 2019-08-02
Payer: MEDICARE

## 2019-08-02 ENCOUNTER — TELEPHONE (OUTPATIENT)
Dept: FAMILY MEDICINE CLINIC | Age: 74
End: 2019-08-02

## 2019-08-02 VITALS
OXYGEN SATURATION: 98 % | RESPIRATION RATE: 20 BRPM | HEIGHT: 61 IN | BODY MASS INDEX: 36.85 KG/M2 | WEIGHT: 195.2 LBS | HEART RATE: 73 BPM

## 2019-08-02 DIAGNOSIS — I10 ESSENTIAL HYPERTENSION: ICD-10-CM

## 2019-08-02 DIAGNOSIS — R53.83 FATIGUE, UNSPECIFIED TYPE: ICD-10-CM

## 2019-08-02 DIAGNOSIS — F32.A DEPRESSION, UNSPECIFIED DEPRESSION TYPE: ICD-10-CM

## 2019-08-02 DIAGNOSIS — J44.9 CHRONIC OBSTRUCTIVE PULMONARY DISEASE, UNSPECIFIED COPD TYPE (HCC): ICD-10-CM

## 2019-08-02 DIAGNOSIS — E78.9 LIPID DISORDER: ICD-10-CM

## 2019-08-02 DIAGNOSIS — K62.5 BRIGHT RED RECTAL BLEEDING: Primary | ICD-10-CM

## 2019-08-02 DIAGNOSIS — M05.9 RHEUMATOID ARTHRITIS WITH POSITIVE RHEUMATOID FACTOR, INVOLVING UNSPECIFIED SITE (HCC): ICD-10-CM

## 2019-08-02 LAB
A/G RATIO: 1.8 (ref 0.8–2)
ALBUMIN SERPL-MCNC: 4.5 G/DL (ref 3.4–4.8)
ALP BLD-CCNC: 69 U/L (ref 25–100)
ALT SERPL-CCNC: 11 U/L (ref 4–36)
ANION GAP SERPL CALCULATED.3IONS-SCNC: 13 MMOL/L (ref 3–16)
AST SERPL-CCNC: 15 U/L (ref 8–33)
BILIRUB SERPL-MCNC: 0.4 MG/DL (ref 0.3–1.2)
BUN BLDV-MCNC: 19 MG/DL (ref 6–20)
CALCIUM SERPL-MCNC: 10.8 MG/DL (ref 8.5–10.5)
CHLORIDE BLD-SCNC: 98 MMOL/L (ref 98–107)
CHOLESTEROL, TOTAL: 183 MG/DL (ref 0–200)
CO2: 27 MMOL/L (ref 20–30)
CREAT SERPL-MCNC: 0.8 MG/DL (ref 0.4–1.2)
FOLATE: 12.9 NG/ML
GFR AFRICAN AMERICAN: >59
GFR NON-AFRICAN AMERICAN: >60
GLOBULIN: 2.5 G/DL
GLUCOSE BLD-MCNC: 117 MG/DL (ref 74–106)
HCT VFR BLD CALC: 39.9 % (ref 37–47)
HDLC SERPL-MCNC: 63 MG/DL (ref 40–60)
HEMOGLOBIN: 13.1 G/DL (ref 11.5–16.5)
LDL CHOLESTEROL CALCULATED: 93 MG/DL
MCH RBC QN AUTO: 30.5 PG (ref 27–32)
MCHC RBC AUTO-ENTMCNC: 32.8 G/DL (ref 31–35)
MCV RBC AUTO: 93 FL (ref 80–100)
PDW BLD-RTO: 13.4 % (ref 11–16)
PLATELET # BLD: 227 K/UL (ref 150–400)
PMV BLD AUTO: 12 FL (ref 6–10)
POTASSIUM SERPL-SCNC: 4.2 MMOL/L (ref 3.4–5.1)
RBC # BLD: 4.29 M/UL (ref 3.8–5.8)
SODIUM BLD-SCNC: 138 MMOL/L (ref 136–145)
TOTAL PROTEIN: 7 G/DL (ref 6.4–8.3)
TRIGL SERPL-MCNC: 133 MG/DL (ref 0–249)
TSH SERPL DL<=0.05 MIU/L-ACNC: 4.16 UIU/ML (ref 0.35–5.5)
VITAMIN B-12: 1046 PG/ML (ref 211–911)
VLDLC SERPL CALC-MCNC: 27 MG/DL
WBC # BLD: 5.2 K/UL (ref 4–11)

## 2019-08-02 PROCEDURE — 85027 COMPLETE CBC AUTOMATED: CPT

## 2019-08-02 PROCEDURE — 36415 COLL VENOUS BLD VENIPUNCTURE: CPT

## 2019-08-02 PROCEDURE — 3023F SPIROM DOC REV: CPT | Performed by: FAMILY MEDICINE

## 2019-08-02 PROCEDURE — 1036F TOBACCO NON-USER: CPT | Performed by: FAMILY MEDICINE

## 2019-08-02 PROCEDURE — 1123F ACP DISCUSS/DSCN MKR DOCD: CPT | Performed by: FAMILY MEDICINE

## 2019-08-02 PROCEDURE — 3017F COLORECTAL CA SCREEN DOC REV: CPT | Performed by: FAMILY MEDICINE

## 2019-08-02 PROCEDURE — 82746 ASSAY OF FOLIC ACID SERUM: CPT

## 2019-08-02 PROCEDURE — G8417 CALC BMI ABV UP PARAM F/U: HCPCS | Performed by: FAMILY MEDICINE

## 2019-08-02 PROCEDURE — 99213 OFFICE O/P EST LOW 20 MIN: CPT | Performed by: FAMILY MEDICINE

## 2019-08-02 PROCEDURE — G8926 SPIRO NO PERF OR DOC: HCPCS | Performed by: FAMILY MEDICINE

## 2019-08-02 PROCEDURE — 84443 ASSAY THYROID STIM HORMONE: CPT

## 2019-08-02 PROCEDURE — G8427 DOCREV CUR MEDS BY ELIG CLIN: HCPCS | Performed by: FAMILY MEDICINE

## 2019-08-02 PROCEDURE — 4040F PNEUMOC VAC/ADMIN/RCVD: CPT | Performed by: FAMILY MEDICINE

## 2019-08-02 PROCEDURE — 80053 COMPREHEN METABOLIC PANEL: CPT

## 2019-08-02 PROCEDURE — 80061 LIPID PANEL: CPT

## 2019-08-02 PROCEDURE — 1090F PRES/ABSN URINE INCON ASSESS: CPT | Performed by: FAMILY MEDICINE

## 2019-08-02 PROCEDURE — 82607 VITAMIN B-12: CPT

## 2019-08-02 PROCEDURE — G8399 PT W/DXA RESULTS DOCUMENT: HCPCS | Performed by: FAMILY MEDICINE

## 2019-08-02 RX ORDER — ROPINIROLE 2 MG/1
2 TABLET, FILM COATED ORAL 3 TIMES DAILY
Qty: 90 TABLET | Refills: 3 | Status: SHIPPED | OUTPATIENT
Start: 2019-08-02 | End: 2019-12-19

## 2019-09-18 ENCOUNTER — OFFICE VISIT (OUTPATIENT)
Dept: FAMILY MEDICINE CLINIC | Age: 74
End: 2019-09-18
Payer: MEDICARE

## 2019-09-18 VITALS
HEIGHT: 61 IN | DIASTOLIC BLOOD PRESSURE: 78 MMHG | RESPIRATION RATE: 20 BRPM | HEART RATE: 78 BPM | OXYGEN SATURATION: 98 % | BODY MASS INDEX: 37.42 KG/M2 | WEIGHT: 198.2 LBS | SYSTOLIC BLOOD PRESSURE: 122 MMHG

## 2019-09-18 DIAGNOSIS — J18.9 PNEUMONIA OF BOTH LOWER LOBES DUE TO INFECTIOUS ORGANISM: ICD-10-CM

## 2019-09-18 DIAGNOSIS — F32.A DEPRESSION, UNSPECIFIED DEPRESSION TYPE: ICD-10-CM

## 2019-09-18 PROCEDURE — G8417 CALC BMI ABV UP PARAM F/U: HCPCS | Performed by: FAMILY MEDICINE

## 2019-09-18 PROCEDURE — 1123F ACP DISCUSS/DSCN MKR DOCD: CPT | Performed by: FAMILY MEDICINE

## 2019-09-18 PROCEDURE — G8399 PT W/DXA RESULTS DOCUMENT: HCPCS | Performed by: FAMILY MEDICINE

## 2019-09-18 PROCEDURE — 1090F PRES/ABSN URINE INCON ASSESS: CPT | Performed by: FAMILY MEDICINE

## 2019-09-18 PROCEDURE — 1036F TOBACCO NON-USER: CPT | Performed by: FAMILY MEDICINE

## 2019-09-18 PROCEDURE — 99214 OFFICE O/P EST MOD 30 MIN: CPT | Performed by: FAMILY MEDICINE

## 2019-09-18 PROCEDURE — 3017F COLORECTAL CA SCREEN DOC REV: CPT | Performed by: FAMILY MEDICINE

## 2019-09-18 PROCEDURE — G8427 DOCREV CUR MEDS BY ELIG CLIN: HCPCS | Performed by: FAMILY MEDICINE

## 2019-09-18 PROCEDURE — 4040F PNEUMOC VAC/ADMIN/RCVD: CPT | Performed by: FAMILY MEDICINE

## 2019-09-18 RX ORDER — FLUTICASONE PROPIONATE 220 UG/1
2 AEROSOL, METERED RESPIRATORY (INHALATION) 2 TIMES DAILY
Qty: 3 INHALER | Refills: 3 | Status: SHIPPED | OUTPATIENT
Start: 2019-09-18 | End: 2020-03-23 | Stop reason: SDUPTHER

## 2019-09-18 RX ORDER — IPRATROPIUM BROMIDE AND ALBUTEROL SULFATE 2.5; .5 MG/3ML; MG/3ML
1 SOLUTION RESPIRATORY (INHALATION) 4 TIMES DAILY
Qty: 360 ML | Refills: 2 | Status: SHIPPED | OUTPATIENT
Start: 2019-09-18 | End: 2020-02-04 | Stop reason: ALTCHOICE

## 2019-09-18 RX ORDER — MONTELUKAST SODIUM 10 MG/1
TABLET ORAL
Qty: 90 TABLET | Refills: 3 | Status: SHIPPED | OUTPATIENT
Start: 2019-09-18 | End: 2020-03-23 | Stop reason: SDUPTHER

## 2019-09-18 RX ORDER — CARVEDILOL 25 MG/1
25 TABLET ORAL 2 TIMES DAILY
Qty: 180 TABLET | Refills: 3 | Status: SHIPPED | OUTPATIENT
Start: 2019-09-18 | End: 2020-03-23 | Stop reason: SDUPTHER

## 2019-10-30 ENCOUNTER — NURSE ONLY (OUTPATIENT)
Dept: FAMILY MEDICINE CLINIC | Age: 74
End: 2019-10-30

## 2019-10-30 DIAGNOSIS — Z23 FLU VACCINE NEED: Primary | ICD-10-CM

## 2019-10-30 PROCEDURE — 90688 IIV4 VACCINE SPLT 0.5 ML IM: CPT | Performed by: FAMILY MEDICINE

## 2019-10-30 PROCEDURE — G0008 ADMIN INFLUENZA VIRUS VAC: HCPCS | Performed by: FAMILY MEDICINE

## 2019-12-19 ENCOUNTER — OFFICE VISIT (OUTPATIENT)
Dept: FAMILY MEDICINE CLINIC | Age: 74
End: 2019-12-19
Payer: MEDICARE

## 2019-12-19 VITALS
WEIGHT: 204 LBS | HEIGHT: 61 IN | SYSTOLIC BLOOD PRESSURE: 124 MMHG | BODY MASS INDEX: 38.51 KG/M2 | DIASTOLIC BLOOD PRESSURE: 62 MMHG | HEART RATE: 60 BPM | OXYGEN SATURATION: 94 % | RESPIRATION RATE: 20 BRPM

## 2019-12-19 DIAGNOSIS — F32.A DEPRESSION, UNSPECIFIED DEPRESSION TYPE: Primary | ICD-10-CM

## 2019-12-19 DIAGNOSIS — R41.3 MEMORY LOSS: ICD-10-CM

## 2019-12-19 DIAGNOSIS — G25.81 RESTLESS LEG: ICD-10-CM

## 2019-12-19 DIAGNOSIS — M05.9 RHEUMATOID ARTHRITIS WITH POSITIVE RHEUMATOID FACTOR, INVOLVING UNSPECIFIED SITE (HCC): ICD-10-CM

## 2019-12-19 DIAGNOSIS — I10 ESSENTIAL HYPERTENSION: ICD-10-CM

## 2019-12-19 DIAGNOSIS — R32 URINARY INCONTINENCE, UNSPECIFIED TYPE: ICD-10-CM

## 2019-12-19 DIAGNOSIS — R05.9 COUGH: ICD-10-CM

## 2019-12-19 DIAGNOSIS — J44.9 CHRONIC OBSTRUCTIVE PULMONARY DISEASE, UNSPECIFIED COPD TYPE (HCC): ICD-10-CM

## 2019-12-19 PROCEDURE — 0509F URINE INCON PLAN DOCD: CPT | Performed by: FAMILY MEDICINE

## 2019-12-19 PROCEDURE — G8427 DOCREV CUR MEDS BY ELIG CLIN: HCPCS | Performed by: FAMILY MEDICINE

## 2019-12-19 PROCEDURE — 99214 OFFICE O/P EST MOD 30 MIN: CPT | Performed by: FAMILY MEDICINE

## 2019-12-19 PROCEDURE — G8417 CALC BMI ABV UP PARAM F/U: HCPCS | Performed by: FAMILY MEDICINE

## 2019-12-19 PROCEDURE — 1090F PRES/ABSN URINE INCON ASSESS: CPT | Performed by: FAMILY MEDICINE

## 2019-12-19 PROCEDURE — G8399 PT W/DXA RESULTS DOCUMENT: HCPCS | Performed by: FAMILY MEDICINE

## 2019-12-19 PROCEDURE — G8482 FLU IMMUNIZE ORDER/ADMIN: HCPCS | Performed by: FAMILY MEDICINE

## 2019-12-19 PROCEDURE — 3017F COLORECTAL CA SCREEN DOC REV: CPT | Performed by: FAMILY MEDICINE

## 2019-12-19 PROCEDURE — G8926 SPIRO NO PERF OR DOC: HCPCS | Performed by: FAMILY MEDICINE

## 2019-12-19 PROCEDURE — 1036F TOBACCO NON-USER: CPT | Performed by: FAMILY MEDICINE

## 2019-12-19 PROCEDURE — 4040F PNEUMOC VAC/ADMIN/RCVD: CPT | Performed by: FAMILY MEDICINE

## 2019-12-19 PROCEDURE — 1123F ACP DISCUSS/DSCN MKR DOCD: CPT | Performed by: FAMILY MEDICINE

## 2019-12-19 PROCEDURE — 3023F SPIROM DOC REV: CPT | Performed by: FAMILY MEDICINE

## 2019-12-19 RX ORDER — AMLODIPINE BESYLATE 5 MG/1
5 TABLET ORAL DAILY
Qty: 90 TABLET | Refills: 3 | Status: SHIPPED | OUTPATIENT
Start: 2019-12-19 | End: 2020-08-06 | Stop reason: SDUPTHER

## 2019-12-19 RX ORDER — DONEPEZIL HYDROCHLORIDE 10 MG/1
10 TABLET, FILM COATED ORAL NIGHTLY
Qty: 90 TABLET | Refills: 1 | Status: SHIPPED | OUTPATIENT
Start: 2019-12-19 | End: 2020-01-08 | Stop reason: SINTOL

## 2019-12-19 RX ORDER — SIMETHICONE 80 MG
80 TABLET,CHEWABLE ORAL 4 TIMES DAILY PRN
Qty: 180 TABLET | Refills: 3 | Status: SHIPPED | OUTPATIENT
Start: 2019-12-19 | End: 2020-06-30

## 2019-12-19 RX ORDER — ROPINIROLE 3 MG/1
3 TABLET, FILM COATED ORAL 3 TIMES DAILY
Qty: 270 TABLET | Refills: 3 | Status: SHIPPED | OUTPATIENT
Start: 2019-12-19 | End: 2020-02-04 | Stop reason: SDUPTHER

## 2020-01-08 ENCOUNTER — OFFICE VISIT (OUTPATIENT)
Dept: FAMILY MEDICINE CLINIC | Age: 75
End: 2020-01-08
Payer: MEDICARE

## 2020-01-08 VITALS
SYSTOLIC BLOOD PRESSURE: 138 MMHG | HEIGHT: 61 IN | OXYGEN SATURATION: 94 % | BODY MASS INDEX: 39.08 KG/M2 | WEIGHT: 207 LBS | DIASTOLIC BLOOD PRESSURE: 80 MMHG | HEART RATE: 76 BPM | RESPIRATION RATE: 20 BRPM

## 2020-01-08 PROCEDURE — G8926 SPIRO NO PERF OR DOC: HCPCS | Performed by: FAMILY MEDICINE

## 2020-01-08 PROCEDURE — 96372 THER/PROPH/DIAG INJ SC/IM: CPT | Performed by: FAMILY MEDICINE

## 2020-01-08 PROCEDURE — 1123F ACP DISCUSS/DSCN MKR DOCD: CPT | Performed by: FAMILY MEDICINE

## 2020-01-08 PROCEDURE — 3017F COLORECTAL CA SCREEN DOC REV: CPT | Performed by: FAMILY MEDICINE

## 2020-01-08 PROCEDURE — 99213 OFFICE O/P EST LOW 20 MIN: CPT | Performed by: FAMILY MEDICINE

## 2020-01-08 PROCEDURE — 4040F PNEUMOC VAC/ADMIN/RCVD: CPT | Performed by: FAMILY MEDICINE

## 2020-01-08 PROCEDURE — G8427 DOCREV CUR MEDS BY ELIG CLIN: HCPCS | Performed by: FAMILY MEDICINE

## 2020-01-08 PROCEDURE — G8482 FLU IMMUNIZE ORDER/ADMIN: HCPCS | Performed by: FAMILY MEDICINE

## 2020-01-08 PROCEDURE — 1036F TOBACCO NON-USER: CPT | Performed by: FAMILY MEDICINE

## 2020-01-08 PROCEDURE — 1090F PRES/ABSN URINE INCON ASSESS: CPT | Performed by: FAMILY MEDICINE

## 2020-01-08 PROCEDURE — 3023F SPIROM DOC REV: CPT | Performed by: FAMILY MEDICINE

## 2020-01-08 PROCEDURE — G8399 PT W/DXA RESULTS DOCUMENT: HCPCS | Performed by: FAMILY MEDICINE

## 2020-01-08 PROCEDURE — G8417 CALC BMI ABV UP PARAM F/U: HCPCS | Performed by: FAMILY MEDICINE

## 2020-01-08 RX ORDER — PREDNISONE 10 MG/1
TABLET ORAL
Qty: 48 EACH | Refills: 0 | Status: SHIPPED | OUTPATIENT
Start: 2020-01-08 | End: 2020-01-10 | Stop reason: SDUPTHER

## 2020-01-08 RX ORDER — METHYLPREDNISOLONE SODIUM SUCCINATE 125 MG/2ML
125 INJECTION, POWDER, LYOPHILIZED, FOR SOLUTION INTRAMUSCULAR; INTRAVENOUS ONCE
Status: COMPLETED | OUTPATIENT
Start: 2020-01-08 | End: 2020-01-08

## 2020-01-08 RX ORDER — PREDNISONE 10 MG/1
TABLET ORAL
Qty: 48 TABLET | Refills: 0 | Status: SHIPPED | OUTPATIENT
Start: 2020-01-08 | End: 2020-01-08 | Stop reason: ALTCHOICE

## 2020-01-08 RX ADMIN — METHYLPREDNISOLONE SODIUM SUCCINATE 125 MG: 125 INJECTION, POWDER, LYOPHILIZED, FOR SOLUTION INTRAMUSCULAR; INTRAVENOUS at 11:19

## 2020-01-08 ASSESSMENT — ENCOUNTER SYMPTOMS
RHINORRHEA: 1
COUGH: 1
CHEST TIGHTNESS: 1
WHEEZING: 1
SHORTNESS OF BREATH: 1

## 2020-01-08 ASSESSMENT — PATIENT HEALTH QUESTIONNAIRE - PHQ9
1. LITTLE INTEREST OR PLEASURE IN DOING THINGS: 0
SUM OF ALL RESPONSES TO PHQ9 QUESTIONS 1 & 2: 1
SUM OF ALL RESPONSES TO PHQ QUESTIONS 1-9: 1
2. FEELING DOWN, DEPRESSED OR HOPELESS: 1
SUM OF ALL RESPONSES TO PHQ QUESTIONS 1-9: 1

## 2020-01-08 NOTE — PROGRESS NOTES
Exam  Vitals signs and nursing note reviewed. Constitutional:       Appearance: She is well-developed. She is ill-appearing. HENT:      Head: Normocephalic. Eyes:      General:         Right eye: Discharge present. Conjunctiva/sclera:      Right eye: Right conjunctiva is injected. Pupils: Pupils are equal, round, and reactive to light. Comments: Right lower lid does seem to be inverting. Her eyelashes are actually rubbing her eye. He does appear to be the main cause of her irritation. Neck:      Musculoskeletal: Normal range of motion and neck supple. Cardiovascular:      Rate and Rhythm: Normal rate and regular rhythm. Pulmonary:      Effort: Pulmonary effort is normal.      Breath sounds: Examination of the right-lower field reveals decreased breath sounds, wheezing and rhonchi. Examination of the left-lower field reveals decreased breath sounds, wheezing and rhonchi. Decreased breath sounds, wheezing and rhonchi present. Musculoskeletal:      Right knee: She exhibits decreased range of motion. Tenderness found. Medial joint line and lateral joint line tenderness noted. Left knee: She exhibits decreased range of motion. Tenderness found. Medial joint line and lateral joint line tenderness noted. Lumbar back: She exhibits decreased range of motion, tenderness, pain and spasm. Skin:     General: Skin is warm and dry. Neurological:      Mental Status: She is alert and oriented to person, place, and time. Psychiatric:         Mood and Affect: Mood is anxious and depressed. Behavior: Behavior is agitated. No results found for requested labs within last 30 days.        LDL Calculated (mg/dL)   Date Value   08/02/2019 93         Lab Results   Component Value Date    WBC 5.2 08/02/2019    NEUTROABS 4.3 07/17/2017    HGB 13.1 08/02/2019    HCT 39.9 08/02/2019    MCV 93.0 08/02/2019     08/02/2019       Lab Results   Component Value Date    TSH 4.16 08/02/2019         ASSESSMENT:    Diagnosis Orders   1. Chronic obstructive pulmonary disease with acute exacerbation ([facility] [address]     2. Memory loss     3.  Depression, unspecified depression type          PLAN:  Orders Placed This Encounter   Medications    DISCONTD: predniSONE (DELTASONE) 10 MG tablet     Sig: Use as directed, with food     Dispense:  48 tablet     Refill:  0    methylPREDNISolone sodium (SOLU-MEDROL) injection 125 mg    predniSONE 10 MG (48) TBPK     Sig: Take as Directed     Dispense:  48 each     Refill:  0        Medications Discontinued During This Encounter   Medication Reason    donepezil (ARICEPT) 10 MG tablet Side effects    predniSONE (DELTASONE) 10 MG tablet Alternate therapy       Controlled Substances Monitoring:

## 2020-01-08 NOTE — PROGRESS NOTES
Have you seen any other physician or provider since your last visit no    Have you had any other diagnostic tests since your last visit? no    Have you changed or stopped any medications since your last visit? yes - Stopped Aricept due to side effects     I have recommended that this patient have a mammogram but she declines at this time. I have discussed the risks and benefits of this examination with her. The patient verbalizes understanding. Administrations This Visit     methylPREDNISolone sodium (SOLU-MEDROL) injection 125 mg     Admin Date  01/08/2020  11:19 Action  Given Dose  125 mg Route  Intramuscular Site  Dorsogluteal Left Administered By  Shabana Rivas RN    Ordering Provider:  Sanna Hicks MD    NDC:  3864-9006-92    Lot#:  [id number]    :  [place].     Patient Supplied?:  No

## 2020-01-10 ENCOUNTER — OFFICE VISIT (OUTPATIENT)
Dept: FAMILY MEDICINE CLINIC | Age: 75
End: 2020-01-10
Payer: MEDICARE

## 2020-01-10 VITALS
DIASTOLIC BLOOD PRESSURE: 64 MMHG | RESPIRATION RATE: 24 BRPM | HEART RATE: 84 BPM | SYSTOLIC BLOOD PRESSURE: 110 MMHG | OXYGEN SATURATION: 95 %

## 2020-01-10 PROCEDURE — G8926 SPIRO NO PERF OR DOC: HCPCS | Performed by: FAMILY MEDICINE

## 2020-01-10 PROCEDURE — 99213 OFFICE O/P EST LOW 20 MIN: CPT | Performed by: FAMILY MEDICINE

## 2020-01-10 PROCEDURE — G8417 CALC BMI ABV UP PARAM F/U: HCPCS | Performed by: FAMILY MEDICINE

## 2020-01-10 PROCEDURE — G8399 PT W/DXA RESULTS DOCUMENT: HCPCS | Performed by: FAMILY MEDICINE

## 2020-01-10 PROCEDURE — 4040F PNEUMOC VAC/ADMIN/RCVD: CPT | Performed by: FAMILY MEDICINE

## 2020-01-10 PROCEDURE — 96372 THER/PROPH/DIAG INJ SC/IM: CPT | Performed by: FAMILY MEDICINE

## 2020-01-10 PROCEDURE — 3017F COLORECTAL CA SCREEN DOC REV: CPT | Performed by: FAMILY MEDICINE

## 2020-01-10 PROCEDURE — 1123F ACP DISCUSS/DSCN MKR DOCD: CPT | Performed by: FAMILY MEDICINE

## 2020-01-10 PROCEDURE — G8427 DOCREV CUR MEDS BY ELIG CLIN: HCPCS | Performed by: FAMILY MEDICINE

## 2020-01-10 PROCEDURE — 3023F SPIROM DOC REV: CPT | Performed by: FAMILY MEDICINE

## 2020-01-10 PROCEDURE — 1036F TOBACCO NON-USER: CPT | Performed by: FAMILY MEDICINE

## 2020-01-10 PROCEDURE — 1090F PRES/ABSN URINE INCON ASSESS: CPT | Performed by: FAMILY MEDICINE

## 2020-01-10 PROCEDURE — G8482 FLU IMMUNIZE ORDER/ADMIN: HCPCS | Performed by: FAMILY MEDICINE

## 2020-01-10 RX ORDER — CEFTRIAXONE 1 G/1
1 INJECTION, POWDER, FOR SOLUTION INTRAMUSCULAR; INTRAVENOUS ONCE
Status: COMPLETED | OUTPATIENT
Start: 2020-01-10 | End: 2020-01-10

## 2020-01-10 RX ORDER — PREDNISONE 10 MG/1
TABLET ORAL
Qty: 48 EACH | Refills: 0 | Status: SHIPPED | OUTPATIENT
Start: 2020-01-10 | End: 2020-01-22 | Stop reason: ALTCHOICE

## 2020-01-10 RX ORDER — LEVOFLOXACIN 500 MG/1
500 TABLET, FILM COATED ORAL DAILY
Qty: 10 TABLET | Refills: 0 | Status: SHIPPED | OUTPATIENT
Start: 2020-01-10 | End: 2020-01-20

## 2020-01-10 RX ORDER — METHYLPREDNISOLONE SODIUM SUCCINATE 125 MG/2ML
125 INJECTION, POWDER, LYOPHILIZED, FOR SOLUTION INTRAMUSCULAR; INTRAVENOUS ONCE
Status: COMPLETED | OUTPATIENT
Start: 2020-01-10 | End: 2020-01-10

## 2020-01-10 RX ADMIN — CEFTRIAXONE 1 G: 1 INJECTION, POWDER, FOR SOLUTION INTRAMUSCULAR; INTRAVENOUS at 11:40

## 2020-01-10 RX ADMIN — METHYLPREDNISOLONE SODIUM SUCCINATE 125 MG: 125 INJECTION, POWDER, LYOPHILIZED, FOR SOLUTION INTRAMUSCULAR; INTRAVENOUS at 11:40

## 2020-01-10 ASSESSMENT — PATIENT HEALTH QUESTIONNAIRE - PHQ9
SUM OF ALL RESPONSES TO PHQ QUESTIONS 1-9: 0
SUM OF ALL RESPONSES TO PHQ9 QUESTIONS 1 & 2: 0
1. LITTLE INTEREST OR PLEASURE IN DOING THINGS: 0
SUM OF ALL RESPONSES TO PHQ QUESTIONS 1-9: 0
2. FEELING DOWN, DEPRESSED OR HOPELESS: 0

## 2020-01-10 NOTE — PROGRESS NOTES
Administrations This Visit     cefTRIAXone (ROCEPHIN) injection 1 g     Admin Date  01/10/2020  11:40 Action  Given Dose  1 g Route  Intramuscular Site  Dorsogluteal Left Administered By  Zoie Lopez RN    Ordering Provider:  Mila Jennings MD    NDC:  37668-048-18    Lot#:  [id number]    :  [address]    Patient Supplied?:  No          methylPREDNISolone sodium (SOLU-MEDROL) injection 125 mg     Admin Date  01/10/2020  11:40 Action  Given Dose  125 mg Route  Intramuscular Site  Dorsogluteal Right Administered By  Zoie Lopez RN    Ordering Provider:  Mila Jennings MD    NDC:  4944-7441-22    Lot#:  [id number]    :  [address] [facility].     Patient Supplied?:  No

## 2020-01-15 ASSESSMENT — ENCOUNTER SYMPTOMS
COUGH: 1
SHORTNESS OF BREATH: 1
CHEST TIGHTNESS: 1
WHEEZING: 1
RHINORRHEA: 1

## 2020-01-16 NOTE — PROGRESS NOTES
SUBJECTIVE:    Patient ID: Aldair Galeas is a 69 y.o. female. Chief Complaint   Patient presents with    Cough    Wheezing       HPI: Is here complaining of continued issues with wheezing. She is having give more shortness of breath. She is still having difficulty with the day-to-day breathing. She still having some difficulties on a regular basis. Not had any fever. Review of Systems   Constitutional: Positive for fatigue. HENT: Positive for rhinorrhea. Respiratory: Positive for cough, chest tightness, shortness of breath and wheezing. Musculoskeletal: Positive for gait problem. Psychiatric/Behavioral: Positive for agitation. The patient is nervous/anxious. All other systems reviewed and are negative. OBJECTIVE:  Wt Readings from Last 3 Encounters:   01/08/20 207 lb (93.9 kg)   12/19/19 204 lb (92.5 kg)   09/18/19 198 lb 3.2 oz (89.9 kg)     BP Readings from Last 3 Encounters:   01/10/20 110/64   01/08/20 138/80   12/19/19 124/62      Pulse Readings from Last 3 Encounters:   01/10/20 84   01/08/20 76   12/19/19 60     There is no height or weight on file to calculate BMI. Resp Readings from Last 3 Encounters:   01/10/20 24   01/08/20 20   12/19/19 20     Physical Exam  Vitals signs and nursing note reviewed. Constitutional:       Appearance: She is well-developed. She is ill-appearing. HENT:      Head: Normocephalic. Eyes:      General:         Right eye: Discharge present. Conjunctiva/sclera:      Right eye: Right conjunctiva is injected. Pupils: Pupils are equal, round, and reactive to light. Comments: Right lower lid does seem to be inverting. Her eyelashes are actually rubbing her eye. He does appear to be the main cause of her irritation. Neck:      Musculoskeletal: Normal range of motion and neck supple. Cardiovascular:      Rate and Rhythm: Normal rate and regular rhythm.    Pulmonary:      Effort: Pulmonary effort is normal.      Breath sounds:

## 2020-01-22 ENCOUNTER — OFFICE VISIT (OUTPATIENT)
Dept: FAMILY MEDICINE CLINIC | Age: 75
End: 2020-01-22
Payer: MEDICARE

## 2020-01-22 VITALS
DIASTOLIC BLOOD PRESSURE: 62 MMHG | RESPIRATION RATE: 20 BRPM | WEIGHT: 204 LBS | SYSTOLIC BLOOD PRESSURE: 144 MMHG | OXYGEN SATURATION: 98 % | BODY MASS INDEX: 38.51 KG/M2 | HEART RATE: 76 BPM | HEIGHT: 61 IN

## 2020-01-22 PROCEDURE — 1036F TOBACCO NON-USER: CPT | Performed by: FAMILY MEDICINE

## 2020-01-22 PROCEDURE — G8482 FLU IMMUNIZE ORDER/ADMIN: HCPCS | Performed by: FAMILY MEDICINE

## 2020-01-22 PROCEDURE — 3017F COLORECTAL CA SCREEN DOC REV: CPT | Performed by: FAMILY MEDICINE

## 2020-01-22 PROCEDURE — 1090F PRES/ABSN URINE INCON ASSESS: CPT | Performed by: FAMILY MEDICINE

## 2020-01-22 PROCEDURE — G8926 SPIRO NO PERF OR DOC: HCPCS | Performed by: FAMILY MEDICINE

## 2020-01-22 PROCEDURE — G8427 DOCREV CUR MEDS BY ELIG CLIN: HCPCS | Performed by: FAMILY MEDICINE

## 2020-01-22 PROCEDURE — 3023F SPIROM DOC REV: CPT | Performed by: FAMILY MEDICINE

## 2020-01-22 PROCEDURE — 1123F ACP DISCUSS/DSCN MKR DOCD: CPT | Performed by: FAMILY MEDICINE

## 2020-01-22 PROCEDURE — G8399 PT W/DXA RESULTS DOCUMENT: HCPCS | Performed by: FAMILY MEDICINE

## 2020-01-22 PROCEDURE — 99214 OFFICE O/P EST MOD 30 MIN: CPT | Performed by: FAMILY MEDICINE

## 2020-01-22 PROCEDURE — 4040F PNEUMOC VAC/ADMIN/RCVD: CPT | Performed by: FAMILY MEDICINE

## 2020-01-22 PROCEDURE — G8417 CALC BMI ABV UP PARAM F/U: HCPCS | Performed by: FAMILY MEDICINE

## 2020-01-22 RX ORDER — TRAZODONE HYDROCHLORIDE 50 MG/1
TABLET ORAL
Qty: 60 TABLET | Refills: 2 | Status: SHIPPED | OUTPATIENT
Start: 2020-01-22 | End: 2020-02-04 | Stop reason: SDUPTHER

## 2020-01-22 RX ORDER — BUPROPION HYDROCHLORIDE 100 MG/1
100 TABLET, EXTENDED RELEASE ORAL 2 TIMES DAILY
Qty: 60 TABLET | Refills: 3 | Status: SHIPPED | OUTPATIENT
Start: 2020-01-22 | End: 2020-02-04 | Stop reason: SDUPTHER

## 2020-01-22 RX ORDER — LORAZEPAM 0.5 MG/1
0.5 TABLET ORAL 2 TIMES DAILY PRN
Qty: 30 TABLET | Refills: 0 | Status: SHIPPED | OUTPATIENT
Start: 2020-01-22 | End: 2020-02-04 | Stop reason: SDUPTHER

## 2020-01-22 ASSESSMENT — PATIENT HEALTH QUESTIONNAIRE - PHQ9
SUM OF ALL RESPONSES TO PHQ QUESTIONS 1-9: 1
2. FEELING DOWN, DEPRESSED OR HOPELESS: 1
1. LITTLE INTEREST OR PLEASURE IN DOING THINGS: 0
SUM OF ALL RESPONSES TO PHQ QUESTIONS 1-9: 1
SUM OF ALL RESPONSES TO PHQ9 QUESTIONS 1 & 2: 1

## 2020-01-22 ASSESSMENT — ENCOUNTER SYMPTOMS
WHEEZING: 1
RHINORRHEA: 1
COUGH: 1
CHEST TIGHTNESS: 1
SHORTNESS OF BREATH: 1

## 2020-02-04 ENCOUNTER — OFFICE VISIT (OUTPATIENT)
Dept: FAMILY MEDICINE CLINIC | Age: 75
End: 2020-02-04
Payer: MEDICARE

## 2020-02-04 VITALS
BODY MASS INDEX: 38.51 KG/M2 | SYSTOLIC BLOOD PRESSURE: 132 MMHG | WEIGHT: 204 LBS | RESPIRATION RATE: 18 BRPM | HEIGHT: 61 IN | OXYGEN SATURATION: 96 % | DIASTOLIC BLOOD PRESSURE: 80 MMHG | HEART RATE: 70 BPM

## 2020-02-04 PROCEDURE — 4040F PNEUMOC VAC/ADMIN/RCVD: CPT | Performed by: FAMILY MEDICINE

## 2020-02-04 PROCEDURE — 3017F COLORECTAL CA SCREEN DOC REV: CPT | Performed by: FAMILY MEDICINE

## 2020-02-04 PROCEDURE — 99214 OFFICE O/P EST MOD 30 MIN: CPT | Performed by: FAMILY MEDICINE

## 2020-02-04 PROCEDURE — G8482 FLU IMMUNIZE ORDER/ADMIN: HCPCS | Performed by: FAMILY MEDICINE

## 2020-02-04 PROCEDURE — 1123F ACP DISCUSS/DSCN MKR DOCD: CPT | Performed by: FAMILY MEDICINE

## 2020-02-04 PROCEDURE — G8417 CALC BMI ABV UP PARAM F/U: HCPCS | Performed by: FAMILY MEDICINE

## 2020-02-04 PROCEDURE — G8399 PT W/DXA RESULTS DOCUMENT: HCPCS | Performed by: FAMILY MEDICINE

## 2020-02-04 PROCEDURE — 1036F TOBACCO NON-USER: CPT | Performed by: FAMILY MEDICINE

## 2020-02-04 PROCEDURE — G8427 DOCREV CUR MEDS BY ELIG CLIN: HCPCS | Performed by: FAMILY MEDICINE

## 2020-02-04 PROCEDURE — 1090F PRES/ABSN URINE INCON ASSESS: CPT | Performed by: FAMILY MEDICINE

## 2020-02-04 RX ORDER — BUPROPION HYDROCHLORIDE 100 MG/1
100 TABLET, EXTENDED RELEASE ORAL 2 TIMES DAILY
Qty: 180 TABLET | Refills: 3 | Status: SHIPPED | OUTPATIENT
Start: 2020-02-04 | End: 2020-08-06 | Stop reason: SDUPTHER

## 2020-02-04 RX ORDER — LORAZEPAM 0.5 MG/1
0.5 TABLET ORAL 2 TIMES DAILY PRN
Qty: 180 TABLET | Refills: 0 | Status: SHIPPED | OUTPATIENT
Start: 2020-02-04 | End: 2020-03-23 | Stop reason: SDUPTHER

## 2020-02-04 RX ORDER — TRAZODONE HYDROCHLORIDE 50 MG/1
TABLET ORAL
Qty: 180 TABLET | Refills: 3 | Status: SHIPPED | OUTPATIENT
Start: 2020-02-04 | End: 2020-08-06 | Stop reason: SDUPTHER

## 2020-02-04 RX ORDER — ROPINIROLE 3 MG/1
3 TABLET, FILM COATED ORAL 4 TIMES DAILY
Qty: 360 TABLET | Refills: 3 | Status: SHIPPED | OUTPATIENT
Start: 2020-02-04 | End: 2020-03-23 | Stop reason: SDUPTHER

## 2020-02-04 RX ORDER — LORAZEPAM 0.5 MG/1
0.5 TABLET ORAL 2 TIMES DAILY PRN
Qty: 60 TABLET | Refills: 2 | Status: SHIPPED | OUTPATIENT
Start: 2020-02-04 | End: 2020-02-04 | Stop reason: SDUPTHER

## 2020-02-04 NOTE — PROGRESS NOTES
SUBJECTIVE:    Patient ID: Oscar Aranda is a 69 y.o. female. Chief Complaint   Patient presents with    Anxiety     2 week follow up       HPI: Better with her anxiety. She says she is not having quite as much depression. She does feel like her medications are doing better. She is not having quite as much coughing. She denies any significant shortness of breath today. She has not had any chest pain. Her blood pressures have been doing well at home. Does complain of worse restless leg symptoms particularly at night. Her  says that he thinks she is quite a bit better. They are not having any significant new symptoms. Review of Systems   Constitutional: Positive for fatigue. Musculoskeletal: Positive for gait problem. Psychiatric/Behavioral: Positive for agitation. The patient is nervous/anxious. All other systems reviewed and are negative. OBJECTIVE:  /80   Pulse 70   Resp 18   Ht 5' 1\" (1.549 m)   Wt 204 lb (92.5 kg)   SpO2 96% Comment: room air  Breastfeeding No   BMI 38.55 kg/m²    Wt Readings from Last 3 Encounters:   02/04/20 204 lb (92.5 kg)   01/22/20 204 lb (92.5 kg)   01/08/20 207 lb (93.9 kg)     BP Readings from Last 3 Encounters:   02/04/20 132/80   01/22/20 (!) 144/62   01/10/20 110/64      Pulse Readings from Last 3 Encounters:   02/04/20 70   01/22/20 76   01/10/20 84     Body mass index is 38.55 kg/m². Resp Readings from Last 3 Encounters:   02/04/20 18   01/22/20 20   01/10/20 24     Past medical, surgical, family and social history were reviewed and updated with the patient. Physical Exam  Vitals signs and nursing note reviewed. Constitutional:       Appearance: Normal appearance. She is well-developed. HENT:      Head: Normocephalic. Nose: Nose normal.   Eyes:      Conjunctiva/sclera:      Right eye: Right conjunctiva is injected. Comments: Right lower lid does seem to be inverting. Her eyelashes are actually rubbing her eye. He does appear to be the main cause of her irritation. Neck:      Musculoskeletal: Normal range of motion and neck supple. Cardiovascular:      Rate and Rhythm: Normal rate and regular rhythm. Pulmonary:      Effort: Pulmonary effort is normal.      Breath sounds: Normal breath sounds. Musculoskeletal:      Right knee: She exhibits decreased range of motion. Tenderness found. Medial joint line and lateral joint line tenderness noted. Left knee: She exhibits decreased range of motion. Tenderness found. Medial joint line and lateral joint line tenderness noted. Thoracic back: She exhibits decreased range of motion and tenderness. Lumbar back: She exhibits decreased range of motion, tenderness, pain and spasm. Skin:     General: Skin is warm and dry. Neurological:      Mental Status: She is alert and oriented to person, place, and time. Psychiatric:         Attention and Perception: Attention normal.         Mood and Affect: Mood normal.         Speech: Speech normal.         Behavior: Behavior is agitated. Thought Content: Thought content normal.         Cognition and Memory: Cognition normal.         Judgment: Judgment normal.          No results found for requested labs within last 30 days. LDL Calculated (mg/dL)   Date Value   08/02/2019 93       Lab Results   Component Value Date    WBC 5.2 08/02/2019    NEUTROABS 4.3 07/17/2017    HGB 13.1 08/02/2019    HCT 39.9 08/02/2019    MCV 93.0 08/02/2019     08/02/2019     Lab Results   Component Value Date    TSH 4.16 08/02/2019       ASSESSMENT:    Diagnosis Orders   1. Situational anxiety  LORazepam (ATIVAN) 0.5 MG tablet    DISCONTINUED: LORazepam (ATIVAN) 0.5 MG tablet   2. Restless leg     3. Essential hypertension     4.  Depression, unspecified depression type          PLAN:  Orders Placed This Encounter   Medications    rOPINIRole (REQUIP) 3 MG tablet     Sig: Take 1 tablet by mouth 4 times daily     Dispense:  360 tablet     Refill:  3    DISCONTD: LORazepam (ATIVAN) 0.5 MG tablet     Sig: Take 1 tablet by mouth 2 times daily as needed for Anxiety for up to 30 days. Dispense:  60 tablet     Refill:  2    buPROPion (WELLBUTRIN SR) 100 MG extended release tablet     Sig: Take 1 tablet by mouth 2 times daily     Dispense:  180 tablet     Refill:  3    traZODone (DESYREL) 50 MG tablet     Sig: Take one to two tablets as needed for sleep. Dispense:  180 tablet     Refill:  3    LORazepam (ATIVAN) 0.5 MG tablet     Sig: Take 1 tablet by mouth 2 times daily as needed for Anxiety for up to 30 days. Dispense:  180 tablet     Refill:  0        Medications Discontinued During This Encounter   Medication Reason    ipratropium-albuterol (DUONEB) 0.5-2.5 (3) MG/3ML SOLN nebulizer solution Therapy completed    rOPINIRole (REQUIP) 3 MG tablet REORDER    LORazepam (ATIVAN) 0.5 MG tablet REORDER    buPROPion (WELLBUTRIN SR) 100 MG extended release tablet REORDER    traZODone (DESYREL) 50 MG tablet REORDER    LORazepam (ATIVAN) 0.5 MG tablet REORDER       Controlled Substances Monitoring:      Please note: This chart was generated using Dragon dictation software. Although every effort was made to ensure the accuracy of this automated transcription, some errors in transcription may have occurred.

## 2020-03-23 ENCOUNTER — OFFICE VISIT (OUTPATIENT)
Dept: FAMILY MEDICINE CLINIC | Age: 75
End: 2020-03-23
Payer: MEDICARE

## 2020-03-23 VITALS
SYSTOLIC BLOOD PRESSURE: 134 MMHG | WEIGHT: 200.8 LBS | HEIGHT: 61 IN | OXYGEN SATURATION: 97 % | RESPIRATION RATE: 18 BRPM | BODY MASS INDEX: 37.91 KG/M2 | DIASTOLIC BLOOD PRESSURE: 74 MMHG | HEART RATE: 75 BPM

## 2020-03-23 PROCEDURE — G8427 DOCREV CUR MEDS BY ELIG CLIN: HCPCS | Performed by: FAMILY MEDICINE

## 2020-03-23 PROCEDURE — 1123F ACP DISCUSS/DSCN MKR DOCD: CPT | Performed by: FAMILY MEDICINE

## 2020-03-23 PROCEDURE — 1036F TOBACCO NON-USER: CPT | Performed by: FAMILY MEDICINE

## 2020-03-23 PROCEDURE — 99214 OFFICE O/P EST MOD 30 MIN: CPT | Performed by: FAMILY MEDICINE

## 2020-03-23 PROCEDURE — G8926 SPIRO NO PERF OR DOC: HCPCS | Performed by: FAMILY MEDICINE

## 2020-03-23 PROCEDURE — 3023F SPIROM DOC REV: CPT | Performed by: FAMILY MEDICINE

## 2020-03-23 PROCEDURE — G8417 CALC BMI ABV UP PARAM F/U: HCPCS | Performed by: FAMILY MEDICINE

## 2020-03-23 PROCEDURE — G8399 PT W/DXA RESULTS DOCUMENT: HCPCS | Performed by: FAMILY MEDICINE

## 2020-03-23 PROCEDURE — 1090F PRES/ABSN URINE INCON ASSESS: CPT | Performed by: FAMILY MEDICINE

## 2020-03-23 PROCEDURE — 3017F COLORECTAL CA SCREEN DOC REV: CPT | Performed by: FAMILY MEDICINE

## 2020-03-23 PROCEDURE — 4040F PNEUMOC VAC/ADMIN/RCVD: CPT | Performed by: FAMILY MEDICINE

## 2020-03-23 PROCEDURE — G8482 FLU IMMUNIZE ORDER/ADMIN: HCPCS | Performed by: FAMILY MEDICINE

## 2020-03-23 RX ORDER — CARVEDILOL 25 MG/1
25 TABLET ORAL 2 TIMES DAILY
Qty: 180 TABLET | Refills: 3 | Status: SHIPPED | OUTPATIENT
Start: 2020-03-23 | End: 2020-09-03 | Stop reason: SDUPTHER

## 2020-03-23 RX ORDER — LOSARTAN POTASSIUM 100 MG/1
100 TABLET ORAL DAILY
Qty: 30 TABLET | Refills: 3 | Status: SHIPPED | OUTPATIENT
Start: 2020-03-23 | End: 2020-06-22

## 2020-03-23 RX ORDER — LORAZEPAM 0.5 MG/1
0.5 TABLET ORAL 2 TIMES DAILY PRN
Qty: 180 TABLET | Refills: 0 | Status: SHIPPED | OUTPATIENT
Start: 2020-03-23 | End: 2020-04-22

## 2020-03-23 RX ORDER — ROPINIROLE 3 MG/1
3 TABLET, FILM COATED ORAL 4 TIMES DAILY
Qty: 360 TABLET | Refills: 3 | Status: SHIPPED | OUTPATIENT
Start: 2020-03-23 | End: 2020-09-03 | Stop reason: SDUPTHER

## 2020-03-23 RX ORDER — BUDESONIDE AND FORMOTEROL FUMARATE DIHYDRATE 160; 4.5 UG/1; UG/1
2 AEROSOL RESPIRATORY (INHALATION) 2 TIMES DAILY
Qty: 1 INHALER | Refills: 3 | Status: SHIPPED | OUTPATIENT
Start: 2020-03-23 | End: 2020-05-04

## 2020-03-23 RX ORDER — MONTELUKAST SODIUM 10 MG/1
TABLET ORAL
Qty: 90 TABLET | Refills: 3 | Status: SHIPPED | OUTPATIENT
Start: 2020-03-23 | End: 2020-09-03 | Stop reason: SDUPTHER

## 2020-03-23 RX ORDER — FLUTICASONE PROPIONATE 220 UG/1
2 AEROSOL, METERED RESPIRATORY (INHALATION) 2 TIMES DAILY
Qty: 3 INHALER | Refills: 3 | Status: SHIPPED
Start: 2020-03-23 | End: 2020-03-24

## 2020-03-23 NOTE — PROGRESS NOTES
and neck supple. Cardiovascular:      Rate and Rhythm: Normal rate and regular rhythm. Pulmonary:      Effort: Pulmonary effort is normal.      Breath sounds: Normal breath sounds. Musculoskeletal:      Right knee: She exhibits decreased range of motion. Tenderness found. Medial joint line and lateral joint line tenderness noted. Left knee: She exhibits decreased range of motion. Tenderness found. Medial joint line and lateral joint line tenderness noted. Thoracic back: She exhibits decreased range of motion and tenderness. Lumbar back: She exhibits decreased range of motion, tenderness, pain and spasm. Skin:     General: Skin is warm and dry. Neurological:      Mental Status: She is alert and oriented to person, place, and time. Psychiatric:         Attention and Perception: Attention normal.         Mood and Affect: Mood normal.         Speech: Speech normal.         Behavior: Behavior is agitated. Thought Content: Thought content normal.         Cognition and Memory: Cognition normal.         Judgment: Judgment normal.         No results found for requested labs within last 30 days. LDL Calculated (mg/dL)   Date Value   08/02/2019 93       Lab Results   Component Value Date    WBC 5.2 08/02/2019    NEUTROABS 4.3 07/17/2017    HGB 13.1 08/02/2019    HCT 39.9 08/02/2019    MCV 93.0 08/02/2019     08/02/2019     Lab Results   Component Value Date    TSH 4.16 08/02/2019       ASSESSMENT:    Diagnosis Orders   1. Essential hypertension     2. Situational anxiety  LORazepam (ATIVAN) 0.5 MG tablet   3. Chronic obstructive pulmonary disease with acute exacerbation (HCC)          PLAN:  Orders Placed This Encounter   Medications    LORazepam (ATIVAN) 0.5 MG tablet     Sig: Take 1 tablet by mouth 2 times daily as needed for Anxiety for up to 30 days.      Dispense:  180 tablet     Refill:  0    rOPINIRole (REQUIP) 3 MG tablet     Sig: Take 1 tablet by mouth 4 times daily Dispense:  360 tablet     Refill:  3    DISCONTD: fluticasone (FLOVENT HFA) 220 MCG/ACT inhaler     Sig: Inhale 2 puffs into the lungs 2 times daily     Dispense:  3 Inhaler     Refill:  3    montelukast (SINGULAIR) 10 MG tablet     Sig: TAKE ONE TABLET BY MOUTH ONCE NIGHTLY     Dispense:  90 tablet     Refill:  3    carvedilol (COREG) 25 MG tablet     Sig: Take 1 tablet by mouth 2 times daily     Dispense:  180 tablet     Refill:  3    diclofenac sodium (VOLTAREN) 1 % GEL     Sig: Apply 4 g topically 4 times daily     Dispense:  12 Tube     Refill:  3    losartan (COZAAR) 100 MG tablet     Sig: Take 1 tablet by mouth daily     Dispense:  30 tablet     Refill:  3    budesonide-formoterol (SYMBICORT) 160-4.5 MCG/ACT AERO     Sig: Inhale 2 puffs into the lungs 2 times daily     Dispense:  1 Inhaler     Refill:  3        Medications Discontinued During This Encounter   Medication Reason    LORazepam (ATIVAN) 0.5 MG tablet REORDER    rOPINIRole (REQUIP) 3 MG tablet REORDER    fluticasone (FLOVENT HFA) 220 MCG/ACT inhaler REORDER    montelukast (SINGULAIR) 10 MG tablet REORDER    carvedilol (COREG) 25 MG tablet REORDER       Controlled Substances Monitoring:      Please note: This chart was generated using Dragon dictation software. Although every effort was made to ensure the accuracy of this automated transcription, some errors in transcription may have occurred.

## 2020-04-22 RX ORDER — VENLAFAXINE 75 MG/1
TABLET ORAL
Qty: 180 TABLET | Refills: 2 | OUTPATIENT
Start: 2020-04-22

## 2020-05-04 ENCOUNTER — TELEPHONE (OUTPATIENT)
Dept: FAMILY MEDICINE CLINIC | Age: 75
End: 2020-05-04

## 2020-05-04 ENCOUNTER — VIRTUAL VISIT (OUTPATIENT)
Dept: FAMILY MEDICINE CLINIC | Age: 75
End: 2020-05-04
Payer: MEDICARE

## 2020-05-04 VITALS
DIASTOLIC BLOOD PRESSURE: 86 MMHG | TEMPERATURE: 97 F | BODY MASS INDEX: 36.92 KG/M2 | SYSTOLIC BLOOD PRESSURE: 136 MMHG | HEART RATE: 84 BPM | OXYGEN SATURATION: 96 % | WEIGHT: 195.4 LBS

## 2020-05-04 PROCEDURE — G2025 DIS SITE TELE SVCS RHC/FQHC: HCPCS | Performed by: FAMILY MEDICINE

## 2020-05-04 RX ORDER — LORAZEPAM 0.5 MG/1
0.5 TABLET ORAL 2 TIMES DAILY PRN
Qty: 180 TABLET | Refills: 0 | Status: SHIPPED | OUTPATIENT
Start: 2020-05-04 | End: 2020-08-06 | Stop reason: SDUPTHER

## 2020-05-04 RX ORDER — PREDNISONE 10 MG/1
TABLET ORAL
Qty: 48 TABLET | Refills: 0 | Status: SHIPPED | OUTPATIENT
Start: 2020-05-04 | End: 2020-05-14

## 2020-05-04 NOTE — PROGRESS NOTES
Component Value Date    TSH 4.16 08/02/2019       ASSESSMENT:    Diagnosis Orders   1. Pain of left lower extremity     2. Rheumatoid arthritis with positive rheumatoid factor, involving unspecified site ([facility]     3. Chronic obstructive pulmonary disease with acute exacerbation ([facility] [address]     4. Essential hypertension     5. Depression, unspecified depression type     6. Situational anxiety  LORazepam (ATIVAN) 0.5 MG tablet   7. Anxiety  Amb External Referral To Orthopedic Surgery        PLAN:  Orders Placed This Encounter   Medications    predniSONE (DELTASONE) 10 MG tablet     Sig: Use as directed, with food     Dispense:  48 tablet     Refill:  0    LORazepam (ATIVAN) 0.5 MG tablet     Sig: Take 1 tablet by mouth 2 times daily as needed for Anxiety for up to 30 days. Dispense:  180 tablet     Refill:  0        Medications Discontinued During This Encounter   Medication Reason    umeclidinium-vilanterol (ANORO ELLIPTA) 62.5-25 MCG/INH AEPB inhaler     budesonide-formoterol (SYMBICORT) 160-4.5 MCG/ACT AERO        Controlled Substances Monitoring:      Please note: This chart was generated using Dragon dictation software. Although every effort was made to ensure the accuracy of this automated transcription, some errors in transcription may have occurred.

## 2020-05-21 ENCOUNTER — TELEPHONE (OUTPATIENT)
Dept: FAMILY MEDICINE CLINIC | Age: 75
End: 2020-05-21

## 2020-05-21 NOTE — TELEPHONE ENCOUNTER
Patient went to Dr. Raiford Habermann today. She said that he would not do anything for her. Can she be sent to someone else?

## 2020-06-11 ENCOUNTER — VIRTUAL VISIT (OUTPATIENT)
Dept: FAMILY MEDICINE CLINIC | Age: 75
End: 2020-06-11
Payer: MEDICARE

## 2020-06-11 PROCEDURE — G2025 DIS SITE TELE SVCS RHC/FQHC: HCPCS | Performed by: FAMILY MEDICINE

## 2020-06-11 RX ORDER — ALBUTEROL SULFATE 2.5 MG/3ML
2.5 SOLUTION RESPIRATORY (INHALATION) EVERY 4 HOURS PRN
Qty: 25 VIAL | Refills: 2 | Status: SHIPPED | OUTPATIENT
Start: 2020-06-11 | End: 2020-06-15 | Stop reason: SDUPTHER

## 2020-06-15 RX ORDER — ALBUTEROL SULFATE 2.5 MG/3ML
2.5 SOLUTION RESPIRATORY (INHALATION) EVERY 4 HOURS PRN
Qty: 120 VIAL | Refills: 5 | Status: SHIPPED | OUTPATIENT
Start: 2020-06-15 | End: 2020-11-16 | Stop reason: SDUPTHER

## 2020-06-15 RX ORDER — ALBUTEROL SULFATE 2.5 MG/3ML
2.5 SOLUTION RESPIRATORY (INHALATION) EVERY 4 HOURS PRN
Qty: 120 VIAL | Refills: 5 | Status: SHIPPED | OUTPATIENT
Start: 2020-06-15 | End: 2020-06-15 | Stop reason: SDUPTHER

## 2020-06-22 RX ORDER — LOSARTAN POTASSIUM 100 MG/1
TABLET ORAL
Qty: 90 TABLET | Refills: 3 | Status: SHIPPED | OUTPATIENT
Start: 2020-06-22 | End: 2020-11-16 | Stop reason: SDUPTHER

## 2020-06-30 RX ORDER — SIMETHICONE 80 MG
TABLET,CHEWABLE ORAL
Qty: 120 TABLET | Refills: 5 | Status: SHIPPED | OUTPATIENT
Start: 2020-06-30 | End: 2020-08-06

## 2020-08-06 ENCOUNTER — HOSPITAL ENCOUNTER (OUTPATIENT)
Facility: HOSPITAL | Age: 75
Discharge: HOME | End: 2020-08-06
Payer: MEDICARE

## 2020-08-06 ENCOUNTER — OFFICE VISIT (OUTPATIENT)
Dept: FAMILY MEDICINE CLINIC | Age: 75
End: 2020-08-06
Payer: MEDICARE

## 2020-08-06 VITALS
SYSTOLIC BLOOD PRESSURE: 138 MMHG | WEIGHT: 193.9 LBS | DIASTOLIC BLOOD PRESSURE: 76 MMHG | OXYGEN SATURATION: 99 % | RESPIRATION RATE: 18 BRPM | BODY MASS INDEX: 36.61 KG/M2 | HEART RATE: 68 BPM | HEIGHT: 61 IN

## 2020-08-06 PROCEDURE — G8427 DOCREV CUR MEDS BY ELIG CLIN: HCPCS | Performed by: FAMILY MEDICINE

## 2020-08-06 PROCEDURE — 85027 COMPLETE CBC AUTOMATED: CPT

## 2020-08-06 PROCEDURE — 80061 LIPID PANEL: CPT

## 2020-08-06 PROCEDURE — 3017F COLORECTAL CA SCREEN DOC REV: CPT | Performed by: FAMILY MEDICINE

## 2020-08-06 PROCEDURE — G8399 PT W/DXA RESULTS DOCUMENT: HCPCS | Performed by: FAMILY MEDICINE

## 2020-08-06 PROCEDURE — 3023F SPIROM DOC REV: CPT | Performed by: FAMILY MEDICINE

## 2020-08-06 PROCEDURE — 99214 OFFICE O/P EST MOD 30 MIN: CPT | Performed by: FAMILY MEDICINE

## 2020-08-06 PROCEDURE — 82607 VITAMIN B-12: CPT

## 2020-08-06 PROCEDURE — 82306 VITAMIN D 25 HYDROXY: CPT

## 2020-08-06 PROCEDURE — 82746 ASSAY OF FOLIC ACID SERUM: CPT

## 2020-08-06 PROCEDURE — G8926 SPIRO NO PERF OR DOC: HCPCS | Performed by: FAMILY MEDICINE

## 2020-08-06 PROCEDURE — 84443 ASSAY THYROID STIM HORMONE: CPT

## 2020-08-06 PROCEDURE — 1090F PRES/ABSN URINE INCON ASSESS: CPT | Performed by: FAMILY MEDICINE

## 2020-08-06 PROCEDURE — 80053 COMPREHEN METABOLIC PANEL: CPT

## 2020-08-06 PROCEDURE — 1036F TOBACCO NON-USER: CPT | Performed by: FAMILY MEDICINE

## 2020-08-06 PROCEDURE — 1123F ACP DISCUSS/DSCN MKR DOCD: CPT | Performed by: FAMILY MEDICINE

## 2020-08-06 PROCEDURE — 4040F PNEUMOC VAC/ADMIN/RCVD: CPT | Performed by: FAMILY MEDICINE

## 2020-08-06 PROCEDURE — G8417 CALC BMI ABV UP PARAM F/U: HCPCS | Performed by: FAMILY MEDICINE

## 2020-08-06 RX ORDER — LORAZEPAM 0.5 MG/1
0.5 TABLET ORAL 2 TIMES DAILY PRN
Qty: 180 TABLET | Refills: 0 | Status: SHIPPED | OUTPATIENT
Start: 2020-08-06 | End: 2020-09-03

## 2020-08-06 RX ORDER — AMLODIPINE BESYLATE 5 MG/1
5 TABLET ORAL DAILY
Qty: 90 TABLET | Refills: 3 | Status: SHIPPED | OUTPATIENT
Start: 2020-08-06

## 2020-08-06 RX ORDER — TRAZODONE HYDROCHLORIDE 50 MG/1
TABLET ORAL
Qty: 180 TABLET | Refills: 3 | Status: SHIPPED | OUTPATIENT
Start: 2020-08-06

## 2020-08-06 RX ORDER — BUPROPION HYDROCHLORIDE 100 MG/1
100 TABLET, EXTENDED RELEASE ORAL 2 TIMES DAILY
Qty: 180 TABLET | Refills: 3 | Status: SHIPPED | OUTPATIENT
Start: 2020-08-06 | End: 2020-09-09

## 2020-08-06 NOTE — PROGRESS NOTES
SUBJECTIVE:    Patient ID: Freddy Boyce is a 70 y.o. female. Chief Complaint   Patient presents with    Hypertension    Back Pain    COPD     not sure which one she is using in nebulizer       HPI: office visit  She is seeing the spinal surgeon of [facility] of possible spinal surgery. She is suffering in pain. She is having concerns of her lung issues and possible surgery. She is still sob and coughing. She says she gets more of these coughing spells. She says she is hving so much pain. She is not taking any pain medication. She is afriad of gettting addictive. She is not having any high blood pressure. She still struggles with some depressive symptoms. She does feel like the medication helps some. She says she still having a tremendous amount of fatigue. Review of Systems   Constitutional: Positive for fatigue. Musculoskeletal: Positive for gait problem. Psychiatric/Behavioral: Positive for agitation. The patient is nervous/anxious. All other systems reviewed and are negative. OBJECTIVE:  /76   Pulse 68   Resp 18   Ht 5' 1\" (1.549 m)   Wt 193 lb 14.4 oz (88 kg)   SpO2 99% Comment: ra  BMI 36.64 kg/m²    Wt Readings from Last 3 Encounters:   08/06/20 193 lb 14.4 oz (88 kg)   05/04/20 195 lb 6.4 oz (88.6 kg)   03/23/20 200 lb 12.8 oz (91.1 kg)     BP Readings from Last 3 Encounters:   08/06/20 138/76   05/04/20 136/86   03/23/20 134/74      Pulse Readings from Last 3 Encounters:   08/06/20 68   05/04/20 84   03/23/20 75     Body mass index is 36.64 kg/m². Resp Readings from Last 3 Encounters:   08/06/20 18   03/23/20 18   02/04/20 18     Past medical, surgical, family and social history were reviewed and updated with the patient. Physical Exam  Vitals signs and nursing note reviewed. Constitutional:       Appearance: Normal appearance. She is well-developed. HENT:      Head: Normocephalic.       Nose: Nose normal.   Neck:      Musculoskeletal: Normal range of motion 91 (8/6/2020) 74 - 106 mg/dL Not in Time Range    Potassium 4.4 (8/6/2020) 3.4 - 5.1 mmol/L Not in Time Range    Sodium 137 (8/6/2020) 136 - 145 mmol/L Not in Time Range    Total Bilirubin 0.5 (8/6/2020) 0.3 - 1.2 mg/dL Not in Time Range    Total Protein 6.5 (8/6/2020) 6.4 - 8.3 g/dL Not in Time Range      LDL Calculated (mg/dL)   Date Value   08/06/2020 72       Lab Results   Component Value Date    WBC 6.8 08/06/2020    NEUTROABS 4.3 07/17/2017    HGB 13.3 08/06/2020    HCT 40.5 08/06/2020    MCV 96.0 08/06/2020     08/06/2020     Lab Results   Component Value Date    TSH 2.83 08/06/2020       ASSESSMENT:    Diagnosis Orders   1. Chronic left-sided low back pain with left-sided sciatica     2. Situational anxiety  LORazepam (ATIVAN) 0.5 MG tablet   3. Rheumatoid arthritis with positive rheumatoid factor, involving unspecified site ([facility] [address]     4. Chronic obstructive pulmonary disease with acute exacerbation [facility])  External Referral To Pulmonology   5. Essential hypertension  COMPREHENSIVE METABOLIC PANEL   6. Depression, unspecified depression type     7. Anxiety     8. Osteoporosis without current pathological fracture, unspecified osteoporosis type  DEXA Bone Density Axial Skeleton   9. Vitamin D deficiency  VITAMIN D 25 HYDROXY   10. Fatigue, unspecified type  COMPREHENSIVE METABOLIC PANEL    CBC    TSH without Reflex    VITAMIN B12 & FOLATE   11. Screening, lipid  LIPID PANEL        PLAN:  Orders Placed This Encounter   Medications    LORazepam (ATIVAN) 0.5 MG tablet     Sig: Take 1 tablet by mouth 2 times daily as needed for Anxiety for up to 30 days. Dispense:  180 tablet     Refill:  0    amLODIPine (NORVASC) 5 MG tablet     Sig: Take 1 tablet by mouth daily     Dispense:  90 tablet     Refill:  3    traZODone (DESYREL) 50 MG tablet     Sig: Take one to two tablets as needed for sleep.      Dispense:  180 tablet     Refill:  3    buPROPion (WELLBUTRIN SR) 100 MG extended release tablet     Sig: Take 1 tablet by mouth 2 times daily     Dispense:  180 tablet     Refill:  3        Medications Discontinued During This Encounter   Medication Reason    budesonide (PULMICORT FLEXHALER) 180 MCG/ACT AEPB inhaler LIST CLEANUP    simethicone (MYLICON) 80 MG chewable tablet LIST CLEANUP    LORazepam (ATIVAN) 0.5 MG tablet REORDER    amLODIPine (NORVASC) 5 MG tablet REORDER    traZODone (DESYREL) 50 MG tablet REORDER    buPROPion (WELLBUTRIN SR) 100 MG extended release tablet REORDER       Controlled Substances Monitoring:      Please note: This chart was generated using Dragon dictation software. Although every effort was made to ensure the accuracy of this automated transcription, some errors in transcription may have occurred.

## 2020-08-07 LAB
A/G RATIO: 1.7 (ref 0.8–2)
ALBUMIN SERPL-MCNC: 4.1 G/DL (ref 3.4–4.8)
ALP BLD-CCNC: 53 U/L (ref 25–100)
ALT SERPL-CCNC: 12 U/L (ref 4–36)
ANION GAP SERPL CALCULATED.3IONS-SCNC: 8 MMOL/L (ref 3–16)
AST SERPL-CCNC: 15 U/L (ref 8–33)
BILIRUB SERPL-MCNC: 0.5 MG/DL (ref 0.3–1.2)
BUN BLDV-MCNC: 22 MG/DL (ref 6–20)
CALCIUM SERPL-MCNC: 10.3 MG/DL (ref 8.5–10.5)
CHLORIDE BLD-SCNC: 101 MMOL/L (ref 98–107)
CHOLESTEROL, TOTAL: 155 MG/DL (ref 0–200)
CO2: 28 MMOL/L (ref 20–30)
CREAT SERPL-MCNC: 0.9 MG/DL (ref 0.4–1.2)
FOLATE: 15.21 NG/ML
GFR AFRICAN AMERICAN: >59
GFR NON-AFRICAN AMERICAN: >60
GLOBULIN: 2.4 G/DL
GLUCOSE BLD-MCNC: 91 MG/DL (ref 74–106)
HCT VFR BLD CALC: 40.5 % (ref 37–47)
HDLC SERPL-MCNC: 68 MG/DL (ref 40–60)
HEMOGLOBIN: 13.3 G/DL (ref 11.5–16.5)
LDL CHOLESTEROL CALCULATED: 72 MG/DL
MCH RBC QN AUTO: 31.5 PG (ref 27–32)
MCHC RBC AUTO-ENTMCNC: 32.8 G/DL (ref 31–35)
MCV RBC AUTO: 96 FL (ref 80–100)
PDW BLD-RTO: 13.2 % (ref 11–16)
PLATELET # BLD: 157 K/UL (ref 150–400)
PMV BLD AUTO: 12.6 FL (ref 6–10)
POTASSIUM SERPL-SCNC: 4.4 MMOL/L (ref 3.4–5.1)
RBC # BLD: 4.22 M/UL (ref 3.8–5.8)
SODIUM BLD-SCNC: 137 MMOL/L (ref 136–145)
TOTAL PROTEIN: 6.5 G/DL (ref 6.4–8.3)
TRIGL SERPL-MCNC: 76 MG/DL (ref 0–249)
TSH SERPL DL<=0.05 MIU/L-ACNC: 2.83 UIU/ML (ref 0.35–5.5)
VITAMIN B-12: 511 PG/ML (ref 211–911)
VITAMIN D 25-HYDROXY: 41.4 (ref 32–100)
VLDLC SERPL CALC-MCNC: 15 MG/DL
WBC # BLD: 6.8 K/UL (ref 4–11)

## 2020-08-12 ENCOUNTER — TELEPHONE (OUTPATIENT)
Dept: FAMILY MEDICINE CLINIC | Age: 75
End: 2020-08-12

## 2020-09-02 NOTE — PROGRESS NOTES
Health Maintenance Due This Visit   Colonoscopy No   Mammogram yes   Annual Wellness Visit Yes   Microalbumin No   HgbA1C No   Diabetic Eye Exam No    House Bill One Due This Visit   RONEL Yes   UDS Yes   Contract Yes    No chief complaint on file. Have you seen any other physician or provider since your last visit yes - dr. Lesvia Tristan    Have you had any other diagnostic tests since your last visit? no    Have you changed or stopped any medications since your last visit? no     I have recommended that this patient have a mammogram but she declines at this time. I have discussed the risks and benefits of this examination with her. The patient verbalizes understanding.

## 2020-09-03 ENCOUNTER — OFFICE VISIT (OUTPATIENT)
Dept: FAMILY MEDICINE CLINIC | Age: 75
End: 2020-09-03
Payer: MEDICARE

## 2020-09-03 VITALS
HEIGHT: 61 IN | DIASTOLIC BLOOD PRESSURE: 78 MMHG | HEART RATE: 79 BPM | SYSTOLIC BLOOD PRESSURE: 138 MMHG | RESPIRATION RATE: 18 BRPM | WEIGHT: 199 LBS | BODY MASS INDEX: 37.57 KG/M2 | OXYGEN SATURATION: 98 %

## 2020-09-03 PROCEDURE — 3017F COLORECTAL CA SCREEN DOC REV: CPT | Performed by: FAMILY MEDICINE

## 2020-09-03 PROCEDURE — G8427 DOCREV CUR MEDS BY ELIG CLIN: HCPCS | Performed by: FAMILY MEDICINE

## 2020-09-03 PROCEDURE — 4040F PNEUMOC VAC/ADMIN/RCVD: CPT | Performed by: FAMILY MEDICINE

## 2020-09-03 PROCEDURE — G8926 SPIRO NO PERF OR DOC: HCPCS | Performed by: FAMILY MEDICINE

## 2020-09-03 PROCEDURE — 1090F PRES/ABSN URINE INCON ASSESS: CPT | Performed by: FAMILY MEDICINE

## 2020-09-03 PROCEDURE — 3023F SPIROM DOC REV: CPT | Performed by: FAMILY MEDICINE

## 2020-09-03 PROCEDURE — G8399 PT W/DXA RESULTS DOCUMENT: HCPCS | Performed by: FAMILY MEDICINE

## 2020-09-03 PROCEDURE — G8417 CALC BMI ABV UP PARAM F/U: HCPCS | Performed by: FAMILY MEDICINE

## 2020-09-03 PROCEDURE — 1123F ACP DISCUSS/DSCN MKR DOCD: CPT | Performed by: FAMILY MEDICINE

## 2020-09-03 PROCEDURE — 99214 OFFICE O/P EST MOD 30 MIN: CPT | Performed by: FAMILY MEDICINE

## 2020-09-03 PROCEDURE — 1036F TOBACCO NON-USER: CPT | Performed by: FAMILY MEDICINE

## 2020-09-03 RX ORDER — LORAZEPAM 0.5 MG/1
0.5 TABLET ORAL 2 TIMES DAILY PRN
Qty: 180 TABLET | Refills: 0 | Status: SHIPPED | OUTPATIENT
Start: 2020-09-03 | End: 2020-11-02 | Stop reason: SDUPTHER

## 2020-09-03 RX ORDER — LORAZEPAM 0.5 MG/1
0.5 TABLET ORAL 2 TIMES DAILY PRN
Qty: 180 TABLET | Refills: 0 | Status: SHIPPED | OUTPATIENT
Start: 2020-09-03 | End: 2020-09-03 | Stop reason: SDUPTHER

## 2020-09-03 RX ORDER — HYDROCODONE BITARTRATE AND ACETAMINOPHEN 7.5; 325 MG/1; MG/1
1 TABLET ORAL EVERY 8 HOURS PRN
Qty: 60 TABLET | Refills: 0 | Status: SHIPPED | OUTPATIENT
Start: 2020-09-03 | End: 2020-09-03 | Stop reason: SDUPTHER

## 2020-09-03 RX ORDER — HYDROCODONE BITARTRATE AND ACETAMINOPHEN 7.5; 325 MG/1; MG/1
1 TABLET ORAL EVERY 8 HOURS PRN
Qty: 60 TABLET | Refills: 0 | Status: SHIPPED | OUTPATIENT
Start: 2020-09-03 | End: 2020-10-07

## 2020-09-03 RX ORDER — LEVOCETIRIZINE DIHYDROCHLORIDE 5 MG/1
5 TABLET, FILM COATED ORAL NIGHTLY
Qty: 30 TABLET | Refills: 11 | Status: SHIPPED | OUTPATIENT
Start: 2020-09-03

## 2020-09-03 RX ORDER — MONTELUKAST SODIUM 10 MG/1
TABLET ORAL
Qty: 90 TABLET | Refills: 3 | Status: SHIPPED | OUTPATIENT
Start: 2020-09-03

## 2020-09-03 RX ORDER — RISEDRONATE SODIUM 35 MG/1
35 TABLET, FILM COATED ORAL
Qty: 4 TABLET | Refills: 3 | Status: SHIPPED | OUTPATIENT
Start: 2020-09-03 | End: 2020-11-02

## 2020-09-03 RX ORDER — CARVEDILOL 25 MG/1
25 TABLET ORAL 2 TIMES DAILY
Qty: 180 TABLET | Refills: 3 | Status: SHIPPED | OUTPATIENT
Start: 2020-09-03

## 2020-09-03 RX ORDER — ROPINIROLE 3 MG/1
3 TABLET, FILM COATED ORAL 4 TIMES DAILY
Qty: 360 TABLET | Refills: 3 | Status: SHIPPED | OUTPATIENT
Start: 2020-09-03

## 2020-09-03 ASSESSMENT — PATIENT HEALTH QUESTIONNAIRE - PHQ9
SUM OF ALL RESPONSES TO PHQ9 QUESTIONS 1 & 2: 0
1. LITTLE INTEREST OR PLEASURE IN DOING THINGS: 0
2. FEELING DOWN, DEPRESSED OR HOPELESS: 0
SUM OF ALL RESPONSES TO PHQ QUESTIONS 1-9: 0
SUM OF ALL RESPONSES TO PHQ QUESTIONS 1-9: 0

## 2020-09-03 ASSESSMENT — ENCOUNTER SYMPTOMS
SHORTNESS OF BREATH: 1
WHEEZING: 1
COUGH: 1

## 2020-09-03 NOTE — PROGRESS NOTES
SUBJECTIVE:    Patient ID: Michael Dutton is a 70 y.o. female. Chief Complaint   Patient presents with    Pre-op Exam     left total knee       HPI: office visit  She is here getting ready have a total knee replacement. She says she feels like she is breathing about as good as she will be. She has not had any significant increase in her cough. She has not had increased sputum production. She says her knee is killing her. She is going to have to do something about that. She has not had any chest pain. She says her blood pressures have been good at home. She is not having significant amount of swelling. Review of Systems   Constitutional: Positive for fatigue. Respiratory: Positive for cough, shortness of breath and wheezing. Musculoskeletal: Positive for gait problem. Psychiatric/Behavioral: Positive for agitation. The patient is nervous/anxious. All other systems reviewed and are negative. OBJECTIVE:  /78   Pulse 79   Resp 18   Ht 5' 1\" (1.549 m)   Wt 199 lb (90.3 kg)   SpO2 98%   BMI 37.60 kg/m²    Wt Readings from Last 3 Encounters:   09/03/20 199 lb (90.3 kg)   08/06/20 193 lb 14.4 oz (88 kg)   05/04/20 195 lb 6.4 oz (88.6 kg)     BP Readings from Last 3 Encounters:   09/03/20 138/78   08/06/20 138/76   05/04/20 136/86      Pulse Readings from Last 3 Encounters:   09/03/20 79   08/06/20 68   05/04/20 84     Body mass index is 37.6 kg/m². Resp Readings from Last 3 Encounters:   09/03/20 18   08/06/20 18   03/23/20 18     Past medical, surgical, family and social history were reviewed and updated with the patient. Physical Exam  Vitals signs and nursing note reviewed. Constitutional:       Appearance: Normal appearance. She is well-developed. HENT:      Head: Normocephalic. Nose: Nose normal.   Neck:      Musculoskeletal: Normal range of motion and neck supple. Cardiovascular:      Rate and Rhythm: Normal rate and regular rhythm.    Pulmonary: Effort: Pulmonary effort is normal.      Breath sounds: Normal breath sounds. Musculoskeletal:      Right knee: She exhibits decreased range of motion. Tenderness found. Medial joint line and lateral joint line tenderness noted. Left knee: She exhibits decreased range of motion. Tenderness found. Medial joint line and lateral joint line tenderness noted. Thoracic back: She exhibits decreased range of motion and tenderness. Lumbar back: She exhibits decreased range of motion, tenderness, pain and spasm. Skin:     General: Skin is warm and dry. Neurological:      Mental Status: She is alert and oriented to person, place, and time. Psychiatric:         Attention and Perception: Attention normal.         Mood and Affect: Mood normal.         Speech: Speech normal.         Behavior: Behavior is agitated. Thought Content:  Thought content normal.         Cognition and Memory: Cognition normal.         Judgment: Judgment normal.          Results in Past 30 Days  Result Component Current Result Ref Range Previous Result Ref Range   Alb 4.1 (8/6/2020) 3.4 - 4.8 g/dL Not in Time Range    Albumin/Globulin Ratio 1.7 (8/6/2020) 0.8 - 2.0 Not in Time Range    Alkaline Phosphatase 53 (8/6/2020) 25 - 100 U/L Not in Time Range    ALT 12 (8/6/2020) 4 - 36 U/L Not in Time Range    AST 15 (8/6/2020) 8 - 33 U/L Not in Time Range    BUN 22 (H) (8/6/2020) 6 - 20 mg/dL Not in Time Range    Calcium 10.3 (8/6/2020) 8.5 - 10.5 mg/dL Not in Time Range    Chloride 101 (8/6/2020) 98 - 107 mmol/L Not in Time Range    CO2 28 (8/6/2020) 20 - 30 mmol/L Not in Time Range    CREATININE 0.9 (8/6/2020) 0.4 - 1.2 mg/dL Not in Time Range    GFR  >59 (8/6/2020) >59 Not in Time Range    GFR Non- >60 (8/6/2020) >59 Not in Time Range    Globulin 2.4 (8/6/2020) g/dL Not in Time Range    Glucose 91 (8/6/2020) 74 - 106 mg/dL Not in Time Range    Potassium 4.4 (8/6/2020) 3.4 - 5.1 mmol/L Not in Time

## 2020-09-08 NOTE — PROGRESS NOTES
Health Maintenance Due This Visit   Colonoscopy No   Mammogram No   Annual Wellness Visit Yes   Microalbumin No   HgbA1C No   Diabetic Eye Exam No    House Bill One Due This Visit   RONEL No   UDS No   Contract No

## 2020-09-09 ENCOUNTER — TELEPHONE (OUTPATIENT)
Dept: FAMILY MEDICINE CLINIC | Age: 75
End: 2020-09-09

## 2020-09-09 ENCOUNTER — OFFICE VISIT (OUTPATIENT)
Dept: FAMILY MEDICINE CLINIC | Age: 75
End: 2020-09-09
Payer: MEDICARE

## 2020-09-09 VITALS
DIASTOLIC BLOOD PRESSURE: 70 MMHG | BODY MASS INDEX: 37.76 KG/M2 | RESPIRATION RATE: 20 BRPM | HEIGHT: 61 IN | WEIGHT: 200 LBS | OXYGEN SATURATION: 98 % | SYSTOLIC BLOOD PRESSURE: 132 MMHG | HEART RATE: 64 BPM

## 2020-09-09 PROCEDURE — G8926 SPIRO NO PERF OR DOC: HCPCS | Performed by: FAMILY MEDICINE

## 2020-09-09 PROCEDURE — 1036F TOBACCO NON-USER: CPT | Performed by: FAMILY MEDICINE

## 2020-09-09 PROCEDURE — 1090F PRES/ABSN URINE INCON ASSESS: CPT | Performed by: FAMILY MEDICINE

## 2020-09-09 PROCEDURE — G8427 DOCREV CUR MEDS BY ELIG CLIN: HCPCS | Performed by: FAMILY MEDICINE

## 2020-09-09 PROCEDURE — 4040F PNEUMOC VAC/ADMIN/RCVD: CPT | Performed by: FAMILY MEDICINE

## 2020-09-09 PROCEDURE — G8417 CALC BMI ABV UP PARAM F/U: HCPCS | Performed by: FAMILY MEDICINE

## 2020-09-09 PROCEDURE — 1123F ACP DISCUSS/DSCN MKR DOCD: CPT | Performed by: FAMILY MEDICINE

## 2020-09-09 PROCEDURE — 3023F SPIROM DOC REV: CPT | Performed by: FAMILY MEDICINE

## 2020-09-09 PROCEDURE — G8399 PT W/DXA RESULTS DOCUMENT: HCPCS | Performed by: FAMILY MEDICINE

## 2020-09-09 PROCEDURE — 99214 OFFICE O/P EST MOD 30 MIN: CPT | Performed by: FAMILY MEDICINE

## 2020-09-09 PROCEDURE — 3017F COLORECTAL CA SCREEN DOC REV: CPT | Performed by: FAMILY MEDICINE

## 2020-09-09 RX ORDER — BUPROPION HYDROCHLORIDE 300 MG/1
300 TABLET ORAL EVERY MORNING
Qty: 30 TABLET | Refills: 2 | Status: SHIPPED | OUTPATIENT
Start: 2020-09-09 | End: 2020-11-16 | Stop reason: SDUPTHER

## 2020-09-09 ASSESSMENT — PATIENT HEALTH QUESTIONNAIRE - PHQ9
SUM OF ALL RESPONSES TO PHQ QUESTIONS 1-9: 0
SUM OF ALL RESPONSES TO PHQ9 QUESTIONS 1 & 2: 0
SUM OF ALL RESPONSES TO PHQ QUESTIONS 1-9: 0
2. FEELING DOWN, DEPRESSED OR HOPELESS: 0
1. LITTLE INTEREST OR PLEASURE IN DOING THINGS: 0

## 2020-09-09 ASSESSMENT — ENCOUNTER SYMPTOMS
SHORTNESS OF BREATH: 1
COUGH: 1
WHEEZING: 1

## 2020-09-09 NOTE — TELEPHONE ENCOUNTER
Patient's referral, along with all pertinent medical records faxed to the attention [facility] (Dr. Trey Yoder(Cop) on 09/09/20, they will contact the patient and our office with appointment date/time/location.

## 2020-09-09 NOTE — PROGRESS NOTES
SUBJECTIVE:    Patient ID: Khushboo Flores is a 70 y.o. female. Chief Complaint   Patient presents with    COPD     still continue to have shortness of breath -started new inhaler and xyzal since last visit    Knee Pain       HPI: office visit  She is here today complaining of continued issues with shortness of breath. She is really struggling with her breathing. She is taking the Xyzal.  She is taking the new inhaler. She cannot tell a whole lot of difference. She is really still struggling with her knee. She is going to have to see the specialist about trying to get a surgery on her knee. She says she is just not able to stand it anymore. She is so in pain all the time that she really cannot do anything. Review of Systems   Constitutional: Positive for fatigue. Respiratory: Positive for cough, shortness of breath and wheezing. Musculoskeletal: Positive for gait problem. Psychiatric/Behavioral: Positive for agitation. The patient is nervous/anxious. All other systems reviewed and are negative. OBJECTIVE:  /70   Pulse 64   Resp 20   Ht 5' 1\" (1.549 m)   Wt 200 lb (90.7 kg)   SpO2 98% Comment: room air  Breastfeeding No   BMI 37.79 kg/m²    Wt Readings from Last 3 Encounters:   09/09/20 200 lb (90.7 kg)   09/03/20 199 lb (90.3 kg)   08/06/20 193 lb 14.4 oz (88 kg)     BP Readings from Last 3 Encounters:   09/09/20 132/70   09/03/20 138/78   08/06/20 138/76      Pulse Readings from Last 3 Encounters:   09/09/20 64   09/03/20 79   08/06/20 68     Body mass index is 37.79 kg/m². Resp Readings from Last 3 Encounters:   09/09/20 20   09/03/20 18   08/06/20 18     Past medical, surgical, family and social history were reviewed and updated with the patient. Physical Exam  Vitals signs and nursing note reviewed. Constitutional:       Appearance: Normal appearance. She is well-developed. HENT:      Head: Normocephalic.       Nose: Nose normal.   Neck:      Musculoskeletal: Normal range of motion and neck supple. Cardiovascular:      Rate and Rhythm: Normal rate and regular rhythm. Pulmonary:      Effort: Pulmonary effort is normal.      Breath sounds: Normal breath sounds. Musculoskeletal:      Right knee: She exhibits decreased range of motion. Tenderness found. Medial joint line and lateral joint line tenderness noted. Left knee: She exhibits decreased range of motion. Tenderness found. Medial joint line and lateral joint line tenderness noted. Thoracic back: She exhibits decreased range of motion and tenderness. Lumbar back: She exhibits decreased range of motion, tenderness, pain and spasm. Skin:     General: Skin is warm and dry. Neurological:      Mental Status: She is alert and oriented to person, place, and time. Psychiatric:         Attention and Perception: Attention normal.         Mood and Affect: Mood normal.         Speech: Speech normal.         Behavior: Behavior is agitated. Thought Content: Thought content normal.         Cognition and Memory: Cognition normal.         Judgment: Judgment normal.          No results found for requested labs within last 30 days. LDL Calculated (mg/dL)   Date Value   08/06/2020 72       Lab Results   Component Value Date    WBC 6.8 08/06/2020    NEUTROABS 4.3 07/17/2017    HGB 13.3 08/06/2020    HCT 40.5 08/06/2020    MCV 96.0 08/06/2020     08/06/2020     Lab Results   Component Value Date    TSH 2.83 08/06/2020       ASSESSMENT:    Diagnosis Orders   1. Chronic obstructive pulmonary disease with acute exacerbation [facility])  External Referral To Pulmonology   2. Encounter for screening mammogram for breast cancer     3. Cough  External Referral To Pulmonology   4. Depression, unspecified depression type     5. Situational anxiety     6.  Chronic pain of left knee          PLAN:  Orders Placed This Encounter   Medications    buPROPion (WELLBUTRIN XL) 300 MG extended release tablet     Sig: Take 1 tablet by mouth every morning     Dispense:  30 tablet     Refill:  2        Medications Discontinued During This Encounter   Medication Reason    buPROPion (WELLBUTRIN SR) 100 MG extended release tablet        Controlled Substances Monitoring:      Please note: This chart was generated using Dragon dictation software. Although every effort was made to ensure the accuracy of this automated transcription, some errors in transcription may have occurred.

## 2020-09-14 ENCOUNTER — TELEPHONE (OUTPATIENT)
Dept: FAMILY MEDICINE CLINIC | Age: 75
End: 2020-09-14

## 2020-09-14 ENCOUNTER — NURSE TRIAGE (OUTPATIENT)
Dept: OTHER | Facility: CLINIC | Age: 75
End: 2020-09-14

## 2020-09-14 RX ORDER — POTASSIUM CHLORIDE 750 MG/1
10 TABLET, EXTENDED RELEASE ORAL DAILY
Qty: 30 TABLET | Refills: 0 | Status: SHIPPED | OUTPATIENT
Start: 2020-09-14 | End: 2020-10-08

## 2020-09-14 RX ORDER — FUROSEMIDE 20 MG/1
20 TABLET ORAL DAILY
Qty: 30 TABLET | Refills: 0 | Status: SHIPPED | OUTPATIENT
Start: 2020-09-14 | End: 2020-10-08

## 2020-09-14 NOTE — TELEPHONE ENCOUNTER
Reason for Disposition   SEVERE swelling (e.g., swelling extends above knee, entire leg is swollen, weeping fluid)    Answer Assessment - Initial Assessment Questions  1. ONSET: \"When did the swelling start? \" (e.g., minutes, hours, days)      Thursday     2. LOCATION: \"What part of the leg is swollen? \"  \"Are both legs swollen or just one leg? \"        BL Feet, ankles, and into thigh     3. SEVERITY: \"How bad is the swelling? \" (e.g., localized; mild, moderate, severe)   - Localized - small area of swelling localized to one leg   - MILD pedal edema - swelling limited to foot and ankle, pitting edema < 1/4 inch (6 mm) deep, rest and elevation eliminate most or all swelling   - MODERATE edema - swelling of lower leg to knee, pitting edema > 1/4 inch (6 mm) deep, rest and elevation only partially reduce swelling   - SEVERE edema - swelling extends above knee, facial or hand swelling present         Severe- into thigh, denies face and hand swelling     4. REDNESS: \"Does the swelling look red or infected? \"         Denies     5. PAIN: \"Is the swelling painful to touch? \" If so, ask: \"How painful is it? \"   (Scale 1-10; mild, moderate or severe)         6/10    6. FEVER: \"Do you have a fever? \" If so, ask: \"What is it, how was it measured, and when did it start? \"          Denies    7. CAUSE: \"What do you think is causing the leg swelling? \"         Swelling in past but not like this and has been a long time since last     8. MEDICAL HISTORY: \"Do you have a history of heart failure, kidney disease, liver failure, or cancer? \"          Denies    9. RECURRENT SYMPTOM: \"Have you had leg swelling before? \" If so, ask: \"When was the last time? \" \"What happened that time? \"         Pt was placed on \"water pill\" in past but not currently on them    10. OTHER SYMPTOMS: \"Do you have any other symptoms? \" (e.g., chest pain, difficulty breathing)          Denies    11. PREGNANCY: \"Is there any chance you are pregnant? \" \"When was your last menstrual period? \"        NA    Protocols used: LEG SWELLING AND EDEMA-ADULT-[place]    Patient called [facility]-service [facility]) to schedule appointment, with red flag complaint, transferred to RN access for triage. Caller reports symptoms as documented above. Caller informed of disposition. Soft transfer to Dori Gutierrez in pre-service center to  schedule appointment as requested by pt. Pt hung up while waiting for [facility]. Writer transferred to PCP office and spoke to Sarah and office will attempt to call pt back and get her seen today. Care advice as documented. Pt verbalized understanding and is unable to go to ED at this time. Pts  is currently in hospital with a mass on brain and scheduled for surgery and pt is unable at this time. Please do not respond to the triage nurse through this encounter. Any subsequent communication should be directly with the patient.

## 2020-09-14 NOTE — TELEPHONE ENCOUNTER
Patient called c/o swelling in both lower extremities - refused to come in at this time due to her  is in the hospital very ill - she requested diuretic stating she was on one in the past to take prn    Dr. Meena Keys notified, orders to send Lasix 20mg once daily, KCL 10meq once daily and check BMP as outpatient in one week. Medications sent to pharmacy - patient notified of all orders and verbalized understanding.

## 2020-09-25 NOTE — TELEPHONE ENCOUNTER
Patient scheduled to see Dr. Daniela Merino [facility]) on 10/21/20 at 9. Patient's referral, along with all pertinent medical records faxed to the attention of scheduling on 09/09/20. Patient contacted advised of appointment date/time/location. Patient verbalized understanding of all instructions.  (Per Pulm office)

## 2020-10-07 ENCOUNTER — OFFICE VISIT (OUTPATIENT)
Dept: FAMILY MEDICINE CLINIC | Age: 75
End: 2020-10-07
Payer: MEDICARE

## 2020-10-07 ENCOUNTER — HOSPITAL ENCOUNTER (OUTPATIENT)
Facility: HOSPITAL | Age: 75
Discharge: HOME | End: 2020-10-07
Payer: MEDICARE

## 2020-10-07 VITALS
DIASTOLIC BLOOD PRESSURE: 76 MMHG | OXYGEN SATURATION: 97 % | RESPIRATION RATE: 18 BRPM | HEART RATE: 68 BPM | BODY MASS INDEX: 37.25 KG/M2 | WEIGHT: 197.3 LBS | HEIGHT: 61 IN | SYSTOLIC BLOOD PRESSURE: 132 MMHG | TEMPERATURE: 97.2 F

## 2020-10-07 LAB
A/G RATIO: 1.8 (ref 0.8–2)
ALBUMIN SERPL-MCNC: 4.5 G/DL (ref 3.4–4.8)
ALP BLD-CCNC: 67 U/L (ref 25–100)
ALT SERPL-CCNC: 10 U/L (ref 4–36)
ANION GAP SERPL CALCULATED.3IONS-SCNC: 9 MMOL/L (ref 3–16)
AST SERPL-CCNC: 14 U/L (ref 8–33)
BILIRUB SERPL-MCNC: 0.4 MG/DL (ref 0.3–1.2)
BUN BLDV-MCNC: 17 MG/DL (ref 6–20)
CALCIUM SERPL-MCNC: 10.5 MG/DL (ref 8.5–10.5)
CHLORIDE BLD-SCNC: 102 MMOL/L (ref 98–107)
CO2: 28 MMOL/L (ref 20–30)
CREAT SERPL-MCNC: 1 MG/DL (ref 0.4–1.2)
GFR AFRICAN AMERICAN: >59
GFR NON-AFRICAN AMERICAN: 54
GLOBULIN: 2.5 G/DL
GLUCOSE BLD-MCNC: 121 MG/DL (ref 74–106)
HCT VFR BLD CALC: 40.5 % (ref 37–47)
HEMOGLOBIN: 12.8 G/DL (ref 11.5–16.5)
MCH RBC QN AUTO: 30.6 PG (ref 27–32)
MCHC RBC AUTO-ENTMCNC: 31.6 G/DL (ref 31–35)
MCV RBC AUTO: 96.9 FL (ref 80–100)
PDW BLD-RTO: 12.3 % (ref 11–16)
PLATELET # BLD: 187 K/UL (ref 150–400)
PMV BLD AUTO: 11.6 FL (ref 6–10)
POTASSIUM SERPL-SCNC: 4.4 MMOL/L (ref 3.4–5.1)
RBC # BLD: 4.18 M/UL (ref 3.8–5.8)
SODIUM BLD-SCNC: 139 MMOL/L (ref 136–145)
TOTAL PROTEIN: 7 G/DL (ref 6.4–8.3)
WBC # BLD: 5.6 K/UL (ref 4–11)

## 2020-10-07 PROCEDURE — 1090F PRES/ABSN URINE INCON ASSESS: CPT | Performed by: FAMILY MEDICINE

## 2020-10-07 PROCEDURE — 90688 IIV4 VACCINE SPLT 0.5 ML IM: CPT | Performed by: FAMILY MEDICINE

## 2020-10-07 PROCEDURE — 1036F TOBACCO NON-USER: CPT | Performed by: FAMILY MEDICINE

## 2020-10-07 PROCEDURE — 85027 COMPLETE CBC AUTOMATED: CPT

## 2020-10-07 PROCEDURE — G8417 CALC BMI ABV UP PARAM F/U: HCPCS | Performed by: FAMILY MEDICINE

## 2020-10-07 PROCEDURE — G0008 ADMIN INFLUENZA VIRUS VAC: HCPCS | Performed by: FAMILY MEDICINE

## 2020-10-07 PROCEDURE — G8482 FLU IMMUNIZE ORDER/ADMIN: HCPCS | Performed by: FAMILY MEDICINE

## 2020-10-07 PROCEDURE — G8399 PT W/DXA RESULTS DOCUMENT: HCPCS | Performed by: FAMILY MEDICINE

## 2020-10-07 PROCEDURE — G8427 DOCREV CUR MEDS BY ELIG CLIN: HCPCS | Performed by: FAMILY MEDICINE

## 2020-10-07 PROCEDURE — 99214 OFFICE O/P EST MOD 30 MIN: CPT | Performed by: FAMILY MEDICINE

## 2020-10-07 PROCEDURE — 1123F ACP DISCUSS/DSCN MKR DOCD: CPT | Performed by: FAMILY MEDICINE

## 2020-10-07 PROCEDURE — 3017F COLORECTAL CA SCREEN DOC REV: CPT | Performed by: FAMILY MEDICINE

## 2020-10-07 PROCEDURE — 4040F PNEUMOC VAC/ADMIN/RCVD: CPT | Performed by: FAMILY MEDICINE

## 2020-10-07 PROCEDURE — 80053 COMPREHEN METABOLIC PANEL: CPT

## 2020-10-07 RX ORDER — HYDROCODONE BITARTRATE AND ACETAMINOPHEN 5; 325 MG/1; MG/1
1 TABLET ORAL EVERY 6 HOURS PRN
Qty: 120 TABLET | Refills: 0 | Status: SHIPPED | OUTPATIENT
Start: 2020-10-07 | End: 2020-11-02 | Stop reason: SDUPTHER

## 2020-10-07 ASSESSMENT — ENCOUNTER SYMPTOMS
COUGH: 1
SHORTNESS OF BREATH: 1
WHEEZING: 1

## 2020-10-07 NOTE — PROGRESS NOTES
Chief Complaint   Patient presents with    COPD     f/u    Leg Swelling    Foot Swelling       Have you seen any other physician or provider since your last visit yes - [facility], over feet and legs swelling and hurting    Have you had any other diagnostic tests since your last visit? no    Have you changed or stopped any medications since your last visit? no     I have recommended that this patient have a immunization for Shingles, tdap but she declines at this time. I have discussed the risks and benefits of this examination with her. The patient verbalizes understanding. Diabetic retinal exam completed this year? Yes-[employer]                       * If yes please have patient sign a records release to obtain record to update Health Maintenance                       * If no, please order referral for patient to be scheduled         Health Maintenance Due This Visit   Colonoscopy No   Mammogram No   Annual Wellness Visit Yes   Microalbumin No   HgbA1C No   Diabetic Eye Exam No    House Bill One Due This Visit   RONEL No   UDS No   Contract No  Vaccine Information Sheet, \"Influenza - Inactivated\"  given to [employer], or parent/legal guardian of  [employer] and verbalized understanding. Patient responses:    Have you ever had a reaction to a flu vaccine? No  Do you have any current illness? No  Have you ever had Guillian Girard Syndrome? No  Do you have a serious allergy to any of the follow: Neomycin, Polymyxin, Thimerosal, eggs or egg products? No    Flu vaccine given per order. Please see immunization tab. Risks and benefits explained. Current VIS given.         Immunizations Administered     Name Date Dose Route    Influenza, Quadv, IM, (6 mo and older Fluzone, Flulaval, Fluarix and 3 yrs and older Afluria) 10/7/2020 0.5 mL Intramuscular    Site: Deltoid- Right    Lot: [id number]    NDC: 14278-216-93

## 2020-10-07 NOTE — PROGRESS NOTES
reviewed and are negative. OBJECTIVE:  /76   Pulse 68   Temp 97.2 °F (36.2 °C) (Temporal)   Resp 18   Ht 5' 1\" (1.549 m)   Wt 197 lb 4.8 oz (89.5 kg)   SpO2 97% Comment: room air  BMI 37.28 kg/m²    Wt Readings from Last 3 Encounters:   10/07/20 197 lb 4.8 oz (89.5 kg)   20 200 lb (90.7 kg)   20 199 lb (90.3 kg)     BP Readings from Last 3 Encounters:   10/07/20 132/76   20 132/70   20 138/78      Pulse Readings from Last 3 Encounters:   10/07/20 68   20 64   20 79     Body mass index is 37.28 kg/m². Resp Readings from Last 3 Encounters:   10/07/20 18   20 20   20 18     Past medical, surgical, family and social history were reviewed and updated with the patient. Physical Exam  Vitals signs and nursing note reviewed. Constitutional:       Appearance: Normal appearance. She is well-developed. HENT:      Head: Normocephalic. Nose: Nose normal.   Neck:      Musculoskeletal: Normal range of motion and neck supple. Cardiovascular:      Rate and Rhythm: Normal rate and regular rhythm. Heart sounds: Normal heart sounds, S1 normal and S2 normal. No murmur. No friction rub. No gallop. Pulmonary:      Effort: Pulmonary effort is normal.      Breath sounds: Normal breath sounds. Musculoskeletal:      Right knee: She exhibits decreased range of motion. Tenderness found. Medial joint line and lateral joint line tenderness noted. Left knee: She exhibits decreased range of motion. Tenderness found. Medial joint line and lateral joint line tenderness noted. Thoracic back: She exhibits decreased range of motion and tenderness. Lumbar back: She exhibits decreased range of motion, tenderness, pain and spasm. Right lower le+ Pitting Edema present. Left lower le+ Pitting Edema present. Skin:     General: Skin is warm and dry.    Neurological:      Mental Status: She is alert and oriented to person, place, and QUADV, 3 YRS AND OLDER, IM, MDV, 0.5ML ([place] Fraction)   3. Chronic pain of left knee  HYDROcodone-acetaminophen (NORCO) 5-325 MG per tablet   4. Rheumatoid arthritis with positive rheumatoid factor, involving unspecified site (HCC)  HYDROcodone-acetaminophen (NORCO) 5-325 MG per tablet   5. Osteoporosis without current pathological fracture, unspecified osteoporosis type  HYDROcodone-acetaminophen (NORCO) 5-325 MG per tablet   6. Chronic left-sided low back pain with left-sided sciatica  HYDROcodone-acetaminophen (NORCO) 5-325 MG per tablet        PLAN:  Orders Placed This Encounter   Medications    HYDROcodone-acetaminophen (NORCO) 5-325 MG per tablet     Sig: Take 1 tablet by mouth every 6 hours as needed for Pain for up to 30 days. Intended supply: 30 days     Dispense:  120 tablet     Refill:  0     Reduce doses taken as pain becomes manageable      I advised her to take the Lasix at 1 in the morning and 1 in the afternoon about 6 hours apart with the potassium for the next 5 days and then go back to once a day in the morning  Medications Discontinued During This Encounter   Medication Reason    HYDROcodone-acetaminophen ([address]) 7.5-325 MG per tablet LIST CLEANUP       Controlled Substances Monitoring:      Please note: This chart was generated using Dragon dictation software. Although every effort was made to ensure the accuracy of this automated transcription, some errors in transcription may have occurred.

## 2020-10-08 RX ORDER — POTASSIUM CHLORIDE 750 MG/1
TABLET, EXTENDED RELEASE ORAL
Qty: 30 TABLET | Refills: 1 | Status: SHIPPED | OUTPATIENT
Start: 2020-10-08 | End: 2020-10-16

## 2020-10-08 RX ORDER — FUROSEMIDE 20 MG/1
TABLET ORAL
Qty: 30 TABLET | Refills: 1 | Status: SHIPPED | OUTPATIENT
Start: 2020-10-08 | End: 2020-11-02 | Stop reason: SDUPTHER

## 2020-10-08 NOTE — TELEPHONE ENCOUNTER
Refill request received from pharmacy.  Medication pending for approval.       Last Office Visit With PCP:  10/7/2020    Next Visit Date:  Future Appointments   Date Time Provider Ann Silverio   10/16/2020  9:15 AM Eros Hancock MD [address] [place] [address]   11/5/2020  1:00 PM Eros Hancock MD [address] [place] [address]

## 2020-10-16 ENCOUNTER — VIRTUAL VISIT (OUTPATIENT)
Dept: FAMILY MEDICINE CLINIC | Age: 75
End: 2020-10-16
Payer: MEDICARE

## 2020-10-16 PROCEDURE — G8399 PT W/DXA RESULTS DOCUMENT: HCPCS | Performed by: FAMILY MEDICINE

## 2020-10-16 PROCEDURE — 4040F PNEUMOC VAC/ADMIN/RCVD: CPT | Performed by: FAMILY MEDICINE

## 2020-10-16 PROCEDURE — 99214 OFFICE O/P EST MOD 30 MIN: CPT | Performed by: FAMILY MEDICINE

## 2020-10-16 PROCEDURE — G8427 DOCREV CUR MEDS BY ELIG CLIN: HCPCS | Performed by: FAMILY MEDICINE

## 2020-10-16 PROCEDURE — 1090F PRES/ABSN URINE INCON ASSESS: CPT | Performed by: FAMILY MEDICINE

## 2020-10-16 PROCEDURE — 1123F ACP DISCUSS/DSCN MKR DOCD: CPT | Performed by: FAMILY MEDICINE

## 2020-10-16 PROCEDURE — 3017F COLORECTAL CA SCREEN DOC REV: CPT | Performed by: FAMILY MEDICINE

## 2020-10-16 RX ORDER — SPIRONOLACTONE 25 MG/1
25 TABLET ORAL DAILY
Qty: 30 TABLET | Refills: 5 | Status: SHIPPED | OUTPATIENT
Start: 2020-10-16 | End: 2021-04-12

## 2020-10-16 ASSESSMENT — ENCOUNTER SYMPTOMS
WHEEZING: 1
SHORTNESS OF BREATH: 1
COUGH: 1

## 2020-10-16 NOTE — PROGRESS NOTES
SUBJECTIVE:    Patient ID: Shantanu Ulloa is a 70 y.o. female. Chief Complaint   Patient presents with    Back Pain       HPI: video visit  Video visit today with her about her back pain. She is really struggling quite a bit with her discomfort. She says she is having more difficulty with her legs hurting. She is having more swelling of her feet. She has had to take some pain medicine which she does not want to do. She says that she is trying to help take care of her  who is got cancer. He is got multiple doctor visits. She says she is trying to keep on her feet. She says she is frustrated with her depressive symptoms. Review of Systems   Constitutional: Positive for fatigue. Respiratory: Positive for cough, shortness of breath and wheezing. Musculoskeletal: Positive for gait problem. Psychiatric/Behavioral: Positive for agitation. The patient is nervous/anxious. All other systems reviewed and are negative. OBJECTIVE:  There were no vitals taken for this visit. Wt Readings from Last 3 Encounters:   11/02/20 201 lb 9.6 oz (91.4 kg)   10/20/20 199 lb 3.2 oz (90.4 kg)   10/07/20 197 lb 4.8 oz (89.5 kg)     BP Readings from Last 3 Encounters:   11/02/20 138/72   10/20/20 122/80   10/07/20 132/76      Pulse Readings from Last 3 Encounters:   11/02/20 74   10/20/20 64   10/07/20 68     There is no height or weight on file to calculate BMI. Resp Readings from Last 3 Encounters:   11/02/20 18   10/20/20 20   10/07/20 18     Past medical, surgical, family and social history were reviewed and updated with the patient. Physical Exam  Vitals signs and nursing note reviewed. Constitutional:       Appearance: Normal appearance. She is well-developed. HENT:      Head: Normocephalic. Nose: Nose normal.   Neck:      Musculoskeletal: Normal range of motion and neck supple. Cardiovascular:      Rate and Rhythm: Normal rate and regular rhythm.       Heart sounds: Normal heart sounds, S1 normal and S2 normal. No murmur. No friction rub. No gallop. Pulmonary:      Effort: Pulmonary effort is normal.      Breath sounds: Normal breath sounds. Musculoskeletal:      Right knee: She exhibits decreased range of motion. Tenderness found. Medial joint line and lateral joint line tenderness noted. Left knee: She exhibits decreased range of motion. Tenderness found. Medial joint line and lateral joint line tenderness noted. Thoracic back: She exhibits decreased range of motion and tenderness. Lumbar back: She exhibits decreased range of motion, tenderness, pain and spasm. Right lower le+ Pitting Edema present. Left lower le+ Pitting Edema present. Skin:     General: Skin is warm. Neurological:      Mental Status: She is alert and oriented to person, place, and time. Psychiatric:         Attention and Perception: Attention normal.         Mood and Affect: Mood normal.         Speech: Speech normal.         Behavior: Behavior is agitated. Thought Content:  Thought content normal.         Cognition and Memory: Cognition normal.         Judgment: Judgment normal.          Results in Past 30 Days  Result Component Current Result Ref Range Previous Result Ref Range   Alb 4.4 (2020) 3.4 - 4.8 g/dL 4.4 (10/20/2020) 3.4 - 4.8 g/dL   Albumin/Globulin Ratio 1.5 (2020) 0.8 - 2.0 1.6 (10/20/2020) 0.8 - 2.0   Alkaline Phosphatase 73 (2020) 25 - 100 U/L 67 (10/20/2020) 25 - 100 U/L   ALT 28 (2020) 4 - 36 U/L 12 (10/20/2020) 4 - 36 U/L   AST 23 (2020) 8 - 33 U/L 15 (10/20/2020) 8 - 33 U/L   BUN 16 (2020) 6 - 20 mg/dL 21 (H) (10/20/2020) 6 - 20 mg/dL   Calcium 10.1 (2020) 8.5 - 10.5 mg/dL 9.5 (10/20/2020) 8.5 - 10.5 mg/dL   Chloride 100 (2020) 98 - 107 mmol/L 103 (10/20/2020) 98 - 107 mmol/L   CO2 29 (2020) 20 - 30 mmol/L 28 (10/20/2020) 20 - 30 mmol/L   CREATININE 1.1 (2020) 0.4 - 1.2 mg/dL 1.0 (10/20/2020) 0.4 - 1.2 mg/dL   GFR African American 59 (L) (11/2/2020) >59 >59 (10/20/2020) >59   GFR Non- 49 (L) (11/2/2020) >59 54 (L) (10/20/2020) >59   Globulin 2.9 (11/2/2020) g/dL 2.7 (10/20/2020) g/dL   Glucose 88 (11/2/2020) 74 - 106 mg/dL 96 (10/20/2020) 74 - 106 mg/dL   Potassium 3.8 (11/2/2020) 3.4 - 5.1 mmol/L 4.3 (10/20/2020) 3.4 - 5.1 mmol/L   Sodium 139 (11/2/2020) 136 - 145 mmol/L 140 (10/20/2020) 136 - 145 mmol/L   Total Bilirubin 0.4 (11/2/2020) 0.3 - 1.2 mg/dL 0.3 (10/20/2020) 0.3 - 1.2 mg/dL   Total Protein 7.3 (11/2/2020) 6.4 - 8.3 g/dL 7.1 (10/20/2020) 6.4 - 8.3 g/dL     LDL Calculated (mg/dL)   Date Value   08/06/2020 72       Lab Results   Component Value Date    WBC 6.0 11/02/2020    NEUTROABS 4.3 07/17/2017    HGB 12.9 11/02/2020    HCT 42.6 11/02/2020    MCV 98.6 11/02/2020     11/02/2020     Lab Results   Component Value Date    TSH 2.83 08/06/2020       ASSESSMENT:    Diagnosis Orders   1. Edema, unspecified type  XR CHEST STANDARD (2 VW)   2. Chronic left-sided low back pain with left-sided sciatica     3. Rheumatoid arthritis with positive rheumatoid factor, involving unspecified site ([facility]     4. Depression, unspecified depression type          PLAN:  Orders Placed This Encounter   Medications    spironolactone (ALDACTONE) 25 MG tablet     Sig: Take 1 tablet by mouth daily     Dispense:  30 tablet     Refill:  5        Medications Discontinued During This Encounter   Medication Reason    potassium chloride (KLOR-CON M) 10 MEQ extended release tablet        Controlled Substances Monitoring:      Please note: This chart was generated using Dragon dictation software. Although every effort was made to ensure the accuracy of this automated transcription, some errors in transcription may have occurred.

## 2020-10-20 ENCOUNTER — TELEPHONE (OUTPATIENT)
Dept: FAMILY MEDICINE CLINIC | Age: 75
End: 2020-10-20

## 2020-10-20 ENCOUNTER — HOSPITAL ENCOUNTER (OUTPATIENT)
Facility: HOSPITAL | Age: 75
Discharge: HOME | End: 2020-10-20
Payer: MEDICARE

## 2020-10-20 ENCOUNTER — OFFICE VISIT (OUTPATIENT)
Dept: FAMILY MEDICINE CLINIC | Age: 75
End: 2020-10-20
Payer: MEDICARE

## 2020-10-20 VITALS
HEIGHT: 61 IN | DIASTOLIC BLOOD PRESSURE: 80 MMHG | WEIGHT: 199.2 LBS | OXYGEN SATURATION: 93 % | RESPIRATION RATE: 20 BRPM | SYSTOLIC BLOOD PRESSURE: 122 MMHG | HEART RATE: 64 BPM | TEMPERATURE: 96.9 F | BODY MASS INDEX: 37.61 KG/M2

## 2020-10-20 LAB
A/G RATIO: 1.6 (ref 0.8–2)
ALBUMIN SERPL-MCNC: 4.4 G/DL (ref 3.4–4.8)
ALP BLD-CCNC: 67 U/L (ref 25–100)
ALT SERPL-CCNC: 12 U/L (ref 4–36)
ANION GAP SERPL CALCULATED.3IONS-SCNC: 9 MMOL/L (ref 3–16)
AST SERPL-CCNC: 15 U/L (ref 8–33)
BILIRUB SERPL-MCNC: 0.3 MG/DL (ref 0.3–1.2)
BILIRUBIN, POC: NORMAL
BLOOD URINE, POC: NORMAL
BUN BLDV-MCNC: 21 MG/DL (ref 6–20)
CALCIUM SERPL-MCNC: 9.5 MG/DL (ref 8.5–10.5)
CHLORIDE BLD-SCNC: 103 MMOL/L (ref 98–107)
CLARITY, POC: CLEAR
CO2: 28 MMOL/L (ref 20–30)
COLOR, POC: YELLOW
CREAT SERPL-MCNC: 1 MG/DL (ref 0.4–1.2)
GFR AFRICAN AMERICAN: >59
GFR NON-AFRICAN AMERICAN: 54
GLOBULIN: 2.7 G/DL
GLUCOSE BLD-MCNC: 96 MG/DL (ref 74–106)
GLUCOSE URINE, POC: NORMAL
KETONES, POC: NORMAL
LEUKOCYTE EST, POC: NORMAL
NITRITE, POC: NORMAL
PH, POC: 5.5
POTASSIUM SERPL-SCNC: 4.3 MMOL/L (ref 3.4–5.1)
PROTEIN, POC: NORMAL
SODIUM BLD-SCNC: 140 MMOL/L (ref 136–145)
SPECIFIC GRAVITY, POC: >1.03
TOTAL PROTEIN: 7.1 G/DL (ref 6.4–8.3)
UROBILINOGEN, POC: 0.2

## 2020-10-20 PROCEDURE — 4040F PNEUMOC VAC/ADMIN/RCVD: CPT | Performed by: FAMILY MEDICINE

## 2020-10-20 PROCEDURE — 1123F ACP DISCUSS/DSCN MKR DOCD: CPT | Performed by: FAMILY MEDICINE

## 2020-10-20 PROCEDURE — G8399 PT W/DXA RESULTS DOCUMENT: HCPCS | Performed by: FAMILY MEDICINE

## 2020-10-20 PROCEDURE — G8482 FLU IMMUNIZE ORDER/ADMIN: HCPCS | Performed by: FAMILY MEDICINE

## 2020-10-20 PROCEDURE — G8417 CALC BMI ABV UP PARAM F/U: HCPCS | Performed by: FAMILY MEDICINE

## 2020-10-20 PROCEDURE — 1090F PRES/ABSN URINE INCON ASSESS: CPT | Performed by: FAMILY MEDICINE

## 2020-10-20 PROCEDURE — 3017F COLORECTAL CA SCREEN DOC REV: CPT | Performed by: FAMILY MEDICINE

## 2020-10-20 PROCEDURE — 81002 URINALYSIS NONAUTO W/O SCOPE: CPT | Performed by: FAMILY MEDICINE

## 2020-10-20 PROCEDURE — 99214 OFFICE O/P EST MOD 30 MIN: CPT | Performed by: FAMILY MEDICINE

## 2020-10-20 PROCEDURE — 80053 COMPREHEN METABOLIC PANEL: CPT

## 2020-10-20 PROCEDURE — 93000 ELECTROCARDIOGRAM COMPLETE: CPT | Performed by: FAMILY MEDICINE

## 2020-10-20 PROCEDURE — G8427 DOCREV CUR MEDS BY ELIG CLIN: HCPCS | Performed by: FAMILY MEDICINE

## 2020-10-20 PROCEDURE — 1036F TOBACCO NON-USER: CPT | Performed by: FAMILY MEDICINE

## 2020-10-20 RX ORDER — BENZONATATE 200 MG/1
200 CAPSULE ORAL 3 TIMES DAILY PRN
Qty: 30 CAPSULE | Refills: 2 | Status: SHIPPED | OUTPATIENT
Start: 2020-10-20 | End: 2020-10-27

## 2020-10-20 ASSESSMENT — PATIENT HEALTH QUESTIONNAIRE - PHQ9
2. FEELING DOWN, DEPRESSED OR HOPELESS: 0
SUM OF ALL RESPONSES TO PHQ9 QUESTIONS 1 & 2: 0
SUM OF ALL RESPONSES TO PHQ QUESTIONS 1-9: 0
1. LITTLE INTEREST OR PLEASURE IN DOING THINGS: 0
SUM OF ALL RESPONSES TO PHQ QUESTIONS 1-9: 0
SUM OF ALL RESPONSES TO PHQ QUESTIONS 1-9: 0

## 2020-10-20 ASSESSMENT — ENCOUNTER SYMPTOMS
WHEEZING: 1
COUGH: 1
SHORTNESS OF BREATH: 1

## 2020-10-20 NOTE — LETTER
[address] [place] [address]. [address] [place] [address]  Phone: [phone]  Fax: [phone]    Heraclio Bloom MD        October 26, 2020 [address] [facility]      Dear Ca Esquivel:    Your recent lab results were all within normal range. No medication changes needed at this time. If you have any questions or concerns, please don't hesitate to call.     Sincerely,        Heraclio Bloom MD

## 2020-10-20 NOTE — TELEPHONE ENCOUNTER
Patient's referral, along with all pertinent medical records faxed to the attention of Dr. Luis Jeans (Cardiology) on 10/20/20, they will contact the patient and our office with appointment date/time/location.

## 2020-10-20 NOTE — PROGRESS NOTES
SUBJECTIVE:    Patient ID: Seth Coates is a 70 y.o. female. Chief Complaint   Patient presents with    Edema     BLE    Leg Pain     Bilateral     Shoulder Pain     Right    Wheezing       HPI: office visit  She is still having swelling. She really cannot see much difference in the change in the fluid pill. She says she is taking them just like she supposed to. She is still having a little reflux. She says is not too bad. She is having a lot of right shoulder pain. She is not really sure why that is. She is having more wheezing. She does have constant shortness of breath though she is more wheezy today but it is raining pretty significantly outside today. She says she is having a lot of pain in these legs whether swelling so much. She denies any true chest pain though she does have some pressure at times. She has been under tremendous amount of stress. She is taking care of herself the best she can with her  and his significant illness. She does feel like that she is not urinating as much as she should be. Review of Systems   Constitutional: Positive for fatigue. Respiratory: Positive for cough, shortness of breath and wheezing. Musculoskeletal: Positive for gait problem. Psychiatric/Behavioral: Positive for agitation. The patient is nervous/anxious. All other systems reviewed and are negative. OBJECTIVE:  /80   Pulse 64   Temp 96.9 °F (36.1 °C) (Infrared)   Resp 20   Ht 5' 1\" (1.549 m)   Wt 199 lb 3.2 oz (90.4 kg)   SpO2 93% Comment: Room Air  Breastfeeding No   BMI 37.64 kg/m²    Wt Readings from Last 3 Encounters:   10/20/20 199 lb 3.2 oz (90.4 kg)   10/07/20 197 lb 4.8 oz (89.5 kg)   09/09/20 200 lb (90.7 kg)     BP Readings from Last 3 Encounters:   10/20/20 122/80   10/07/20 132/76   09/09/20 132/70      Pulse Readings from Last 3 Encounters:   10/20/20 64   10/07/20 68   09/09/20 64     Body mass index is 37.64 kg/m².    Resp Readings from Last 3 (10/20/2020) 25 - 100 U/L 67 (10/7/2020) 25 - 100 U/L   ALT 12 (10/20/2020) 4 - 36 U/L 10 (10/7/2020) 4 - 36 U/L   AST 15 (10/20/2020) 8 - 33 U/L 14 (10/7/2020) 8 - 33 U/L   BUN 21 (H) (10/20/2020) 6 - 20 mg/dL 17 (10/7/2020) 6 - 20 mg/dL   Calcium 9.5 (10/20/2020) 8.5 - 10.5 mg/dL 10.5 (10/7/2020) 8.5 - 10.5 mg/dL   Chloride 103 (10/20/2020) 98 - 107 mmol/L 102 (10/7/2020) 98 - 107 mmol/L   CO2 28 (10/20/2020) 20 - 30 mmol/L 28 (10/7/2020) 20 - 30 mmol/L   CREATININE 1.0 (10/20/2020) 0.4 - 1.2 mg/dL 1.0 (10/7/2020) 0.4 - 1.2 mg/dL   GFR  >59 (10/20/2020) >59 >59 (10/7/2020) >59   GFR Non- 54 (L) (10/20/2020) >59 54 (L) (10/7/2020) >59   Globulin 2.7 (10/20/2020) g/dL 2.5 (10/7/2020) g/dL   Glucose 96 (10/20/2020) 74 - 106 mg/dL 121 (H) (10/7/2020) 74 - 106 mg/dL   Potassium 4.3 (10/20/2020) 3.4 - 5.1 mmol/L 4.4 (10/7/2020) 3.4 - 5.1 mmol/L   Sodium 140 (10/20/2020) 136 - 145 mmol/L 139 (10/7/2020) 136 - 145 mmol/L   Total Bilirubin 0.3 (10/20/2020) 0.3 - 1.2 mg/dL 0.4 (10/7/2020) 0.3 - 1.2 mg/dL   Total Protein 7.1 (10/20/2020) 6.4 - 8.3 g/dL 7.0 (10/7/2020) 6.4 - 8.3 g/dL     LDL Calculated (mg/dL)   Date Value   08/06/2020 72       Lab Results   Component Value Date    WBC 5.6 10/07/2020    NEUTROABS 4.3 07/17/2017    HGB 12.8 10/07/2020    HCT 40.5 10/07/2020    MCV 96.9 10/07/2020     10/07/2020     Lab Results   Component Value Date    TSH 2.83 08/06/2020       ASSESSMENT:    Diagnosis Orders   1. Edema, unspecified type  EKG 12 Lead    COMPREHENSIVE METABOLIC PANEL    POCT Urinalysis no Micro    External Referral To Cardiology   2. SOB (shortness of breath)  EKG 12 Lead    External Referral To Cardiology   3. Urinary retention  US Abdomen Complete   4.  Essential hypertension  External Referral To Cardiology        PLAN:  Orders Placed This Encounter   Medications    benzonatate (TESSALON) 200 MG capsule     Sig: Take 1 capsule by mouth 3 times daily as needed for Cough Dispense:  30 capsule     Refill:  2        There are no discontinued medications. Controlled Substances Monitoring:      Please note: This chart was generated using Dragon dictation software. Although every effort was made to ensure the accuracy of this automated transcription, some errors in transcription may have occurred.

## 2020-10-21 NOTE — TELEPHONE ENCOUNTER
Patient scheduled to see Dr. Dora Agustin (Cardiology) on 11/03/20 at 10:45. Patient's referral, along with all pertinent medical records faxed to the attention of scheduling on 10/20/20. Patient contacted advised of appointment date/time/location. Patient verbalized understanding of all instructions.  (Per Cardiology)

## 2020-10-26 ENCOUNTER — HOSPITAL ENCOUNTER (OUTPATIENT)
Dept: ULTRASOUND IMAGING | Facility: HOSPITAL | Age: 75
Discharge: HOME | End: 2020-10-26
Payer: MEDICARE

## 2020-10-26 PROCEDURE — 76770 US EXAM ABDO BACK WALL COMP: CPT

## 2020-10-28 ENCOUNTER — HOSPITAL ENCOUNTER (OUTPATIENT)
Facility: HOSPITAL | Age: 75
Discharge: HOME | End: 2020-10-28
Payer: MEDICARE

## 2020-10-28 ENCOUNTER — HOSPITAL ENCOUNTER (OUTPATIENT)
Dept: GENERAL RADIOLOGY | Facility: HOSPITAL | Age: 75
Discharge: HOME | End: 2020-10-28
Payer: MEDICARE

## 2020-10-28 PROCEDURE — 73030 X-RAY EXAM OF SHOULDER: CPT

## 2020-11-02 ENCOUNTER — HOSPITAL ENCOUNTER (OUTPATIENT)
Facility: HOSPITAL | Age: 75
Discharge: HOME | End: 2020-11-02
Payer: MEDICARE

## 2020-11-02 ENCOUNTER — OFFICE VISIT (OUTPATIENT)
Dept: FAMILY MEDICINE CLINIC | Age: 75
End: 2020-11-02
Payer: MEDICARE

## 2020-11-02 VITALS
RESPIRATION RATE: 18 BRPM | HEART RATE: 74 BPM | WEIGHT: 201.6 LBS | SYSTOLIC BLOOD PRESSURE: 138 MMHG | TEMPERATURE: 97.5 F | OXYGEN SATURATION: 97 % | DIASTOLIC BLOOD PRESSURE: 72 MMHG | BODY MASS INDEX: 38.06 KG/M2 | HEIGHT: 61 IN

## 2020-11-02 LAB
A/G RATIO: 1.5 (ref 0.8–2)
ALBUMIN SERPL-MCNC: 4.4 G/DL (ref 3.4–4.8)
ALP BLD-CCNC: 73 U/L (ref 25–100)
ALT SERPL-CCNC: 28 U/L (ref 4–36)
ANION GAP SERPL CALCULATED.3IONS-SCNC: 10 MMOL/L (ref 3–16)
AST SERPL-CCNC: 23 U/L (ref 8–33)
BILIRUB SERPL-MCNC: 0.4 MG/DL (ref 0.3–1.2)
BUN BLDV-MCNC: 16 MG/DL (ref 6–20)
C-REACTIVE PROTEIN: 7.9 MG/L (ref 0–5.1)
CALCIUM SERPL-MCNC: 10.1 MG/DL (ref 8.5–10.5)
CHLORIDE BLD-SCNC: 100 MMOL/L (ref 98–107)
CO2: 29 MMOL/L (ref 20–30)
CREAT SERPL-MCNC: 1.1 MG/DL (ref 0.4–1.2)
GFR AFRICAN AMERICAN: 59
GFR NON-AFRICAN AMERICAN: 49
GLOBULIN: 2.9 G/DL
GLUCOSE BLD-MCNC: 88 MG/DL (ref 74–106)
HCT VFR BLD CALC: 42.6 % (ref 37–47)
HEMOGLOBIN: 12.9 G/DL (ref 11.5–16.5)
MCH RBC QN AUTO: 29.9 PG (ref 27–32)
MCHC RBC AUTO-ENTMCNC: 30.3 G/DL (ref 31–35)
MCV RBC AUTO: 98.6 FL (ref 80–100)
PDW BLD-RTO: 12.8 % (ref 11–16)
PLATELET # BLD: 229 K/UL (ref 150–400)
PMV BLD AUTO: 11.3 FL (ref 6–10)
POTASSIUM SERPL-SCNC: 3.8 MMOL/L (ref 3.4–5.1)
RBC # BLD: 4.32 M/UL (ref 3.8–5.8)
SEDIMENTATION RATE, ERYTHROCYTE: 23 MM/HR (ref 0–30)
SODIUM BLD-SCNC: 139 MMOL/L (ref 136–145)
TOTAL PROTEIN: 7.3 G/DL (ref 6.4–8.3)
WBC # BLD: 6 K/UL (ref 4–11)

## 2020-11-02 PROCEDURE — 1090F PRES/ABSN URINE INCON ASSESS: CPT | Performed by: FAMILY MEDICINE

## 2020-11-02 PROCEDURE — 85302 CLOT INHIBIT PROT C ANTIGEN: CPT

## 2020-11-02 PROCEDURE — 86140 C-REACTIVE PROTEIN: CPT

## 2020-11-02 PROCEDURE — 85652 RBC SED RATE AUTOMATED: CPT

## 2020-11-02 PROCEDURE — G8417 CALC BMI ABV UP PARAM F/U: HCPCS | Performed by: FAMILY MEDICINE

## 2020-11-02 PROCEDURE — 99214 OFFICE O/P EST MOD 30 MIN: CPT | Performed by: FAMILY MEDICINE

## 2020-11-02 PROCEDURE — 1036F TOBACCO NON-USER: CPT | Performed by: FAMILY MEDICINE

## 2020-11-02 PROCEDURE — G8399 PT W/DXA RESULTS DOCUMENT: HCPCS | Performed by: FAMILY MEDICINE

## 2020-11-02 PROCEDURE — 85305 CLOT INHIBIT PROT S TOTAL: CPT

## 2020-11-02 PROCEDURE — G8427 DOCREV CUR MEDS BY ELIG CLIN: HCPCS | Performed by: FAMILY MEDICINE

## 2020-11-02 PROCEDURE — 1123F ACP DISCUSS/DSCN MKR DOCD: CPT | Performed by: FAMILY MEDICINE

## 2020-11-02 PROCEDURE — 85027 COMPLETE CBC AUTOMATED: CPT

## 2020-11-02 PROCEDURE — 3017F COLORECTAL CA SCREEN DOC REV: CPT | Performed by: FAMILY MEDICINE

## 2020-11-02 PROCEDURE — 4040F PNEUMOC VAC/ADMIN/RCVD: CPT | Performed by: FAMILY MEDICINE

## 2020-11-02 PROCEDURE — 80053 COMPREHEN METABOLIC PANEL: CPT

## 2020-11-02 PROCEDURE — G8482 FLU IMMUNIZE ORDER/ADMIN: HCPCS | Performed by: FAMILY MEDICINE

## 2020-11-02 RX ORDER — FUROSEMIDE 20 MG/1
TABLET ORAL
Qty: 30 TABLET | Refills: 1 | Status: SHIPPED | OUTPATIENT
Start: 2020-11-02 | End: 2020-11-16 | Stop reason: SDUPTHER

## 2020-11-02 RX ORDER — HYDROCODONE BITARTRATE AND ACETAMINOPHEN 5; 325 MG/1; MG/1
1 TABLET ORAL EVERY 6 HOURS PRN
Qty: 120 TABLET | Refills: 0 | Status: SHIPPED | OUTPATIENT
Start: 2020-11-02 | End: 2020-11-16 | Stop reason: SDUPTHER

## 2020-11-02 RX ORDER — RIVAROXABAN 15 MG/1
15 TABLET, FILM COATED ORAL 2 TIMES DAILY
COMMUNITY
Start: 2020-10-29 | End: 2020-11-16

## 2020-11-02 RX ORDER — LORAZEPAM 0.5 MG/1
0.5 TABLET ORAL 2 TIMES DAILY PRN
Qty: 180 TABLET | Refills: 0 | Status: SHIPPED | OUTPATIENT
Start: 2020-11-02 | End: 2020-11-16 | Stop reason: SDUPTHER

## 2020-11-02 RX ORDER — TIOTROPIUM BROMIDE AND OLODATEROL 3.124; 2.736 UG/1; UG/1
2.5 SPRAY, METERED RESPIRATORY (INHALATION) DAILY
COMMUNITY
Start: 2020-10-23 | End: 2020-11-16 | Stop reason: SDUPTHER

## 2020-11-02 RX ORDER — BUDESONIDE AND FORMOTEROL FUMARATE DIHYDRATE 160; 4.5 UG/1; UG/1
2 AEROSOL RESPIRATORY (INHALATION) 2 TIMES DAILY
COMMUNITY
End: 2020-11-16 | Stop reason: SDUPTHER

## 2020-11-02 ASSESSMENT — ENCOUNTER SYMPTOMS
COUGH: 1
SHORTNESS OF BREATH: 1
WHEEZING: 1

## 2020-11-02 NOTE — PROGRESS NOTES
Chief Complaint   Patient presents with    Arm Pain     right arm has blood clot wednesday of last week       Have you seen any other physician or provider since your last visit yes - [facility]    Have you had any other diagnostic tests since your last visit? yes - U/S    Have you changed or stopped any medications since your last visit?  yes - Xarelto

## 2020-11-05 LAB
PROTEIN C ANTIGEN: >95 % (ref 63–153)
PROTEIN S ANTIGEN, TOTAL: 144 % (ref 63–126)

## 2020-11-11 ENCOUNTER — TELEPHONE (OUTPATIENT)
Dept: FAMILY MEDICINE CLINIC | Age: 75
End: 2020-11-11

## 2020-11-11 NOTE — TELEPHONE ENCOUNTER
Patient's referral, along with all pertinent medical records faxed to the attention of [facility] on 11/11/20, they will contact the patient and our office with appointment date/time/location.

## 2020-11-16 ENCOUNTER — VIRTUAL VISIT (OUTPATIENT)
Dept: FAMILY MEDICINE CLINIC | Age: 75
End: 2020-11-16
Payer: MEDICARE

## 2020-11-16 PROCEDURE — G8427 DOCREV CUR MEDS BY ELIG CLIN: HCPCS | Performed by: FAMILY MEDICINE

## 2020-11-16 PROCEDURE — 4040F PNEUMOC VAC/ADMIN/RCVD: CPT | Performed by: FAMILY MEDICINE

## 2020-11-16 PROCEDURE — 1123F ACP DISCUSS/DSCN MKR DOCD: CPT | Performed by: FAMILY MEDICINE

## 2020-11-16 PROCEDURE — 3017F COLORECTAL CA SCREEN DOC REV: CPT | Performed by: FAMILY MEDICINE

## 2020-11-16 PROCEDURE — G8399 PT W/DXA RESULTS DOCUMENT: HCPCS | Performed by: FAMILY MEDICINE

## 2020-11-16 PROCEDURE — G2025 DIS SITE TELE SVCS RHC/FQHC: HCPCS | Performed by: FAMILY MEDICINE

## 2020-11-16 PROCEDURE — 1090F PRES/ABSN URINE INCON ASSESS: CPT | Performed by: FAMILY MEDICINE

## 2020-11-16 RX ORDER — TIOTROPIUM BROMIDE AND OLODATEROL 3.124; 2.736 UG/1; UG/1
2.5 SPRAY, METERED RESPIRATORY (INHALATION) DAILY
Qty: 1 INHALER | Refills: 5 | Status: SHIPPED | OUTPATIENT
Start: 2020-11-16

## 2020-11-16 RX ORDER — HYDROCODONE BITARTRATE AND ACETAMINOPHEN 5; 325 MG/1; MG/1
1 TABLET ORAL EVERY 6 HOURS PRN
Qty: 120 TABLET | Refills: 0 | Status: SHIPPED | OUTPATIENT
Start: 2020-11-16 | End: 2020-12-16

## 2020-11-16 RX ORDER — POTASSIUM CHLORIDE 750 MG/1
10 TABLET, EXTENDED RELEASE ORAL DAILY
Qty: 60 TABLET | Refills: 3 | Status: SHIPPED | OUTPATIENT
Start: 2020-11-16

## 2020-11-16 RX ORDER — LORAZEPAM 0.5 MG/1
0.5 TABLET ORAL 2 TIMES DAILY PRN
Qty: 180 TABLET | Refills: 0 | Status: SHIPPED | OUTPATIENT
Start: 2020-11-16 | End: 2020-12-16

## 2020-11-16 RX ORDER — LOSARTAN POTASSIUM 100 MG/1
TABLET ORAL
Qty: 90 TABLET | Refills: 3 | Status: SHIPPED | OUTPATIENT
Start: 2020-11-16

## 2020-11-16 RX ORDER — ALBUTEROL SULFATE 2.5 MG/3ML
2.5 SOLUTION RESPIRATORY (INHALATION) EVERY 4 HOURS PRN
Qty: 120 VIAL | Refills: 5 | Status: SHIPPED | OUTPATIENT
Start: 2020-11-16

## 2020-11-16 RX ORDER — BUPROPION HYDROCHLORIDE 300 MG/1
300 TABLET ORAL EVERY MORNING
Qty: 30 TABLET | Refills: 2 | Status: SHIPPED | OUTPATIENT
Start: 2020-11-16

## 2020-11-16 RX ORDER — FUROSEMIDE 20 MG/1
TABLET ORAL
Qty: 30 TABLET | Refills: 1 | Status: SHIPPED | OUTPATIENT
Start: 2020-11-16

## 2020-11-16 RX ORDER — BUDESONIDE AND FORMOTEROL FUMARATE DIHYDRATE 160; 4.5 UG/1; UG/1
2 AEROSOL RESPIRATORY (INHALATION) 2 TIMES DAILY
Qty: 1 INHALER | Refills: 3 | Status: SHIPPED | OUTPATIENT
Start: 2020-11-16

## 2020-11-16 RX ORDER — POTASSIUM CHLORIDE 750 MG/1
10 TABLET, EXTENDED RELEASE ORAL DAILY
COMMUNITY
Start: 2020-11-03 | End: 2020-11-16 | Stop reason: SDUPTHER

## 2020-11-16 ASSESSMENT — ENCOUNTER SYMPTOMS
SHORTNESS OF BREATH: 1
COUGH: 1
WHEEZING: 1

## 2020-11-16 NOTE — PROGRESS NOTES
Chief Complaint   Patient presents with    Leg Swelling     extreme pain       Have you seen any other physician or provider since your last visit yes - [facility]     Have you had any other diagnostic tests since your last visit? yes - see report in media    Have you changed or stopped any medications since your last visit?  yes - anitbiotic

## 2020-11-16 NOTE — PROGRESS NOTES
updated with the patient. Physical Exam  Neurological:      Mental Status: She is alert and oriented to person, place, and time. Mental status is at baseline. Psychiatric:         Attention and Perception: Attention and perception normal.         Mood and Affect: Mood is anxious and depressed. Speech: Speech normal.         Behavior: Behavior normal. Behavior is cooperative. Thought Content:  Thought content normal.         Cognition and Memory: Cognition and memory normal.         Judgment: Judgment normal.         Results in Past 30 Days  Result Component Current Result Ref Range Previous Result Ref Range   Alb 4.4 (11/2/2020) 3.4 - 4.8 g/dL 4.4 (10/20/2020) 3.4 - 4.8 g/dL   Albumin/Globulin Ratio 1.5 (11/2/2020) 0.8 - 2.0 1.6 (10/20/2020) 0.8 - 2.0   Alkaline Phosphatase 73 (11/2/2020) 25 - 100 U/L 67 (10/20/2020) 25 - 100 U/L   ALT 28 (11/2/2020) 4 - 36 U/L 12 (10/20/2020) 4 - 36 U/L   AST 23 (11/2/2020) 8 - 33 U/L 15 (10/20/2020) 8 - 33 U/L   BUN 16 (11/2/2020) 6 - 20 mg/dL 21 (H) (10/20/2020) 6 - 20 mg/dL   Calcium 10.1 (11/2/2020) 8.5 - 10.5 mg/dL 9.5 (10/20/2020) 8.5 - 10.5 mg/dL   Chloride 100 (11/2/2020) 98 - 107 mmol/L 103 (10/20/2020) 98 - 107 mmol/L   CO2 29 (11/2/2020) 20 - 30 mmol/L 28 (10/20/2020) 20 - 30 mmol/L   CREATININE 1.1 (11/2/2020) 0.4 - 1.2 mg/dL 1.0 (10/20/2020) 0.4 - 1.2 mg/dL   GFR African American 59 (L) (11/2/2020) >59 >59 (10/20/2020) >59   GFR Non- 49 (L) (11/2/2020) >59 54 (L) (10/20/2020) >59   Globulin 2.9 (11/2/2020) g/dL 2.7 (10/20/2020) g/dL   Glucose 88 (11/2/2020) 74 - 106 mg/dL 96 (10/20/2020) 74 - 106 mg/dL   Potassium 3.8 (11/2/2020) 3.4 - 5.1 mmol/L 4.3 (10/20/2020) 3.4 - 5.1 mmol/L   Sodium 139 (11/2/2020) 136 - 145 mmol/L 140 (10/20/2020) 136 - 145 mmol/L   Total Bilirubin 0.4 (11/2/2020) 0.3 - 1.2 mg/dL 0.3 (10/20/2020) 0.3 - 1.2 mg/dL   Total Protein 7.3 (11/2/2020) 6.4 - 8.3 g/dL 7.1 (10/20/2020) 6.4 - 8.3 g/dL     LDL Calculated (mg/dL)   Date Value   08/06/2020 72       Lab Results   Component Value Date    WBC 6.0 11/02/2020    NEUTROABS 4.3 07/17/2017    HGB 12.9 11/02/2020    HCT 42.6 11/02/2020    MCV 98.6 11/02/2020     11/02/2020     Lab Results   Component Value Date    TSH 2.83 08/06/2020       ASSESSMENT:    Diagnosis Orders   1. Pedal edema     2. Chronic pain of left knee  HYDROcodone-acetaminophen (NORCO) 5-325 MG per tablet   3. Rheumatoid arthritis with positive rheumatoid factor, involving unspecified site (HCC)  HYDROcodone-acetaminophen (NORCO) 5-325 MG per tablet   4. Osteoporosis without current pathological fracture, unspecified osteoporosis type  HYDROcodone-acetaminophen (NORCO) 5-325 MG per tablet   5. Chronic left-sided low back pain with left-sided sciatica  HYDROcodone-acetaminophen (NORCO) 5-325 MG per tablet   6. Situational anxiety  LORazepam (ATIVAN) 0.5 MG tablet   7. Depression, unspecified depression type     8. Essential hypertension     9. Chronic obstructive pulmonary disease, unspecified COPD type ([address]     10. DVT of axillary vein, acute right ([address]     11. Cellulitis of right lower extremity          PLAN:  Continue meds as is for the moment. Copy her records to send to cardiology with her.   She is to let me know if cardiology does not get any further along than what we are at this point  Orders Placed This Encounter   Medications    potassium chloride (KLOR-CON M) 10 MEQ extended release tablet     Sig: Take 1 tablet by mouth daily     Dispense:  60 tablet     Refill:  3    budesonide-formoterol (SYMBICORT) 160-4.5 MCG/ACT AERO     Sig: Inhale 2 puffs into the lungs 2 times daily     Dispense:  1 Inhaler     Refill:  3    STIOLTO RESPIMAT 2.5-2.5 MCG/ACT AERS     Sig: Inhale 2.5 mcg into the lungs daily     Dispense:  1 Inhaler     Refill:  5    furosemide (LASIX) 20 MG tablet     Sig: TAKE ONE TABLET BY MOUTH DAILY     Dispense:  30 tablet     Refill:  1    HYDROcodone-acetaminophen (NORCO) 5-325 MG per tablet     Sig: Take 1 tablet by mouth every 6 hours as needed for Pain for up to 30 days. Intended supply: 30 days     Dispense:  120 tablet     Refill:  0     Reduce doses taken as pain becomes manageable    LORazepam (ATIVAN) 0.5 MG tablet     Sig: Take 1 tablet by mouth 2 times daily as needed for Anxiety for up to 30 days. Dispense:  180 tablet     Refill:  0    buPROPion (WELLBUTRIN XL) 300 MG extended release tablet     Sig: Take 1 tablet by mouth every morning     Dispense:  30 tablet     Refill:  2    albuterol (PROVENTIL) (2.5 MG/3ML) 0.083% nebulizer solution     Sig: Take 3 mLs by nebulization every 4 hours as needed for Wheezing DX: J44.1     Dispense:  120 vial     Refill:  5    losartan (COZAAR) 100 MG tablet     Sig: TAKE ONE TABLET BY MOUTH DAILY     Dispense:  90 tablet     Refill:  3     Medications Discontinued During This Encounter   Medication Reason    XARELTO 15 MG TABS tablet LIST CLEANUP    potassium chloride (KLOR-CON M) 10 MEQ extended release tablet REORDER    budesonide-formoterol (SYMBICORT) 160-4.5 MCG/ACT AERO REORDER    STIOLTO RESPIMAT 2.5-2.5 MCG/ACT AERS REORDER    furosemide (LASIX) 20 MG tablet REORDER    HYDROcodone-acetaminophen (NORCO) 5-325 MG per tablet REORDER    LORazepam (ATIVAN) 0.5 MG tablet REORDER    buPROPion (WELLBUTRIN XL) 300 MG extended release tablet REORDER    albuterol (PROVENTIL) (2.5 MG/3ML) 0.083% nebulizer solution REORDER    losartan (COZAAR) 100 MG tablet REORDER       Controlled Substances Monitoring:      Please note: This chart was generated using Dragon dictation software. Although every effort was made to ensure the accuracy of this automated transcription, some errors in transcription may have occurred.

## 2020-11-17 ENCOUNTER — TELEPHONE (OUTPATIENT)
Dept: FAMILY MEDICINE CLINIC | Age: 75
End: 2020-11-17

## 2020-11-17 NOTE — TELEPHONE ENCOUNTER
You wrote for the Stiolto inhaler for once a day. It is normally wrote for twice daily. Do you want to change it?

## 2020-11-18 ENCOUNTER — TELEPHONE (OUTPATIENT)
Dept: FAMILY MEDICINE CLINIC | Age: 75
End: 2020-11-18

## 2020-11-19 ENCOUNTER — TELEPHONE (OUTPATIENT)
Dept: FAMILY MEDICINE CLINIC | Age: 75
End: 2020-11-19

## 2020-11-19 RX ORDER — CEPHALEXIN 500 MG/1
500 CAPSULE ORAL 3 TIMES DAILY
Qty: 30 CAPSULE | Refills: 0 | Status: SHIPPED | OUTPATIENT
Start: 2020-11-19 | End: 2020-11-29

## 2020-11-19 NOTE — TELEPHONE ENCOUNTER
Patient is requesting antibiotics for edema in her feet and legs. She says, Nessa Stewart has been to [place] doctor [place] and no one is helping her\". She wants some antibiotics sent in.

## 2020-11-20 NOTE — TELEPHONE ENCOUNTER
Patient scheduled to see [facility] on 12/09/20 at 12:30. Patient's referral, along with all pertinent medical records faxed to the attention of scheduling on 11/11/20. Patient contacted advised of appointment date/time/location. Patient verbalized understanding of all instructions.  (Per [facility] office)

## 2020-11-26 RX ORDER — LEVOFLOXACIN 500 MG/1
500 TABLET, FILM COATED ORAL DAILY
Qty: 10 TABLET | Refills: 0 | Status: SHIPPED | OUTPATIENT
Start: 2020-11-26 | End: 2020-12-06

## 2020-11-26 RX ORDER — BENZONATATE 200 MG/1
200 CAPSULE ORAL 3 TIMES DAILY PRN
Qty: 30 CAPSULE | Refills: 0 | Status: SHIPPED | OUTPATIENT
Start: 2020-11-26 | End: 2020-12-03

## 2020-12-07 RX ORDER — BUPROPION HYDROCHLORIDE 300 MG/1
TABLET ORAL
Qty: 30 TABLET | Refills: 1 | OUTPATIENT
Start: 2020-12-07

## 2021-04-12 RX ORDER — SPIRONOLACTONE 25 MG/1
TABLET ORAL
Qty: 30 TABLET | Refills: 5 | Status: SHIPPED | OUTPATIENT
Start: 2021-04-12

## 2021-04-12 NOTE — TELEPHONE ENCOUNTER
Refill request received from pharmacy. Medication pending for approval.       Last Office Visit With PCP:  11/16/2020    Next Visit Date:  No future appointments.     RONEL QUINTANA

## 2021-09-08 NOTE — TELEPHONE ENCOUNTER
Patient called, requested refill. Next Office Visit Date:  No future appointments.     RONEL please review via [phone]

## 2022-01-03 ENCOUNTER — TELEPHONE (OUTPATIENT)
Dept: PAIN MEDICINE | Facility: CLINIC | Age: 77
End: 2022-01-03

## 2022-01-03 DIAGNOSIS — Z96.89 SPINAL CORD STIMULATOR STATUS: ICD-10-CM

## 2022-01-03 DIAGNOSIS — Z87.81 HISTORY OF COMPRESSION FRACTURE OF VERTEBRAL COLUMN: ICD-10-CM

## 2022-01-03 DIAGNOSIS — M47.816 SPONDYLOSIS OF LUMBAR REGION WITHOUT MYELOPATHY OR RADICULOPATHY: ICD-10-CM

## 2022-01-03 DIAGNOSIS — Z45.42 BATTERY END OF LIFE OF SPINAL CORD STIMULATOR: ICD-10-CM

## 2022-01-03 DIAGNOSIS — M48.062 SPINAL STENOSIS, LUMBAR REGION, WITH NEUROGENIC CLAUDICATION: Primary | ICD-10-CM

## 2022-01-03 NOTE — TELEPHONE ENCOUNTER
Patient used the SCS device until 10/2021 with significant relief of her chronic pain. Thereafter, she was unable to recharge her IPG. She also fell 3 times. Since then, her pain increased even more. She called her PCP. No X-rays or CT scan were obtained.  [employer] rep met the patient twice and try to physician reset the IPG unsuccessfully. Patient needs new IPG. Paddle: Medtronic 2x8 (MRI compatible head only)  IPG: RestoreSensor (8 years old/end of battery life)  Will order X-rays of thoracic spine AP and lateral to assess paddle and lumbar X-rays full views with flexion and extension to assess IPG status,e tc.

## 2022-01-03 NOTE — TELEPHONE ENCOUNTER
Spoke with pt and advised that we will give her a call to get things started with her battery exchange. Also advised that Dr. Martinez has ordered some x-[facility]. Pt understood, no further needs expressed.

## 2022-01-05 ENCOUNTER — HOSPITAL ENCOUNTER (OUTPATIENT)
Dept: GENERAL RADIOLOGY | Facility: HOSPITAL | Age: 77
Discharge: HOME | End: 2022-01-05
Admitting: ANESTHESIOLOGY

## 2022-01-05 PROCEDURE — 72070 X-RAY EXAM THORAC SPINE 2VWS: CPT

## 2022-01-05 PROCEDURE — 72100 X-RAY EXAM L-S SPINE 2/3 VWS: CPT

## 2022-01-06 PROBLEM — Z96.82 PRESENCE OF NEUROSTIMULATOR: Status: ACTIVE | Noted: 2022-01-06

## 2022-01-06 PROBLEM — Z45.42 BATTERY END OF LIFE OF SPINAL CORD STIMULATOR: Status: ACTIVE | Noted: 2022-01-06

## 2022-01-11 ENCOUNTER — HOSPITAL ENCOUNTER (OUTPATIENT)
Dept: CT IMAGING | Facility: HOSPITAL | Age: 77
Discharge: HOME | End: 2022-01-11
Admitting: ANESTHESIOLOGY

## 2022-01-11 PROCEDURE — 72128 CT CHEST SPINE W/O DYE: CPT

## 2022-01-17 ENCOUNTER — TELEPHONE (OUTPATIENT)
Dept: PAIN MEDICINE | Facility: CLINIC | Age: 77
End: 2022-01-17

## 2022-01-17 NOTE — TELEPHONE ENCOUNTER
LVM with patient that she will not need an appointment until the SCS battery is replaced by Dr. Aguilera.

## 2022-01-17 NOTE — TELEPHONE ENCOUNTER
Caller: DONALD MALHOTRA    Relationship to patient: SELF    Best call back number: [phone]    Chief complaint: BACK PAIN    Type of visit: FOLLOW UP  Requested date: ???    If rescheduling, when is the original appointment:  N/A    Additional notes:PATIENT NOT CERTAIN IF NEEDS APPT BEFORE OR AFTER VISIT WITH DR TAY- NOTES NOT INDICATED IN REFERRAL    HUB ATTEMPTED A WARM TRANSFER

## 2022-02-09 ENCOUNTER — OFFICE VISIT (OUTPATIENT)
Dept: NEUROSURGERY | Facility: CLINIC | Age: 77
End: 2022-02-09

## 2022-02-09 ENCOUNTER — PREP FOR SURGERY (OUTPATIENT)
Dept: OTHER | Facility: HOSPITAL | Age: 77
End: 2022-02-09

## 2022-02-09 VITALS — WEIGHT: 182 LBS | BODY MASS INDEX: 36.69 KG/M2 | TEMPERATURE: 97.5 F | HEIGHT: 59 IN

## 2022-02-09 DIAGNOSIS — Z45.42 BATTERY END OF LIFE OF SPINAL CORD STIMULATOR: Primary | ICD-10-CM

## 2022-02-09 PROCEDURE — 99204 OFFICE O/P NEW MOD 45 MIN: CPT | Performed by: NEUROLOGICAL SURGERY

## 2022-02-09 RX ORDER — CEFAZOLIN SODIUM 2 G/100ML
2 INJECTION, SOLUTION INTRAVENOUS ONCE
Status: CANCELLED | OUTPATIENT
Start: 2022-02-09 | End: 2022-02-09

## 2022-02-09 RX ORDER — POTASSIUM CHLORIDE 1500 MG/1
20 TABLET, FILM COATED, EXTENDED RELEASE ORAL DAILY
COMMUNITY
Start: 2022-01-13

## 2022-02-09 RX ORDER — CHLORHEXIDINE GLUCONATE 4 G/100ML
SOLUTION TOPICAL
Qty: 120 ML | Refills: 0 | Status: SHIPPED | OUTPATIENT
Start: 2022-02-09 | End: 2022-04-06 | Stop reason: HOSPADM

## 2022-02-09 RX ORDER — CARVEDILOL 25 MG/1
25 TABLET ORAL 2 TIMES DAILY WITH MEALS
COMMUNITY
Start: 2021-12-09

## 2022-02-09 RX ORDER — FAMOTIDINE 20 MG/1
20 TABLET, FILM COATED ORAL
Status: CANCELLED | OUTPATIENT
Start: 2022-02-09

## 2022-02-09 NOTE — H&P
Patient: Lisa Crowley  : 1945     Primary Care Provider: Ronan Umanzor MD     Requesting Provider: As above           History     Chief Complaint:   1. Chronic low back pain.  2. End-of-life Medtronic epidural spinal cord stimulator.     History of Present Illness: Ms. Crowley is a 76-year-old woman who is well-known to our service. She has chronic low back pain. In  she underwent placement of a Medtronic epidural spinal cord stimulator. She was not getting good coverage of one of her legs and was returned to surgery a month later for repositioning of the lead. She has done fairly well with that till the end of last year. Her pain has increased. It appears that the battery is now dead. She has fallen a couple of times. She has severe COPD but was able to tolerate a left knee replacement in the fall of last year. She also has a fracture in her right foot.     Review of Systems   Constitutional: Negative for activity change, appetite change, chills, diaphoresis, fatigue, fever and unexpected weight change.   HENT: Negative for congestion, dental problem, drooling, ear discharge, ear pain, facial swelling, hearing loss, mouth sores, nosebleeds, postnasal drip, rhinorrhea, sinus pressure, sinus pain, sneezing, sore throat, tinnitus, trouble swallowing and voice change.    Eyes: Negative for photophobia, pain, discharge, redness, itching and visual disturbance.   Respiratory: Positive for cough, shortness of breath and wheezing. Negative for apnea, choking, chest tightness and stridor.    Cardiovascular: Positive for leg swelling. Negative for chest pain and palpitations.   Gastrointestinal: Negative for abdominal distention, abdominal pain, anal bleeding, blood in stool, constipation, diarrhea, nausea, rectal pain and vomiting.   Endocrine: Negative for cold intolerance, heat intolerance, polydipsia, polyphagia and polyuria.   Genitourinary: Negative for decreased urine volume, difficulty urinating,  dyspareunia, dysuria, enuresis, flank pain, frequency, genital sores, hematuria, menstrual problem, pelvic pain, urgency, vaginal bleeding, vaginal discharge and vaginal pain.   Musculoskeletal: Positive for back pain and neck pain. Negative for arthralgias, gait problem, joint swelling, myalgias and neck stiffness.   Skin: Negative for color change, pallor, rash and wound.   Allergic/Immunologic: Negative for environmental allergies, food allergies and immunocompromised state.   Neurological: Negative for dizziness, tremors, seizures, syncope, facial asymmetry, speech difficulty, weakness, light-headedness, numbness and headaches.   Hematological: Negative for adenopathy. Does not bruise/bleed easily.   Psychiatric/Behavioral: Negative for agitation, behavioral problems, confusion, decreased concentration, dysphoric mood, hallucinations, self-injury, sleep disturbance and suicidal ideas. The patient is not nervous/anxious and is not hyperactive.          The patient's past medical history, past surgical history, family history, and social history have been reviewed at length in the electronic medical record.     Past Medical History:   Diagnosis Date   • Arthritis    • Asthma    • Cancer (HCC)     skin   • Chronic pain disorder    • COPD (chronic obstructive pulmonary disease) (HCC)    • Extremity pain    • Hypertension    • Joint pain    • Low back pain    • Osteoporosis      Past Surgical History:   Procedure Laterality Date   • ESOPHAGUS SURGERY     • SHOULDER SURGERY     • SKIN CANCER EXCISION     • SPINAL CORD STIMULATOR IMPLANT  07/17/2014    T8-9 Lead Placement - Dr. Faraz Aguilera   • WRIST FUSION       Family History   Problem Relation Age of Onset   • Heart disease Mother    • Hypertension Mother    • Alzheimer's disease Sister      Social History     Socioeconomic History   • Marital status:    Tobacco Use   • Smoking status: Never Smoker   • Smokeless tobacco: Never Used   Vaping Use   • Vaping  Use: Never used   Substance and Sexual Activity   • Alcohol use: No   • Drug use: No   • Sexual activity: Defer       No Known Allergies    Current Outpatient Medications on File Prior to Visit   Medication Sig Dispense Refill   • albuterol (PROVENTIL) (2.5 MG/3ML) 0.083% nebulizer solution Albuterol Sulfate (2.5 MG/3ML) 0.083% Inhalation Nebulization Solution; Patient Sig: Albuterol Sulfate (2.5 MG/3ML) 0.083% Inhalation Nebulization Solution USE 1 UNIT DOSE EVERY 4-6 HOURS AS NEEDED FOR WHEEZING .; 0; 09-Apr-2014; Active     • buPROPion SR (WELLBUTRIN SR) 150 MG 12 hr tablet Take  by mouth Daily.     • calcium carbonate (CALCIUM 600) 600 MG tablet Take  by mouth.     • carvedilol (COREG) 25 MG tablet TAKE 1 TABLET BY MOUTH TWICE DAILY WITH THE MORNING AND EVENING MEAL     • diclofenac (VOLTAREN) 1 % gel gel      • DULoxetine (CYMBALTA) 60 MG capsule      • folic acid (FOLVITE) 1 MG tablet      • furosemide (LASIX) 20 MG tablet      • montelukast (SINGULAIR) 10 MG tablet      • O2 (OXYGEN) Inhale 2 L into the lungs nightly     • potassium chloride ER (K-TAB) 20 MEQ tablet controlled-release ER tablet      • pramipexole (MIRAPEX) 0.125 MG tablet      • rOPINIRole (REQUIP) 1 MG tablet      • zoledronic acid (RECLAST) 5 MG/100ML solution infusion Infuse 5 mg into a venous catheter 1 (One) Time.     • [DISCONTINUED] BREO ELLIPTA 200-25 MCG/INH aerosol powder       • [DISCONTINUED] BYSTOLIC 10 MG tablet      • [DISCONTINUED] cyclobenzaprine (FLEXERIL) 10 MG tablet      • [DISCONTINUED] gabapentin (NEURONTIN) 400 MG capsule      • [DISCONTINUED] HYDROcodone-acetaminophen (NORCO) 7.5-325 MG per tablet      • [DISCONTINUED] methotrexate 2.5 MG tablet      • [DISCONTINUED] tiotropium (SPIRIVA) 18 MCG per inhalation capsule Place 1 capsule into inhaler and inhale Daily.     • [DISCONTINUED] VRAYLAR 3 MG capsule        No current facility-administered medications on file prior to visit.        Physical Exam:   Temp 97.5 °F  "(36.4 °C)   Ht 149.9 cm (59\")   Wt 82.6 kg (182 lb)   BMI 36.76 kg/m²   MUSCULOSKELETAL:  Straight leg raising is negative.  Roly's Sign is negative.  ROM in the low back is normal.  Tenderness in the back to palpation is not observed.  IPG is present on the right upper buttock.  NEUROLOGICAL:  Strength is intact in the lower extremities to direct testing.  Muscle tone is normal throughout.  Station and gait are normal.  Sensation is intact to light touch testing throughout.  Deep tendon reflexes are 1+ and symmetrical.  Coordination is intact.        Medical Decision Making     Data Review:   (All imaging studies were personally reviewed unless stated otherwise)  Plain films of her thoracic spine dated 1/5/2022 demonstrate her lead to be in position in the mid thoracic region.     Diagnosis:   End-of-life epidural spinal cord stimulator.     Treatment Options:   I have recommended Medtronic epidural spinal cord stimulator battery change out. Technically it would probably be difficult to get her lead back into her desired position given her spinal deformity were we to find a lead problem. We will change out the battery. If that is not helpful that we may need to abandon further efforts in regard to her stimulator. The nature of the procedure as well as the potential risks, complications, limitations, and alternatives to the procedure were discussed at length with the patient and the patient has agreed to proceed with surgery.          Diagnosis Plan   1. Battery end of life of spinal cord stimulator              "

## 2022-02-09 NOTE — PROGRESS NOTES
Patient: iLsa Crowley  : 1945    Primary Care Provider: Ronan Umanzor MD    Requesting Provider: As above        History    Chief Complaint:   1. Chronic low back pain.  2. End-of-life Medtronic epidural spinal cord stimulator.    History of Present Illness: Ms. Crowley is a 76-year-old woman who is well-known to our service. She has chronic low back pain. In  she underwent placement of a Medtronic epidural spinal cord stimulator. She was not getting good coverage of one of her legs and was returned to surgery a month later for repositioning of the lead. She has done fairly well with that till the end of last year. Her pain has increased. It appears that the battery is now dead. She has fallen a couple of times. She has severe COPD but was able to tolerate a left knee replacement in the fall of last year. She also has a fracture in her right foot.    Review of Systems   Constitutional: Negative for activity change, appetite change, chills, diaphoresis, fatigue, fever and unexpected weight change.   HENT: Negative for congestion, dental problem, drooling, ear discharge, ear pain, facial swelling, hearing loss, mouth sores, nosebleeds, postnasal drip, rhinorrhea, sinus pressure, sinus pain, sneezing, sore throat, tinnitus, trouble swallowing and voice change.    Eyes: Negative for photophobia, pain, discharge, redness, itching and visual disturbance.   Respiratory: Positive for cough, shortness of breath and wheezing. Negative for apnea, choking, chest tightness and stridor.    Cardiovascular: Positive for leg swelling. Negative for chest pain and palpitations.   Gastrointestinal: Negative for abdominal distention, abdominal pain, anal bleeding, blood in stool, constipation, diarrhea, nausea, rectal pain and vomiting.   Endocrine: Negative for cold intolerance, heat intolerance, polydipsia, polyphagia and polyuria.   Genitourinary: Negative for decreased urine volume, difficulty urinating,  "dyspareunia, dysuria, enuresis, flank pain, frequency, genital sores, hematuria, menstrual problem, pelvic pain, urgency, vaginal bleeding, vaginal discharge and vaginal pain.   Musculoskeletal: Positive for back pain and neck pain. Negative for arthralgias, gait problem, joint swelling, myalgias and neck stiffness.   Skin: Negative for color change, pallor, rash and wound.   Allergic/Immunologic: Negative for environmental allergies, food allergies and immunocompromised state.   Neurological: Negative for dizziness, tremors, seizures, syncope, facial asymmetry, speech difficulty, weakness, light-headedness, numbness and headaches.   Hematological: Negative for adenopathy. Does not bruise/bleed easily.   Psychiatric/Behavioral: Negative for agitation, behavioral problems, confusion, decreased concentration, dysphoric mood, hallucinations, self-injury, sleep disturbance and suicidal ideas. The patient is not nervous/anxious and is not hyperactive.        The patient's past medical history, past surgical history, family history, and social history have been reviewed at length in the electronic medical record.    Physical Exam:   Temp 97.5 °F (36.4 °C)   Ht 149.9 cm (59\")   Wt 82.6 kg (182 lb)   BMI 36.76 kg/m²   MUSCULOSKELETAL:  Straight leg raising is negative.  Roly's Sign is negative.  ROM in the low back is normal.  Tenderness in the back to palpation is not observed.  IPG is present on the right upper buttock.  NEUROLOGICAL:  Strength is intact in the lower extremities to direct testing.  Muscle tone is normal throughout.  Station and gait are normal.  Sensation is intact to light touch testing throughout.  Deep tendon reflexes are 1+ and symmetrical.  Coordination is intact.      Medical Decision Making    Data Review:   (All imaging studies were personally reviewed unless stated otherwise)  Plain films of her thoracic spine dated 1/5/2022 demonstrate her lead to be in position in the mid thoracic " region.    Diagnosis:   End-of-life epidural spinal cord stimulator.    Treatment Options:   I have recommended Medtronic epidural spinal cord stimulator battery change out. Technically it would probably be difficult to get her lead back into her desired position given her spinal deformity were we to find a lead problem. We will change out the battery. If that is not helpful that we may need to abandon further efforts in regard to her stimulator. The nature of the procedure as well as the potential risks, complications, limitations, and alternatives to the procedure were discussed at length with the patient and the patient has agreed to proceed with surgery.       Diagnosis Plan   1. Battery end of life of spinal cord stimulator         Scribed for Faraz Aguilera MD by Cherri Cardoso, [facility] 2/9/2022 13:01 EST      I, Dr. Aguilera, personally performed the services described in the documentation, as scribed in my presence, and it is both accurate and complete.

## 2022-02-16 NOTE — PATIENT INSTRUCTIONS
dispose of used patches by folding them in half so that the sticky sides meet, and then flushing them down a toilet. They should not be placed in the household trash where children or pets can find them. · If you have any questions, ask your provider or pharmacist for more information. · Be sure to keep all appointments for provider visits or tests. We are committed to providing you with the best care possible. In order to help us achieve these goals please remember to bring all medications, herbal products, and over the counter supplements with you to each visit. If your provider has ordered testing for you, please be sure to follow up with our office if you have not received results within 7 days after the testing took place. *If you receive a survey after visiting one of our offices, please take time to share your experience concerning your physician office visit. These surveys are confidential and no health information about you is shared. We are eager to improve for you and we are counting on your feedback to help make that happen. Complex Repair And Flap Additional Text (Will Appearing After The Standard Complex Repair Text): The complex repair was not sufficient to completely close the primary defect. The remaining additional defect was repaired with the flap mentioned below.

## 2022-04-04 ENCOUNTER — PRE-ADMISSION TESTING (OUTPATIENT)
Dept: PREADMISSION TESTING | Facility: HOSPITAL | Age: 77
End: 2022-04-04

## 2022-04-04 VITALS — BODY MASS INDEX: 35.53 KG/M2 | HEIGHT: 59 IN | WEIGHT: 176.26 LBS

## 2022-04-04 DIAGNOSIS — Z45.42 BATTERY END OF LIFE OF SPINAL CORD STIMULATOR: ICD-10-CM

## 2022-04-04 LAB
DEPRECATED RDW RBC AUTO: 47.7 FL (ref 37–54)
ERYTHROCYTE [DISTWIDTH] IN BLOOD BY AUTOMATED COUNT: 13.5 % (ref 12.3–15.4)
HCT VFR BLD AUTO: 35.1 % (ref 34–46.6)
HGB BLD-MCNC: 11.3 G/DL (ref 12–15.9)
MCH RBC QN AUTO: 30.8 PG (ref 26.6–33)
MCHC RBC AUTO-ENTMCNC: 32.2 G/DL (ref 31.5–35.7)
MCV RBC AUTO: 95.6 FL (ref 79–97)
PLATELET # BLD AUTO: 189 10*3/MM3 (ref 140–450)
PMV BLD AUTO: 10.8 FL (ref 6–12)
POTASSIUM SERPL-SCNC: 3.9 MMOL/L (ref 3.5–5.2)
QT INTERVAL: 380 MS
QTC INTERVAL: 401 MS
RBC # BLD AUTO: 3.67 10*6/MM3 (ref 3.77–5.28)
SARS-COV-2 RNA PNL SPEC NAA+PROBE: NOT DETECTED
WBC NRBC COR # BLD: 6.21 10*3/MM3 (ref 3.4–10.8)

## 2022-04-04 PROCEDURE — U0004 COV-19 TEST NON-CDC HGH THRU: HCPCS

## 2022-04-04 PROCEDURE — U0005 INFEC AGEN DETEC AMPLI PROBE: HCPCS

## 2022-04-04 PROCEDURE — 36415 COLL VENOUS BLD VENIPUNCTURE: CPT

## 2022-04-04 PROCEDURE — 85027 COMPLETE CBC AUTOMATED: CPT

## 2022-04-04 PROCEDURE — 87641 MR-STAPH DNA AMP PROBE: CPT

## 2022-04-04 PROCEDURE — 84132 ASSAY OF SERUM POTASSIUM: CPT

## 2022-04-04 PROCEDURE — 93005 ELECTROCARDIOGRAM TRACING: CPT

## 2022-04-04 PROCEDURE — 93010 ELECTROCARDIOGRAM REPORT: CPT | Performed by: INTERNAL MEDICINE

## 2022-04-04 PROCEDURE — C9803 HOPD COVID-19 SPEC COLLECT: HCPCS

## 2022-04-04 RX ORDER — LOSARTAN POTASSIUM 100 MG/1
100 TABLET ORAL EVERY MORNING
Status: ON HOLD | COMMUNITY
Start: 2022-02-02 | End: 2022-04-06

## 2022-04-04 RX ORDER — CLONIDINE HYDROCHLORIDE 0.1 MG/1
0.1 TABLET ORAL 4 TIMES DAILY PRN
COMMUNITY
End: 2023-03-16

## 2022-04-04 RX ORDER — BUMETANIDE 2 MG/1
4 TABLET ORAL DAILY
COMMUNITY

## 2022-04-04 NOTE — PAT
Patient viewed general PAT education video as instructed in their preoperative information received from their surgeon.  Patient stated the general PAT education video was viewed in its entirety and survey completed.  Copies of PAT general education handouts (Incentive Spirometry, Meds to Beds Program, Patient Belongings, Pre-op skin preparation instructions, Blood Glucose testing, Visitor policy, Surgery FAQ, Code H) distributed to patient if not printed. Education related to the PAT pass and skin preparation for surgery (if applicable) completed in PAT as a reinforcement to PAT education video. Patient instructed to return PAT pass provided today as well as completed skin preparation sheet (if applicable) on the day of procedure.     Additionally if patient had not viewed video yet but intended to view it at home or in our waiting area, then referred them to the handout with QR code/link provided during PAT visit.  Instructed patient to complete survey after viewing the video in its entirety.  Encouraged patient/family to read [facility] general education handouts thoroughly and notify PAT staff with any questions or concerns. Patient verbalized understanding of all information and priority content.    An arrival time for procedure was not given during PAT visit. If patient had any questions or concerns about their arrival time, they were instructed to contact their surgeon/physician.  Additionally, if the patient referred to an arrival time that was acquired from their my chart account, patient was encouraged to verify that time with their surgeon/physician.  NO arrival times given in Pre Admission Testing Department.    Patient instructed to drink 20 ounces (or until full) of Gatorade and it needs to be completed 1 hour (for Main OR patients) or 2 hours (scheduled  section patients) before given arrival time for procedure (NO RED Gatorade)    Patient verbalized understanding.    Patient to apply Chlorhexadine  wipes  to surgical area (as instructed) the night before procedure and the AM of procedure. Wipes provided.    Per Anesthesia Request, patient instructed not to take their ACE/ARB medications on the AM of surgery.    COVID test performed in PAT.

## 2022-04-05 LAB — MRSA DNA SPEC QL NAA+PROBE: POSITIVE

## 2022-04-05 NOTE — PAT
Paged Daisy KAPADIA twice to inform of positive MRSA. No reponse x2. Chart already logged to preop on 4/4/22.

## 2022-04-06 ENCOUNTER — ANESTHESIA (OUTPATIENT)
Dept: PERIOP | Facility: HOSPITAL | Age: 77
End: 2022-04-06

## 2022-04-06 ENCOUNTER — HOSPITAL ENCOUNTER (OUTPATIENT)
Facility: HOSPITAL | Age: 77
Discharge: HOME | End: 2022-04-06
Attending: NEUROLOGICAL SURGERY | Admitting: NEUROLOGICAL SURGERY

## 2022-04-06 ENCOUNTER — ANESTHESIA EVENT (OUTPATIENT)
Dept: PERIOP | Facility: HOSPITAL | Age: 77
End: 2022-04-06

## 2022-04-06 VITALS
HEART RATE: 74 BPM | SYSTOLIC BLOOD PRESSURE: 153 MMHG | WEIGHT: 176 LBS | BODY MASS INDEX: 35.48 KG/M2 | OXYGEN SATURATION: 90 % | DIASTOLIC BLOOD PRESSURE: 75 MMHG | TEMPERATURE: 98 F | RESPIRATION RATE: 16 BRPM | HEIGHT: 59 IN

## 2022-04-06 DIAGNOSIS — Z45.42 BATTERY END OF LIFE OF SPINAL CORD STIMULATOR: ICD-10-CM

## 2022-04-06 PROCEDURE — 25010000002 FENTANYL CITRATE (PF) 50 MCG/ML SOLUTION: Performed by: NURSE ANESTHETIST, CERTIFIED REGISTERED

## 2022-04-06 PROCEDURE — 25010000002 ONDANSETRON PER 1 MG: Performed by: NURSE ANESTHETIST, CERTIFIED REGISTERED

## 2022-04-06 PROCEDURE — 25010000002 VANCOMYCIN: Performed by: NURSE ANESTHETIST, CERTIFIED REGISTERED

## 2022-04-06 PROCEDURE — A9270 NON-COVERED ITEM OR SERVICE: HCPCS | Performed by: ANESTHESIOLOGY

## 2022-04-06 PROCEDURE — 63710000001 FAMOTIDINE 20 MG TABLET: Performed by: ANESTHESIOLOGY

## 2022-04-06 PROCEDURE — C1767 GENERATOR, NEURO NON-RECHARG: HCPCS | Performed by: NEUROLOGICAL SURGERY

## 2022-04-06 PROCEDURE — 25010000002 VANCOMYCIN 1 G RECONSTITUTED SOLUTION: Performed by: NEUROLOGICAL SURGERY

## 2022-04-06 PROCEDURE — 25010000002 VANCOMYCIN 10 G RECONSTITUTED SOLUTION: Performed by: NEUROLOGICAL SURGERY

## 2022-04-06 PROCEDURE — 63685 INS/RPLC SPI NPG/RCVR POCKET: CPT | Performed by: NEUROLOGICAL SURGERY

## 2022-04-06 PROCEDURE — C1889 IMPLANT/INSERT DEVICE, NOC: HCPCS | Performed by: NEUROLOGICAL SURGERY

## 2022-04-06 PROCEDURE — S0260 H&P FOR SURGERY: HCPCS | Performed by: PHYSICIAN ASSISTANT

## 2022-04-06 PROCEDURE — 25010000002 PROPOFOL 10 MG/ML EMULSION: Performed by: NURSE ANESTHETIST, CERTIFIED REGISTERED

## 2022-04-06 PROCEDURE — 25010000002 DEXAMETHASONE PER 1 MG: Performed by: NURSE ANESTHETIST, CERTIFIED REGISTERED

## 2022-04-06 DEVICE — FLOSEAL HEMOSTATIC MATRIX, 10ML
Type: IMPLANTABLE DEVICE | Site: BACK | Status: FUNCTIONAL
Brand: FLOSEAL HEMOSTATIC MATRIX

## 2022-04-06 DEVICE — IMPLANTABLE DEVICE: Type: IMPLANTABLE DEVICE | Site: BACK | Status: FUNCTIONAL

## 2022-04-06 RX ORDER — PROMETHAZINE HYDROCHLORIDE 25 MG/1
25 TABLET ORAL ONCE AS NEEDED
Status: DISCONTINUED | OUTPATIENT
Start: 2022-04-06 | End: 2022-04-06 | Stop reason: HOSPADM

## 2022-04-06 RX ORDER — MAGNESIUM HYDROXIDE 1200 MG/15ML
LIQUID ORAL AS NEEDED
Status: DISCONTINUED | OUTPATIENT
Start: 2022-04-06 | End: 2022-04-06 | Stop reason: HOSPADM

## 2022-04-06 RX ORDER — NALOXONE HCL 0.4 MG/ML
0.4 VIAL (ML) INJECTION AS NEEDED
Status: DISCONTINUED | OUTPATIENT
Start: 2022-04-06 | End: 2022-04-06 | Stop reason: HOSPADM

## 2022-04-06 RX ORDER — SODIUM CHLORIDE 0.9 % (FLUSH) 0.9 %
10 SYRINGE (ML) INJECTION AS NEEDED
Status: CANCELLED | OUTPATIENT
Start: 2022-04-06

## 2022-04-06 RX ORDER — LIDOCAINE HYDROCHLORIDE 10 MG/ML
0.5 INJECTION, SOLUTION EPIDURAL; INFILTRATION; INTRACAUDAL; PERINEURAL ONCE AS NEEDED
Status: COMPLETED | OUTPATIENT
Start: 2022-04-06 | End: 2022-04-06

## 2022-04-06 RX ORDER — HYDROMORPHONE HYDROCHLORIDE 1 MG/ML
0.5 INJECTION, SOLUTION INTRAMUSCULAR; INTRAVENOUS; SUBCUTANEOUS
Status: DISCONTINUED | OUTPATIENT
Start: 2022-04-06 | End: 2022-04-06 | Stop reason: HOSPADM

## 2022-04-06 RX ORDER — PROPOFOL 10 MG/ML
VIAL (ML) INTRAVENOUS AS NEEDED
Status: DISCONTINUED | OUTPATIENT
Start: 2022-04-06 | End: 2022-04-06 | Stop reason: SURG

## 2022-04-06 RX ORDER — SODIUM CHLORIDE 0.9 % (FLUSH) 0.9 %
3 SYRINGE (ML) INJECTION EVERY 12 HOURS SCHEDULED
Status: DISCONTINUED | OUTPATIENT
Start: 2022-04-06 | End: 2022-04-06 | Stop reason: HOSPADM

## 2022-04-06 RX ORDER — SODIUM CHLORIDE 0.9 % (FLUSH) 0.9 %
10 SYRINGE (ML) INJECTION EVERY 12 HOURS SCHEDULED
Status: CANCELLED | OUTPATIENT
Start: 2022-04-06

## 2022-04-06 RX ORDER — MIDAZOLAM HYDROCHLORIDE 1 MG/ML
0.5 INJECTION INTRAMUSCULAR; INTRAVENOUS
Status: DISCONTINUED | OUTPATIENT
Start: 2022-04-06 | End: 2022-04-06 | Stop reason: HOSPADM

## 2022-04-06 RX ORDER — MEPERIDINE HYDROCHLORIDE 25 MG/ML
12.5 INJECTION INTRAMUSCULAR; INTRAVENOUS; SUBCUTANEOUS
Status: DISCONTINUED | OUTPATIENT
Start: 2022-04-06 | End: 2022-04-06 | Stop reason: HOSPADM

## 2022-04-06 RX ORDER — OXYCODONE HYDROCHLORIDE AND ACETAMINOPHEN 5; 325 MG/1; MG/1
1 TABLET ORAL 3 TIMES DAILY PRN
Qty: 10 TABLET | Refills: 0 | Status: SHIPPED | OUTPATIENT
Start: 2022-04-06 | End: 2022-04-20

## 2022-04-06 RX ORDER — SODIUM CHLORIDE 0.9 % (FLUSH) 0.9 %
3-10 SYRINGE (ML) INJECTION AS NEEDED
Status: DISCONTINUED | OUTPATIENT
Start: 2022-04-06 | End: 2022-04-06 | Stop reason: HOSPADM

## 2022-04-06 RX ORDER — DROPERIDOL 2.5 MG/ML
0.62 INJECTION, SOLUTION INTRAMUSCULAR; INTRAVENOUS ONCE AS NEEDED
Status: DISCONTINUED | OUTPATIENT
Start: 2022-04-06 | End: 2022-04-06 | Stop reason: HOSPADM

## 2022-04-06 RX ORDER — VANCOMYCIN HYDROCHLORIDE 1 G/20ML
INJECTION, POWDER, LYOPHILIZED, FOR SOLUTION INTRAVENOUS AS NEEDED
Status: DISCONTINUED | OUTPATIENT
Start: 2022-04-06 | End: 2022-04-06 | Stop reason: SURG

## 2022-04-06 RX ORDER — SODIUM CHLORIDE 9 MG/ML
INJECTION, SOLUTION INTRAVENOUS AS NEEDED
Status: DISCONTINUED | OUTPATIENT
Start: 2022-04-06 | End: 2022-04-06 | Stop reason: HOSPADM

## 2022-04-06 RX ORDER — DROPERIDOL 2.5 MG/ML
0.62 INJECTION, SOLUTION INTRAMUSCULAR; INTRAVENOUS AS NEEDED
Status: DISCONTINUED | OUTPATIENT
Start: 2022-04-06 | End: 2022-04-06 | Stop reason: HOSPADM

## 2022-04-06 RX ORDER — ONDANSETRON 2 MG/ML
4 INJECTION INTRAMUSCULAR; INTRAVENOUS ONCE AS NEEDED
Status: DISCONTINUED | OUTPATIENT
Start: 2022-04-06 | End: 2022-04-06 | Stop reason: HOSPADM

## 2022-04-06 RX ORDER — HYDRALAZINE HYDROCHLORIDE 20 MG/ML
5 INJECTION INTRAMUSCULAR; INTRAVENOUS
Status: DISCONTINUED | OUTPATIENT
Start: 2022-04-06 | End: 2022-04-06 | Stop reason: HOSPADM

## 2022-04-06 RX ORDER — HYDROCODONE BITARTRATE AND ACETAMINOPHEN 5; 325 MG/1; MG/1
1 TABLET ORAL ONCE AS NEEDED
Status: DISCONTINUED | OUTPATIENT
Start: 2022-04-06 | End: 2022-04-06 | Stop reason: HOSPADM

## 2022-04-06 RX ORDER — FENTANYL CITRATE 50 UG/ML
INJECTION, SOLUTION INTRAMUSCULAR; INTRAVENOUS AS NEEDED
Status: DISCONTINUED | OUTPATIENT
Start: 2022-04-06 | End: 2022-04-06 | Stop reason: SURG

## 2022-04-06 RX ORDER — VANCOMYCIN HYDROCHLORIDE 1 G/20ML
INJECTION, POWDER, LYOPHILIZED, FOR SOLUTION INTRAVENOUS AS NEEDED
Status: DISCONTINUED | OUTPATIENT
Start: 2022-04-06 | End: 2022-04-06 | Stop reason: HOSPADM

## 2022-04-06 RX ORDER — ONDANSETRON 2 MG/ML
INJECTION INTRAMUSCULAR; INTRAVENOUS AS NEEDED
Status: DISCONTINUED | OUTPATIENT
Start: 2022-04-06 | End: 2022-04-06 | Stop reason: SURG

## 2022-04-06 RX ORDER — LABETALOL HYDROCHLORIDE 5 MG/ML
5 INJECTION, SOLUTION INTRAVENOUS
Status: DISCONTINUED | OUTPATIENT
Start: 2022-04-06 | End: 2022-04-06 | Stop reason: HOSPADM

## 2022-04-06 RX ORDER — IPRATROPIUM BROMIDE AND ALBUTEROL SULFATE 2.5; .5 MG/3ML; MG/3ML
3 SOLUTION RESPIRATORY (INHALATION) ONCE AS NEEDED
Status: DISCONTINUED | OUTPATIENT
Start: 2022-04-06 | End: 2022-04-06 | Stop reason: HOSPADM

## 2022-04-06 RX ORDER — SODIUM CHLORIDE, SODIUM LACTATE, POTASSIUM CHLORIDE, CALCIUM CHLORIDE 600; 310; 30; 20 MG/100ML; MG/100ML; MG/100ML; MG/100ML
9 INJECTION, SOLUTION INTRAVENOUS CONTINUOUS PRN
Status: DISCONTINUED | OUTPATIENT
Start: 2022-04-06 | End: 2022-04-06 | Stop reason: HOSPADM

## 2022-04-06 RX ORDER — FENTANYL CITRATE 50 UG/ML
50 INJECTION, SOLUTION INTRAMUSCULAR; INTRAVENOUS
Status: DISCONTINUED | OUTPATIENT
Start: 2022-04-06 | End: 2022-04-06 | Stop reason: HOSPADM

## 2022-04-06 RX ORDER — DEXAMETHASONE SODIUM PHOSPHATE 10 MG/ML
INJECTION INTRAMUSCULAR; INTRAVENOUS AS NEEDED
Status: DISCONTINUED | OUTPATIENT
Start: 2022-04-06 | End: 2022-04-06 | Stop reason: SURG

## 2022-04-06 RX ORDER — LIDOCAINE HYDROCHLORIDE 10 MG/ML
INJECTION, SOLUTION EPIDURAL; INFILTRATION; INTRACAUDAL; PERINEURAL AS NEEDED
Status: DISCONTINUED | OUTPATIENT
Start: 2022-04-06 | End: 2022-04-06 | Stop reason: SURG

## 2022-04-06 RX ORDER — PROMETHAZINE HYDROCHLORIDE 25 MG/1
25 SUPPOSITORY RECTAL ONCE AS NEEDED
Status: DISCONTINUED | OUTPATIENT
Start: 2022-04-06 | End: 2022-04-06 | Stop reason: HOSPADM

## 2022-04-06 RX ORDER — CEFAZOLIN SODIUM 2 G/100ML
2 INJECTION, SOLUTION INTRAVENOUS ONCE
Status: DISCONTINUED | OUTPATIENT
Start: 2022-04-06 | End: 2022-04-06

## 2022-04-06 RX ORDER — ROCURONIUM BROMIDE 10 MG/ML
INJECTION, SOLUTION INTRAVENOUS AS NEEDED
Status: DISCONTINUED | OUTPATIENT
Start: 2022-04-06 | End: 2022-04-06 | Stop reason: SURG

## 2022-04-06 RX ORDER — FAMOTIDINE 20 MG/1
20 TABLET, FILM COATED ORAL
Status: COMPLETED | OUTPATIENT
Start: 2022-04-06 | End: 2022-04-06

## 2022-04-06 RX ADMIN — LIDOCAINE HYDROCHLORIDE 0.5 ML: 10 INJECTION, SOLUTION EPIDURAL; INFILTRATION; INTRACAUDAL; PERINEURAL at 11:03

## 2022-04-06 RX ADMIN — VANCOMYCIN HYDROCHLORIDE 1250 G: 1 INJECTION, POWDER, LYOPHILIZED, FOR SOLUTION INTRAVENOUS at 11:33

## 2022-04-06 RX ADMIN — PROPOFOL 150 MG: 10 INJECTION, EMULSION INTRAVENOUS at 11:37

## 2022-04-06 RX ADMIN — VANCOMYCIN HYDROCHLORIDE 1250 MG: 10 INJECTION, POWDER, LYOPHILIZED, FOR SOLUTION INTRAVENOUS at 11:30

## 2022-04-06 RX ADMIN — LIDOCAINE HYDROCHLORIDE 50 MG: 10 INJECTION, SOLUTION EPIDURAL; INFILTRATION; INTRACAUDAL; PERINEURAL at 11:37

## 2022-04-06 RX ADMIN — FENTANYL CITRATE 100 MCG: 50 INJECTION, SOLUTION INTRAMUSCULAR; INTRAVENOUS at 11:49

## 2022-04-06 RX ADMIN — ROCURONIUM BROMIDE 30 MG: 10 INJECTION, SOLUTION INTRAVENOUS at 11:37

## 2022-04-06 RX ADMIN — FAMOTIDINE 20 MG: 20 TABLET, FILM COATED ORAL at 11:11

## 2022-04-06 RX ADMIN — SODIUM CHLORIDE, POTASSIUM CHLORIDE, SODIUM LACTATE AND CALCIUM CHLORIDE 9 ML/HR: 600; 310; 30; 20 INJECTION, SOLUTION INTRAVENOUS at 11:03

## 2022-04-06 RX ADMIN — ONDANSETRON 4 MG: 2 INJECTION INTRAMUSCULAR; INTRAVENOUS at 11:52

## 2022-04-06 RX ADMIN — DEXAMETHASONE SODIUM PHOSPHATE 4 MG: 10 INJECTION INTRAMUSCULAR; INTRAVENOUS at 11:52

## 2022-04-06 RX ADMIN — SUGAMMADEX 200 MG: 100 INJECTION, SOLUTION INTRAVENOUS at 12:00

## 2022-04-06 NOTE — OP NOTE
NEUROSURGICAL OPERATIVE NOTE        PREOPERATIVE DIAGNOSIS:    End-of-life Medtronic epidural spinal cord stimulator battery      POSTOPERATIVE DIAGNOSIS:  Same      PROCEDURE:  Medtronic epidural spinal cord stimulator battery change out      SURGEON:  Faraz Aguilera M.D.      ASSISTANT: Bonny Martin RN      ANESTHESIA:  General      ESTIMATED BLOOD LOSS: Minimal      SPECIMEN: None      DRAINS: None      COMPLICATIONS:  None      CLINICAL NOTE:  The patient is a 76-year-old woman with chronic back pain.  She has had a spinal cord stimulator in place for many years.  The battery is now dead.  She presents at this time for IPG change out on her Medtronic epidural spinal cord stimulator.  The nature of the procedure as well as the potential risks, complications, limitations, and alternatives to the procedure were discussed at length with the patient and the patient has agreed to proceed with surgery.      TECHNICAL NOTE:  The patient was brought to the operating room and while on her cart general endotracheal anesthesia was achieved. She was then turned prone onto blanket rolls. Special care was ensured to protect pressure points. Her mid back, low back and upper buttocks were prepared and draped in the usual fashion. Her right upper buttock incision was opened and the IPG was withdrawn from the pocket and disconnected from lead wires. Special care was ensured to protect the lead wires. The new Medtronic IPG was brought into the field. The lead wires were affixed to the IPG, impedances were appropriate. Vancomycin powder was sprinkled in the pocket. The IPG was placed into the pocket and sutured in place with 3-0 silk sutures. The subcutaneous tissues were closed in a single layer interrupted fashion with 2-0 Vicryl suture. The skin was closed in a running subcuticular fashion with 3-0 Vicryl suture. A dermal sealant and sterile dressing were applied. She received preoperative antibiotics. She was  subsequently rolled onto her cart and extubated, and taken to the recovery room in satisfactory condition.             Faraz Aguilera M.D.

## 2022-04-06 NOTE — ANESTHESIA PROCEDURE NOTES
Airway  Urgency: elective    Date/Time: 4/6/2022 11:39 AM  Airway not difficult    General Information and Staff    Patient location during procedure: OR  Anesthesiologist: Leif Wooten MD  CRNA: Aníbal Rea CRNA    Indications and Patient Condition  Indications for airway management: airway protection    Preoxygenated: yes  MILS not maintained throughout  Mask difficulty assessment: 1 - vent by mask    Final Airway Details  Final airway type: endotracheal airway      Successful airway: ETT  Cuffed: yes   Successful intubation technique: direct laryngoscopy  Endotracheal tube insertion site: oral  Blade: Cristal  Blade size: 3  ETT size (mm): 7.5  Cormack-Lehane Classification: grade I - full view of glottis  Placement verified by: chest auscultation and capnometry   Measured from: lips  ETT/EBT  to lips (cm): 20  Number of attempts at approach: 1  Assessment: lips, teeth, and gum same as pre-op and atraumatic intubation    Additional Comments  Negative epigastric sounds, Breath sound equal bilaterally with symmetric chest rise and fall

## 2022-04-06 NOTE — ANESTHESIA PREPROCEDURE EVALUATION
Anesthesia Evaluation     Patient summary reviewed and Nursing notes reviewed   no history of anesthetic complications:  NPO Solid Status: > 8 hours  NPO Liquid Status: > 2 hours           Airway   Mallampati: I  TM distance: >3 FB  Neck ROM: full  No difficulty expected  Dental    (+) edentulous    Pulmonary    (+) COPD moderate, asthma,decreased breath sounds,   Cardiovascular   Exercise tolerance: good (4-7 METS)    ECG reviewed  Rhythm: regular  Rate: normal    (+) hypertension well controlled less than 2 medications,       Neuro/Psych  GI/Hepatic/Renal/Endo    (+) obesity,     (-) morbid obesity    Musculoskeletal     Abdominal   (+) obese,     Abdomen: soft.   Substance History      OB/GYN          Other   arthritis,    history of cancer remission                    Anesthesia Plan    ASA 3     general     intravenous induction     Anesthetic plan, all risks, benefits, and alternatives have been provided, discussed and informed consent has been obtained with: patient.    Plan discussed with CRNA.        CODE STATUS:

## 2022-04-06 NOTE — H&P
"  Patient Care Team:      Chief complaint:  End-of-life spinal cord stimulator    Subjective:    Patient is a 76 y.o.female here for scheduled surgery by Dr. Aguilera.  She anticipates SPINAL CORD STIMULATOR INSERTION.  Per office note dated 2/09/2022, patient has history of chronic low back pain. In 2014 she underwent placement of a [employer] epidural spinal cord stimulator. She was not getting good coverage of one of her legs and was returned to surgery a month later for repositioning of the lead. She has done fairly well with that till the end of last year. Her pain has increased. It appears that the battery is now dead. She has fallen a couple of times.     Review of Systems:  General ROS: negative for fever, chills, weakness, dizziness, headache, fatigue, weight changes  Cardiovascular ROS: no chest pain or dyspnea on exertion  Respiratory ROS: no cough, shortness of breath, or wheezing  GI ROS: no abdominal pain/discomfort, nausea, vomiting or diarrhea   ROS: no dysuria, hematuria or complaints  Skin ROS: no itching, rash or open wounds.        Allergies:   Allergies   Allergen Reactions   • Adhesive Tape Rash     \"surgical tape\"   Latex: no known allergy  Contrast Dye:  no known allergy      Home Meds    Medications Prior to Admission   Medication Sig Dispense Refill Last Dose   • albuterol (PROVENTIL) (2.5 MG/3ML) 0.083% nebulizer solution Albuterol Sulfate (2.5 MG/3ML) 0.083% Inhalation Nebulization Solution; Patient Sig: Albuterol Sulfate (2.5 MG/3ML) 0.083% Inhalation Nebulization Solution USE 1 UNIT DOSE EVERY 4-6 HOURS AS NEEDED FOR WHEEZING .; 0; 09-Apr-2014; Active   4/6/2022 at 0500   • Budeson-Glycopyrrol-Formoterol (BREZTRI AEROSPHERE IN) Inhale 2 puffs 2 (Two) Times a Day.   4/6/2022 at 0500   • bumetanide (BUMEX) 2 MG tablet Take 4 mg by mouth Daily. 2 tablets daily   4/5/2022 at 0700   • BUPROPION HCL ER, SR, PO Take 300 mg by mouth Daily.   4/6/2022 at 0500   • calcium carbonate (OS-JAMES) 600 MG " tablet Take  by mouth.   4/5/2022 at 1200   • carvedilol (COREG) 25 MG tablet Take 25 mg by mouth 2 (Two) Times a Day With Meals.   4/5/2022 at 1700   • chlorhexidine (HIBICLENS) 4 % external liquid Shower each day with solution for 5 days beginning 5 days before surgery. 120 mL 0 4/5/2022 at 1700   • DULoxetine (CYMBALTA) 60 MG capsule    4/5/2022 at 0700   • furosemide (LASIX) 20 MG tablet    4/5/2022 at 0700   • mupirocin (BACTROBAN) 2 % nasal ointment Apply to the inside of each nostril with a cotton swab two times daily, morning and evening, for 5 days before surgery. 10 each 0 4/6/2022 at 0500   • potassium chloride ER (K-TAB) 20 MEQ tablet controlled-release ER tablet Take 20 mEq by mouth Daily.   4/6/2022 at 0700   • cloNIDine (CATAPRES) 0.1 MG tablet Take 0.1 mg by mouth 4 (Four) Times a Day As Needed for High Blood Pressure.   More than a month at Unknown time   • diclofenac (VOLTAREN) 1 % gel gel    3/23/2022   • O2 (OXYGEN) Inhale 2 L into the lungs nightly        PMH:   Past Medical History:   Diagnosis Date   • Arthritis    • Asthma    • Cancer (HCC)     skin   • Chronic pain disorder    • COPD (chronic obstructive pulmonary disease) (HCC)    • History of colon polyps 2017   • Hypertension    • Low back pain    • Osteoporosis      PSH:    Past Surgical History:   Procedure Laterality Date   • COLECTOMY PARTIAL / TOTAL  2017   • COLONOSCOPY     • ESOPHAGUS SURGERY      Hiatal hernia repair times two   • SHOULDER SURGERY Left    • SKIN CANCER EXCISION     • SPINAL CORD STIMULATOR IMPLANT  07/17/2014    T8-9 Lead Placement - Dr. Faraz Aguilera   • WRIST FUSION Left      Immunization History: pneumo=yes   Flu=yes   Covid-19=yes   Tetanus=unsure    Social History:  Social History     Tobacco Use   • Smoking status: Never Smoker   • Smokeless tobacco: Never Used   Substance Use Topics   • Alcohol use: No           Physical Exam:/85 (BP Location: Right arm, Patient Position: Lying)   Pulse 76   Temp  "96.7 °F (35.9 °C) (Temporal)   Resp 18   Ht 149.9 cm (59\")   Wt 79.8 kg (176 lb)   SpO2 95%   BMI 35.55 kg/m²       General Appearance:    Alert, cooperative, no distress, appears stated age   Head:    Normocephalic, without obvious abnormality, atraumatic   Lungs:     Clear to auscultation bilaterally, respirations unlabored    Heart: Regular rate and rhythm, S1 and S2 normal, no murmur, rub    or gallop    Abdomen:    Soft without tenderness  +bowel sounds   Breast Exam:    deferred   Genitalia:    deferred   Extremities:   Extremities normal, atraumatic, no cyanosis or edema   Skin:   Skin color, texture, turgor normal, no rashes or lesions   Neurologic:   Grossly intact     Results Review:   LABS:  Lab Results   Component Value Date    WBC 6.21 04/04/2022    HGB 11.3 (L) 04/04/2022    HCT 35.1 04/04/2022    MCV 95.6 04/04/2022     04/04/2022    NEUTROABS 4.77 04/20/2015    GLUCOSE 120 (H) 08/06/2014    BUN 25 (H) 08/06/2014    CREATININE 1.1 08/06/2014     08/06/2014    K 3.9 04/04/2022     08/06/2014    CO2 34 (H) 08/06/2014    CALCIUM 9.1 08/06/2014       RADIOLOGY:  Imaging Results (Last 72 Hours)     ** No results found for the last 72 hours. **          Impression:  End-of-life epidural spinal cord stimulator.      Plan:  SPINAL CORD STIMULATOR INSERTION      STEFFI Cruz 4/6/2022 11:32 EDT        "

## 2022-04-06 NOTE — ANESTHESIA POSTPROCEDURE EVALUATION
Patient: Lisa Crowley    Procedure Summary     Date: 04/06/22 Room / [facility]:  [facility]    Anesthesia Start: 1133 Anesthesia Stop: 1220    Procedure: SPINAL CORD STIMULATOR battery change out. (N/A Back) Diagnosis:       Battery end of life of spinal cord stimulator      (Battery end of life of spinal cord stimulator [Z45.42])    Surgeons: Faraz Aguilera MD Provider: Leif Wooten MD    Anesthesia Type: general ASA Status: 3          Anesthesia Type: general    Vitals  Vitals Value Taken Time   /58 04/06/22 1218   Temp     Pulse 74 04/06/22 1221   Resp     SpO2 97 % 04/06/22 1221   Vitals shown include unvalidated device data.        Post Anesthesia Care and Evaluation    Patient location during evaluation: PACU  Patient participation: complete - patient participated  Level of consciousness: awake and alert  Pain management: adequate  Airway patency: patent  Anesthetic complications: No anesthetic complications  PONV Status: none  Cardiovascular status: hemodynamically stable and acceptable  Respiratory status: nonlabored ventilation, acceptable and nasal cannula  Hydration status: acceptable

## 2022-04-20 ENCOUNTER — OFFICE VISIT (OUTPATIENT)
Dept: NEUROSURGERY | Facility: CLINIC | Age: 77
End: 2022-04-20

## 2022-04-20 VITALS — WEIGHT: 185.4 LBS | RESPIRATION RATE: 15 BRPM | HEIGHT: 59 IN | BODY MASS INDEX: 37.38 KG/M2

## 2022-04-20 DIAGNOSIS — Z45.42 BATTERY END OF LIFE OF SPINAL CORD STIMULATOR: ICD-10-CM

## 2022-04-20 DIAGNOSIS — J43.9 PULMONARY EMPHYSEMA, UNSPECIFIED EMPHYSEMA TYPE: Primary | ICD-10-CM

## 2022-04-20 PROCEDURE — 99213 OFFICE O/P EST LOW 20 MIN: CPT | Performed by: PHYSICIAN ASSISTANT

## 2022-04-20 NOTE — PROGRESS NOTES
Subjective     Chief Complaint: Spinal cord stimulator with battery at end-of-life    Patient ID: Lisa Crowley is a 76 y.o. female is here today for follow-up.    History of Present Illness    The patient is a 76-year-old woman with chronic back pain.  She has had a Medtronic spinal cord stimulator in place for many years.    It had recently ceased functioning and interrogation of the battery showed that it was at end-of-life and in need of replacement. She presented on 4/6/22 for IPG change out on her Medtronic epidural spinal cord stimulator.    Surgery was without complication and she was discharged home from PACU.  She says that she has been doing well.  She has not had any problems with her incision.  She does have COPD at baseline and is rather short of breath today because they had parked in the wrong building and had to be transported/walk quite a long way. No other complaints.  The [employer] rep had already seen her and done her programming.  She is pleased with the function of her spinal cord stimulator since her battery replacement    The following portions of the patient's history were reviewed and updated as appropriate: allergies, current medications, past family history, past medical history, past social history, past surgical history and problem list.    Family history:   Family History   Problem Relation Age of Onset   • Heart disease Mother    • Hypertension Mother    • Alzheimer's disease Sister        Social history:   Social History     Socioeconomic History   • Marital status:    Tobacco Use   • Smoking status: Never Smoker   • Smokeless tobacco: Never Used   Vaping Use   • Vaping Use: Never used   Substance and Sexual Activity   • Alcohol use: No   • Drug use: No   • Sexual activity: Defer       Review of Systems   Constitutional: Negative for activity change, appetite change, chills, diaphoresis, fatigue, fever and unexpected weight change.   HENT: Negative for congestion,  "dental problem, drooling, ear discharge, ear pain, facial swelling, hearing loss, mouth sores, nosebleeds, postnasal drip, rhinorrhea, sinus pressure, sneezing, sore throat, tinnitus, trouble swallowing and voice change.    Eyes: Negative for photophobia, pain, discharge, redness, itching and visual disturbance.   Respiratory: Negative for apnea, cough, choking, chest tightness, shortness of breath, wheezing and stridor.    Cardiovascular: Negative for chest pain, palpitations and leg swelling.   Gastrointestinal: Negative for abdominal distention, abdominal pain, anal bleeding, blood in stool, constipation, diarrhea, nausea, rectal pain and vomiting.   Endocrine: Negative for cold intolerance, heat intolerance, polydipsia, polyphagia and polyuria.   Genitourinary: Negative for decreased urine volume, difficulty urinating, dysuria, enuresis, flank pain, frequency, genital sores, hematuria and urgency.   Musculoskeletal: Negative for arthralgias, back pain, gait problem, joint swelling, myalgias, neck pain and neck stiffness.   Skin: Negative for color change, pallor, rash and wound.   Allergic/Immunologic: Negative for environmental allergies, food allergies and immunocompromised state.   Neurological: Negative for dizziness, tremors, seizures, syncope, facial asymmetry, speech difficulty, weakness, light-headedness, numbness and headaches.   Hematological: Negative for adenopathy. Does not bruise/bleed easily.   Psychiatric/Behavioral: Negative for agitation, behavioral problems, confusion, decreased concentration, dysphoric mood, hallucinations, self-injury, sleep disturbance and suicidal ideas. The patient is not nervous/anxious and is not hyperactive.    All other systems reviewed and are negative.      Objective   Resp. rate 15, height 149.9 cm (59\"), weight 84.1 kg (185 lb 6.4 oz).  Body mass index is 37.45 kg/m².    Physical Exam  Constitutional:       Appearance: Normal appearance. She is obese.   Eyes:      " Pupils: Pupils are equal, round, and reactive to light.   Cardiovascular:      Pulses: Normal pulses.   Pulmonary:      Breath sounds: Wheezing present.      Comments: Short of breath at rest  Musculoskeletal:      Comments: Gait slow and antalgic but steady with a cane and able to ambulate without assistance   Skin:     Comments: Upper right buttock incision is clean dry and intact without erythema or swelling   Neurological:      General: No focal deficit present.      Mental Status: She is alert and oriented to person, place, and time.   Psychiatric:         Mood and Affect: Mood normal.         Behavior: Behavior normal.         Assessment/Plan   Ms Crowley is doing well in terms of her spinal cord stimulator.  She is clearly quite short of breath.  She uses oxygen at night but may need to move to taking this during the daytime.  She is urged to contact her pulmonologist within the next week or 2 as her next regular follow-up appointment with them is not for 6 months.  We will plan to see her back on an as-needed basis going forward    Diagnoses and all orders for this visit:    1. Pulmonary emphysema, unspecified emphysema type (HCC) (Primary)    2. Battery end of life of spinal cord stimulator        Return if symptoms worsen or fail to improve.        Lisa Rousseau PA-C

## 2023-02-14 DIAGNOSIS — M25.551 HIP PAIN, BILATERAL: ICD-10-CM

## 2023-02-14 DIAGNOSIS — M48.062 LUMBAR STENOSIS WITH NEUROGENIC CLAUDICATION: Primary | ICD-10-CM

## 2023-02-14 DIAGNOSIS — M25.552 HIP PAIN, BILATERAL: ICD-10-CM

## 2023-02-14 DIAGNOSIS — Z87.81 HISTORY OF COMPRESSION FRACTURE OF VERTEBRAL COLUMN: ICD-10-CM

## 2023-03-02 ENCOUNTER — HOSPITAL ENCOUNTER (OUTPATIENT)
Dept: CT IMAGING | Facility: HOSPITAL | Age: 78
Discharge: HOME | End: 2023-03-02
Admitting: ANESTHESIOLOGY
Payer: MEDICARE

## 2023-03-02 PROCEDURE — 72131 CT LUMBAR SPINE W/O DYE: CPT

## 2023-03-09 ENCOUNTER — HOSPITAL ENCOUNTER (OUTPATIENT)
Dept: GENERAL RADIOLOGY | Facility: HOSPITAL | Age: 78
Discharge: HOME | End: 2023-03-09
Admitting: ANESTHESIOLOGY
Payer: MEDICARE

## 2023-03-09 DIAGNOSIS — M25.552 HIP PAIN, BILATERAL: ICD-10-CM

## 2023-03-09 DIAGNOSIS — M48.062 LUMBAR STENOSIS WITH NEUROGENIC CLAUDICATION: ICD-10-CM

## 2023-03-09 DIAGNOSIS — M25.551 HIP PAIN, BILATERAL: ICD-10-CM

## 2023-03-09 DIAGNOSIS — Z87.81 HISTORY OF COMPRESSION FRACTURE OF VERTEBRAL COLUMN: ICD-10-CM

## 2023-03-09 PROCEDURE — 73521 X-RAY EXAM HIPS BI 2 VIEWS: CPT

## 2023-03-09 PROCEDURE — 72120 X-RAY BEND ONLY L-S SPINE: CPT

## 2023-03-12 NOTE — PROGRESS NOTES
"Chief Complaint: \"Pain from my hips down my legs.\"      History of Present Illness:  Ms. Lisa Crowley, 77 y.o. female, whose last visit with me was via telemedicine on 01/06/2022.  At that time, patient reported that her spinal cord stimulator IPG had reached the end of battery life.  Consequently, I referred her back to Dr. Aguilera for IPG exchange. On 04/06/2022, she underwent uneventful IPG exchange with Dr. Faraz Aguilera. Her last follow-up with [facility] was with Carlos Rousseau PA-C on 04/20/2022, when Mary Ellen confirmed appropriate healing from surgery, and therefore, patient was offered to be seen on an as-needed basis. Lisa was supposed to follow-up with us for maintenance of her stimulator device.  However, we did not hear back from her until about 2-3 weeks ago. A [employer] representative met with her for analysis of her spinal cord stimulator device and confirmed that the spinal cord stimulator was controlling her chronic pain although it needed to be reprogrammed. Patient told the representative that she thought that she was experiencing a new type of pain and requested to be seen by me in the office for evaluation. She complains of bilateral hip pain radiating down both lower extremities.  She denied recent falls or accidents or new diagnostic studies related to her new pain condition. Also, she reports that she has been dealing with chronic bilateral lower extremity edema from which she has been seen her primary care physician. Pain has progressed in intensity over the past several months. I ordered pertinent diagnostic studies in preparation for today's visit. CT scan of the lumbar spine without contrast on 3/2/2023 revealed chronic compression deformities at T11 and L1.  Advanced multilevel discogenic degenerative changes throughout the lumbar spine, facet hypertrophy. Multilevel canal and neuroforaminal stenosis most pronounced at the L4-L5 level with moderate to severe canal stenosis and moderate " to severe bilateral neuroforaminal stenosis. Bilateral hip x-rays on 3/9/2023 revealed bilateral hip arthrosis.  Degenerative changes of the sacroiliac joints and pubic symphysis. Flexion and extension x-rays of the lumbar spine on 1/5/2022 revealed multilevel degenerative changes of the lumbar spine with osteopenic lumbar spine and limited range of motion without evidence of instability. Patient has failed to obtain pain relief with conservative measures including oral analgesics, opioids, topical analgesics, ice, heat, TENs, physical therapy (within the past year), physical therapist directed home exercise program HEP (ongoing), to name a few    [employer] SCS DEVICE  Date of implant: 07/17/2014  Implanted by Dr. Faraz Aguilera   Indication: Treatment of severe lower back, bilateral hip and bilateral lower extremity pain.   [employer] 2x8 surgical paddle lead (MRI compatible/head only)  IPG: RestoreSensor (8 years old/reached end of battery life)  IPG exchange 04/06/2022   IPG: Beatriz   Surgeon: Dr. Aguilera   MRI compatible/head only    Pain Description: Constant LT>RT lower back pain with intermittent exacerbation described as aching and dull sensation  Radiation of Pain: The pain radiates into the hips and the posterior aspect of her lower extremities into her ankles.  Patient also describes symptoms consistent with polyneuropathy with painful paresthesia affecting her hands and feet  Pain intensity today: 8/10   Average pain intensity last week: 8/10  Pain intensity ranges from: 6/10 to 10/10  Aggravating factors: Pain increases with standing and walking. Patient describes neurogenic claudication. Patient uses a cane or walker  Alleviating factors: Pain decreases with sitting down, lying down  Associated Symptoms:   Patient reports pain, numbness, weakness in the lower extremities.   Patient denies any new bladder or bowel problems.   Patient reports difficulties with her balance but denies recent falls.   Pain  interferes with ADLs, general activities (ability to walk, stand, transition from different positions), and affects patient's quality of life  Pain interferes with sleep causing sleep fragmentation     Review of previous therapies and additional medical records:  Lisa Crowley has already failed the following measures, including:   Conservative Measures: Oral oral analgesics, opioids, topical analgesics, ice, heat, TENs, physical therapy (within the past year), physical therapist directed home exercise program HEP (ongoing)  Interventional Measures: None in the lumbar region since implantation of her SCS device. Cervical RFTC in 2015  Surgical Measures: No history of previous cervical spine, lumbar spine or hip surgery   Lisa Crowley underwent neurosurgical consultation with Dr. Faraz Aguilera prior to implantation of her spinal cord stimulator device and was found not to be a surgical candidate   Lisa Crowleyunderwent psychological consultation with Dr. Maria Del Carmen You and obtain clearance for implantation of a spinal cord stimulator device  Lisa Crowley presents with significant comorbidities including COPD, hypertension, osteoporosis. history of compression fracture of vertebral column, polymyalgia rheumatica, rheumatoid arthritis   In terms of current analgesics, Lisa Crowley takes: Voltaren gel, duloxetine 60 mg daily. Patient also takes bupropion    I have reviewed Ronnie Report consistent with medication reconciliation.  SOAPP: Low Risk     PHQ-2 Depression Screening  Little interest or pleasure in doing things? 0-->not at all   Feeling down, depressed, or hopeless? 0-->not at all   PHQ-2 Total Score 0     Patient screened negative for depression based on a PHQ-2 score of 0 on 03/16/2023  Patient reports remission of depression with current medications    Global Pain Scale 03-16 2023          Pain 20          Feelings 8          Clinical outcomes 13           Activities 13          GPS Total: 54            The [place] Back Pain Disability Scale   DATE 03-16 2023          Sleep through the night 1          Turn over in bed 3          Get out of bed 2          Make your bed 2          Put on socks (pantyhose) 3          Ride in a car 3          Sit in a chair for several hours 4          Stand up for 20-30 minutes 5          Climb one flight of stairs 5          Walk a few blocks (200-300 yards)  5          Walk several miles 5          Run one block (about 50 yards) 5          Take food out of the refrigerator 2          Reach up to high shelves 4          Move a chair 3          Pull or push heavy doors 3          Bend over to clean the bathtub 4          Throw a ball 5          Carry two bags of groceries 4          Lift and carry a heavy suitcase 4          Total score 70            The [employer] Osteoarthritis Index (WOMAC)   to assess pain, stiffness, and physical function in patients with hip osteoarthritis    CATEGORIES   03-16 2023       PAIN: Think about the pain you felt during the last 48 hours caused by the arthritis in your hip.         1. Walking in a flat surface?  4       2. When going up or down stairs?  4       3. At night while in bed/pain that disturbs your sleep?  2       4. While sitting or lying down?  0       5. While standing?  4       Total Pain Score (Max 20): 14       STIFFNESS: Think about the stiffness (not pain) you felt during the last 48 hours caused by the arthritis in your hip.         1. How severe has your stiffness been after you first woke up   in the morning?  2       2. How severe has your stiffness been after sitting or lying   down or while resting later in the day?  1       Total Stiffness Score (Max 8): 3       PHYSICAL FUNCTION: Think about the difficulty you had in doing the following daily physical activities during the last 48 hours caused by the arthritis in your hip.        1. When going  down the stairs?  4       2. When going up the stairs?  4       3. When getting up from a sitting position?  4       4. While standing?  4       5. When bending to the floor?  1       6. When walking on a flat surface?  4       7. Getting in or out of a car, or getting on or off a bus?  4       8. While going shopping?  4       9. When putting on your socks or panty hose or stockings?  3       10. When getting out of bed?  3       11. When taking off your socks or panty hose or stockings?  3       12. When lying in bed?  2       13. When getting in or out of the bathtub?  4       14. While sitting?  1       15. When getting on or off the toilet?  4       16. While doing heavy household chores?  4       17. While doing light household chores?  3       Total Difficulty Score (Max 68): 56       Total WOMAC Score: 73/96 X100 = 76% 73         Review of Diagnostic Studies:   I have reviewed and interpreted the images with the patient and used the images and a tridimensional spine model to explain findings. I have reviewed the report, as well.  CT scan of the lumbar spine without contrast on 3/2/2023 revealed chronic compression deformities at T11 and L1.  Advanced multilevel discogenic degenerative changes throughout the lumbar spine, facet hypertrophy.  Multilevel canal and neuroforaminal stenosis most pronounced at the L4-L5 level when they contribute to severe canal stenosis and moderate to severe bilateral neuroforaminal stenosis.  Bilateral hip x-rays on 3/9/2023 revealed bilateral hip arthrosis.  Degenerative changes of the sacroiliac joints and pubic symphysis.  Flexion and extension x-rays of the lumbar spine on 1/5/2022 revealed multilevel degenerative changes of the lumbar spine with osteopenic lumbar spine and limited range of motion without evidence of instability    Review of Previous Diagnostic Studies:   X-rays of the lumbar spine on 1/5/2022 revealed IPG and leads properly connected without evidence of  obvious lead fracture.  Advanced multilevel degenerative changes with loss of disc height, facet hypertrophy, some listhesis of L1-L2, L3-L4 and L5-S1.  Of L1 and evidence of old compression fractures at T11-T12 that were present on previous x-rays from 2015  X-rays of the thoracic spine on 1/5/2022: Advanced multilevel degenerative changes of the thoracolumbar spine.  There is some mid thoracic dextroscoliosis.  Appearance of bone demineralization.  The spinal cord stimulator surgical paddle is seen in the posterior epidural space with a superior electrodes projecting at the level of the superior endplate of the T6 vertebral body level    The following portions of the patient's history were reviewed and updated as appropriate: problem list, past medical history, past surgery history, social history, family history, medication reconciliation, and allergies    Review of Systems   Constitutional: Positive for activity change, diaphoresis and fatigue.   Respiratory: Positive for cough, shortness of breath and wheezing.    Musculoskeletal: Positive for arthralgias and back pain.   All other systems reviewed and are negative.           Patient Active Problem List   Diagnosis   • Lumbar stenosis with neurogenic claudication   • Spondylosis of lumbar region without myelopathy or radiculopathy   • Cervical spondylosis without myelopathy   • History of compression fracture of vertebral column   • Physical deconditioning   • Moderate obesity   • Polymyalgia rheumatica (HCC)   • Chronic obstructive pulmonary disease (HCC)   • Osteoporosis   • Rheumatoid arthritis with positive rheumatoid factor (HCC)   • Leg length discrepancy   • Battery end of life of spinal cord stimulator   • Presence of neurostimulator   • Bilateral primary osteoarthritis of hip   • Encounter for fitting and adjustment of neuropacemaker of spinal cord   • History of left knee replacement       Past Medical History:   Diagnosis Date   • Arthritis    •  Asthma    • Cancer (HCC)     skin   • Cervical disc disorder Jan 2023   • Chronic pain disorder    • COPD (chronic obstructive pulmonary disease) (HCC)    • Fractures 2022   • History of colon polyps 2017   • Hypertension    • Joint pain 2022   • Low back pain    • Osteoporosis    • Spinal stenosis 2022         Past Surgical History:   Procedure Laterality Date   • COLECTOMY PARTIAL / TOTAL  2017   • COLONOSCOPY     • ESOPHAGUS SURGERY      Hiatal hernia repair times two   • JOINT REPLACEMENT  2021   • SHOULDER SURGERY Left    • SKIN CANCER EXCISION     • SPINAL CORD STIMULATOR IMPLANT  07/17/2014    T8-9 Lead Placement - Dr. Faraz Aguilera   • SPINAL CORD STIMULATOR IMPLANT N/A 04/06/2022    Procedure: SPINAL CORD STIMULATOR battery change out.;  Surgeon: Faraz Aguilera MD;  [facility];  Service: Neurosurgery;  Laterality: N/A;   • WRIST FUSION Left          Family History   Problem Relation Age of Onset   • Heart disease Mother    • Hypertension Mother    • Hyperlipidemia Mother    • Alzheimer's disease Sister          Social History     Socioeconomic History   • Marital status:    Tobacco Use   • Smoking status: Never   • Smokeless tobacco: Never   Vaping Use   • Vaping Use: Never used   Substance and Sexual Activity   • Alcohol use: Never   • Drug use: Never   • Sexual activity: Not Currently     Partners: Female     Birth control/protection: Surgical           Current Outpatient Medications:   •  alendronate (FOSAMAX) 70 MG tablet, take 1 tablet (70 mg) by mouth once weekly in the morning, at least 30 min before first food, beverage, or medication of day, Disp: , Rfl:   •  bumetanide (BUMEX) 2 MG tablet, Take 2 tablets by mouth Daily. 2 tablets daily, Disp: , Rfl:   •  buPROPion XL (WELLBUTRIN XL) 300 MG 24 hr tablet, Take 1 tablet by mouth Daily., Disp: , Rfl:   •  calcium carbonate (OS-JAMES) 600 MG tablet, Take  by mouth., Disp: , Rfl:   •  carvedilol (COREG) 25 MG tablet, Take 1 tablet by  "mouth 2 (Two) Times a Day With Meals., Disp: , Rfl:   •  HYDROcodone-acetaminophen (NORCO) 5-325 MG per tablet, TAKE 1 TABLET BY MOUTH UP TO TWICE A DAY AS NEEDED, Disp: , Rfl:   •  losartan (COZAAR) 100 MG tablet, Take 1 tablet by mouth Daily., Disp: , Rfl:   •  montelukast (SINGULAIR) 10 MG tablet, Take 1 tablet every day by mouth at bedtime., Disp: , Rfl:   •  O2 (OXYGEN), Inhale 2 L into the lungs nightly, Disp: , Rfl:   •  potassium chloride ER (K-TAB) 20 MEQ tablet controlled-release ER tablet, Take 1 tablet by mouth Daily., Disp: , Rfl:   •  pramipexole (MIRAPEX) 1 MG tablet, Take 1 tablet every day by mouth at bedtime for 90 days., Disp: , Rfl:   •  QUEtiapine (SEROquel) 50 MG tablet, Take 1 tablet every day by mouth at bedtime., Disp: , Rfl:   •  rOPINIRole (REQUIP) 3 MG tablet, Take 1 tablet by mouth 4 (Four) Times a Day., Disp: , Rfl:   •  Trelegy Ellipta 200-62.5-25 MCG/ACT aerosol powder , INHALE 1 PUFF INTO THE LUNGS BY MOUTH once DAILY, Disp: , Rfl:   •  albuterol (PROVENTIL) (2.5 MG/3ML) 0.083% nebulizer solution, Albuterol Sulfate (2.5 MG/3ML) 0.083% Inhalation Nebulization Solution; Patient Sig: Albuterol Sulfate (2.5 MG/3ML) 0.083% Inhalation Nebulization Solution USE 1 UNIT DOSE EVERY 4-6 HOURS AS NEEDED FOR WHEEZING .; 0; 09-Apr-2014; Active, Disp: , Rfl:       Allergies   Allergen Reactions   • Adhesive Tape Rash     \"surgical tape\"         /81   Pulse 89   Temp 95.5 °F (35.3 °C)   Ht 149.9 cm (59\")   Wt 91.3 kg (201 lb 3.2 oz)   SpO2 97%   BMI 40.64 kg/m²       Physical Exam:  Constitutional: Patient appears well-developed, well-nourished, well-hydrated  HEENT: Head: Normocephalic and atraumatic  Eyes: Conjunctivae and lids are normal  Pupils: Equal, round, reactive to light  Peripheral vascular exam: Femoral: right 2+, left 2+. Posterior tibialis: right 2+ and left 2+. Dorsalis pedis: right 2+ and left 2+. 2+ edema.   Musculoskeletal   Gait and station: Gait evaluation " demonstrated shuffling   Cervical spine: Passive and active range of motion are limited secondary to pain. Extension, lateral flexion, rotation of the cervical spine increased and reproduced pain. Cervical facet joint loading maneuvers are positive.  Muscles: Presence of active trigger points at the levator scapulae   Lumbar spine: Passive and active range of motion are limited secondary to pain. Extension of the lumbar spine increased and reproduced pain. Lumbar facet joint loading maneuvers are positive.  Roly test and Gaenslen's test are negative   Piriformis maneuvers are negative   Hip joints: The range of motion of the hip joints is slightly limited to flexion and internal rotation bilaterally but without   Palpation of the bilateral greater trochanter, reveals tenderness bilaterally.    Examination of the Iliotibial band: reveals tenderness bilaterally.    Neurological:   Patient is alert and oriented to person, place, and time.   Speech: Normal.   Cortical function: Normal mental status.   Reflex Scores:  Right brachioradialis: 1+  Left brachioradialis: 1+  Right biceps: 1+  Left biceps: 1+  Right triceps: 1+  Left triceps: 1+  Right patellar: 0+  Left patellar: 0+  Right Achilles: 0+  Left Achilles: 0+  Motor strength: 5/5  Motor Tone: Normal  Involuntary movements: None.   Superficial/Primitive Reflexes: Primitive reflexes were absent.   Right Sr: Absent  Left Sr: Absent  Right ankle clonus: Absent  Left ankle clonus: Absent   Babinsky: Absent  Spurling sign: Negative. Neck tornado test: Negative. Lhermitte sign: Negative. Long tract signs: Negative. Straight leg raising test: Negative.   Sensory exam: Intact to light touch, intact pain and temperature sensation, intact vibration sensation and normal proprioception.   Coordination: Normal finger to nose. Normal balance and negative Romberg's sign   Skin and subcutaneous tissue: Skin is warm and intact. No rash noted. No cyanosis.    Psychiatric: Judgment and insight: Normal. Recent and remote memory: Intact. Mood and affect: Normal.     Analysis of the spinal cord stimulator device with complex spinal cord stimulator reprogramming   Patient used the spinal cord stimulator device 100% of the time. The spinal cord stimulator device was reprogrammed under my direct supervision and 2 new programs were created by adjusting electrode polarities, amplitude, pulse width, and pulse rate, for a total of 2 programs, in the following fashion;  Program A1 (Preferred Program):    Electrode polarities: 3+, 5-, 7-, 9+, 11-, 13+  Amplitude: 1.2 mA     Pulse width: 400 mcs  Rate: 80 Hz    Program A2   Electrode polarities: 4+, 5+, 7+, 14-  Amplitude: 0.8 mA     Pulse width: 300 mcs  Rate: 40 Hz    Patient experienced significant pain relief with coverage with pleasant paresthesia in all areas of chronic pain.  Time spent reprogramming: 15 minutes  A copy of the telemetry report will be scanned in the patient's chart.    ASSESSMENT:   1. Lumbar stenosis with neurogenic claudication    2. Spondylosis of lumbar region without myelopathy or radiculopathy    3. History of compression fracture of vertebral column    4. Cervical spondylosis without myelopathy    5. Bilateral primary osteoarthritis of hip    6. History of left knee replacement    7. Leg length discrepancy    8. Pulmonary emphysema, unspecified emphysema type (HCC)    9. Osteoporosis, unspecified osteoporosis type, unspecified pathological fracture presence    10. Rheumatoid arthritis involving right shoulder with positive rheumatoid factor (HCC)    11. Physical deconditioning    12. Presence of neurostimulator    13. Encounter for fitting and adjustment of neuropacemaker of spinal cord         PLAN/MEDICAL DECISION MAKING:  Ms. Lisa Crowley, 77 y.o. female who presents with a longstanding history of chronic lower back pain refractory to conservative measures. Patient underwent implantation of  a spinal cord stimulator device with Dr. Faraz Aguilera, which has been extremely successful in controlling her chronic pain.  She underwent uneventful IPG exchange with Dr. Aguilera on 04/06/2022. Lisa was supposed to follow-up with us for maintenance of her stimulator device.  However, we did not hear back from her until about 2-3 weeks ago. A [employer] representative met with her for analysis of her spinal cord stimulator device and confirmed that the spinal cord stimulator was controlling some of her chronic pain although it needed to be reprogrammed. Patient told the representative that she thought that she was experiencing a new type of pain and requested to be seen by me in the office for evaluation. She complains of bilateral lower back and hip pain radiating down both lower extremities.  She denied recent falls or accidents or new diagnostic studies prior to her call 2-3 weeks ago. She reports that she has been dealing with severe chronic bilateral lower extremity edema, from which she has been seen by her primary care physician as well as Dr. Forde. I ordered pertinent diagnostic studies in preparation for today's visit. CT scan of the lumbar spine without contrast on 3/2/2023 revealed chronic compression deformities at T11 and L1.  Advanced multilevel discogenic degenerative changes throughout the lumbar spine, facet hypertrophy. Multilevel canal and neuroforaminal stenosis most pronounced at the L4-L5 level with moderate to severe canal stenosis and moderate to severe bilateral neuroforaminal stenosis. Bilateral hip x-rays on 3/9/2023 revealed bilateral hip arthrosis.  Degenerative changes of the sacroiliac joints and pubic symphysis. Flexion and extension x-rays of the lumbar spine on 1/5/2022 revealed multilevel degenerative changes of the lumbar spine with osteopenic lumbar spine and limited range of motion without evidence of instability. Patient has failed to obtain pain relief with conservative  measures including oral analgesics, opioids, topical analgesics, ice, heat, TENs, physical therapy (within the past year), physical therapist directed home exercise program HEP (ongoing), to name a few. Pain appears to be related to her lumbar stenosis causing severe radicular pain and neurogenic claudication. A comprehensive evaluation including history and physical exam along with pertinent physiologic and functional assessment was performed. Patient presents with intractable pain due to the diagnoses listed above. Patient has failed to respond to conservative modalities, as referenced under HPI including the impact of patient's moderate-to-severe pain contributing to significant impairment in daily activities, ADLs, and a negative impact on quality of life. Supporting diagnostic studies of patient's chronic pain condition have been reviewed. I have reviewed all available patient's medical records as well as previous therapies as referenced above. I had a lengthy conversation with Ms. Lisa Crowley regarding her chronic pain condition and potential therapeutic options including risks, benefits, alternative therapies, to name a few. We have discussed using a stepwise approach starting with the shortest or least intense level of treatment, care, or service as determined by the extent required to diagnose and or treat patient's condition. The treatments proposed are consistent with the patient's medical condition and are known to be as safe and effective by current guidelines and standard of care. There is no evidence of absolute contraindications for the proposed procedures under the current circumstances. These treatments are not considered experimental or investigational. The duration and frequency proposed are considered appropriate for the service in accordance with accepted standards of medical practice for the diagnosis and or treatment of the patient's condition and or intended to improve the  patient's level of function. These services will be furnished in a setting appropriate to the patient's medical needs and condition. Therefore, I have proposed the following plan:  1. Interventional pain management measures: Follow-up with me on an as needed basis (please let me know before scheduling). If she continues to struggle with pain, we have already discussed the possibility of performing diagnostic and therapeutic bilateral L4-L5 transforaminal epidural steroid injections using the lowest effective dose of steroids, under C-arm fluoroscopic guidance, with the use of contrast dye (unless contraindicated) to confirm appropriate needle placement and spread of contrast dye. We may repeat therapeutic bilateral L4-L5 transforaminal epidural steroid injections depending on patient's outcome.  As per current guidelines, epidurals will be limited to a maximum of 4 sessions per spinal region in a rolling twelve (12) month period. Continuation of epidural steroid injections over 12 months would only be considered under the following provisions;  • Patient is a high-risk surgical candidate, or the patient does not desire surgery, or recurrence of pain in the same location relieved with ESIs for at least three months and epidural provides at least 50% sustained improvement of pain and/or 50% objective improvement in function (using same scale as baseline)  • Pain is severe enough to cause a significant degree of functional disability or vocational disability  • The primary care provider will be notified regarding continuation of procedures and repeat steroid use   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 Pharmacological measures: Reviewed and discussed; Patient takes Norco. Patient also takes alendronate, bupropion, pramipexole (Mirapex), Seroquel, Requip  3. Long-term rehabilitation efforts:  A. The patient does not have a history of falls but has risk  factors for falls. I did complete a risk assessment for falls. Fall precautions: Patient has been instructed regarding universal fall precautions, such as;   • Using gait aids a cane or walker at the appropriate height at all times for ambulation   • Removing all area rugs and coffee tables to create a safe environment at home  • Ensure clean, dry floors  • Wearing supportive footwear and properly fitting clothing  • Ensure bed/chair is appropriate height and patient's feet can touch the floor  • Using a shower transfer bench  • Using walk-in shower and having shower safety bars installed  • Ensure proper lighting, minimize glare  • Have nightlights operational and in use  • Participation in an exercise program for gait training, balance training and strength  • Avoid carrying laundry up and down steps  • Ensure proper compliance and organization of medications to avoid errors   • Avoid use of over the counter sedatives and alcohol consumption  • Ensure easy access to call bell, glasses, TV control, telephone  • Ensure glasses/hearing aids are in use or close by (on top of night table)  B. Patient has declined participation in a comprehensive physical therapy program due to her lung issues and other comorbidities  C. If possible, start an exercise program such as water therapy  D. Contrast therapy: Apply ice-packs for 15-20 minutes, followed by heating pads for 15-20 minutes to affected area   E. Referral to [facility]. Patient's Body mass index is 40.64 kg/m². Patient counseled on the importance of weight loss to help with overall health and pain control.   F. Lisa Crowley  reports that she has never smoked. She has never used smokeless tobacco.   5. The patient has been instructed to contact my office with any questions or difficulties. The patient understands the plan and agrees to proceed accordingly.    The patient has a documented plan of care to address chronic  pain. Lisa Crowley reports a pain score of 8/10.  Given her pain assessment as noted, treatment options were discussed and the following options were decided upon as a follow-up plan to address the patient's pain: continuation of current treatment plan for pain, educational materials on pain management, home exercises and therapy, prescription for non-opiod analgesics, referral to Physical Therapy, referral to specialist for assistance in pain treatment guidance, steroid injections, use of non-medical modalities (ice, heat, stretching and/or behavior modifications) and interventional pain management measures.            Pain Management Panel    There is no flowsheet data to display.        CELESTINO query complete. CELESTINO reviewed by Hugh Martinez MD.     Pain Medications             buPROPion XL (WELLBUTRIN XL) 300 MG 24 hr tablet Take 1 tablet by mouth Daily.    HYDROcodone-acetaminophen (NORCO) 5-325 MG per tablet TAKE 1 TABLET BY MOUTH UP TO TWICE A DAY AS NEEDED    QUEtiapine (SEROquel) 50 MG tablet Take 1 tablet every day by mouth at bedtime.         Time spent reviewing diagnostic studies, consultation reports, previous treatments, pre-chartin minutes  Time spent face-to-face with the patient:  44 minutes  Time spent ordering diagnostic studies, referrals, and writing this note: 8 minutes  Total Time: 63 minutes    Please note that portions of this note were completed with a voice recognition program.     Hugh Martinez MD    Patient Care Team:  Ronan Umanzor MD as PCP - General (Pediatrics)  Faraz Aguilera MD as Surgeon (Neurosurgery)  Hugh Martinez MD as Consulting Physician (Anesthesiology)     No orders of the defined types were placed in this encounter.        No future appointments.

## 2023-03-15 PROBLEM — M16.0 BILATERAL PRIMARY OSTEOARTHRITIS OF HIP: Status: ACTIVE | Noted: 2023-03-15

## 2023-03-16 ENCOUNTER — OFFICE VISIT (OUTPATIENT)
Dept: PAIN MEDICINE | Facility: CLINIC | Age: 78
End: 2023-03-16
Payer: MEDICARE

## 2023-03-16 VITALS
HEIGHT: 59 IN | OXYGEN SATURATION: 97 % | SYSTOLIC BLOOD PRESSURE: 136 MMHG | TEMPERATURE: 95.5 F | DIASTOLIC BLOOD PRESSURE: 81 MMHG | WEIGHT: 201.2 LBS | BODY MASS INDEX: 40.56 KG/M2 | HEART RATE: 89 BPM

## 2023-03-16 DIAGNOSIS — R53.81 PHYSICAL DECONDITIONING: ICD-10-CM

## 2023-03-16 DIAGNOSIS — M47.812 CERVICAL SPONDYLOSIS WITHOUT MYELOPATHY: ICD-10-CM

## 2023-03-16 DIAGNOSIS — M05.711 RHEUMATOID ARTHRITIS INVOLVING RIGHT SHOULDER WITH POSITIVE RHEUMATOID FACTOR: ICD-10-CM

## 2023-03-16 DIAGNOSIS — Z96.82 PRESENCE OF NEUROSTIMULATOR: ICD-10-CM

## 2023-03-16 DIAGNOSIS — M48.062 LUMBAR STENOSIS WITH NEUROGENIC CLAUDICATION: ICD-10-CM

## 2023-03-16 DIAGNOSIS — J43.9 PULMONARY EMPHYSEMA, UNSPECIFIED EMPHYSEMA TYPE: ICD-10-CM

## 2023-03-16 DIAGNOSIS — Z46.2 ENCOUNTER FOR FITTING AND ADJUSTMENT OF NEUROPACEMAKER OF SPINAL CORD: ICD-10-CM

## 2023-03-16 DIAGNOSIS — M16.0 BILATERAL PRIMARY OSTEOARTHRITIS OF HIP: ICD-10-CM

## 2023-03-16 DIAGNOSIS — M81.0 OSTEOPOROSIS, UNSPECIFIED OSTEOPOROSIS TYPE, UNSPECIFIED PATHOLOGICAL FRACTURE PRESENCE: ICD-10-CM

## 2023-03-16 DIAGNOSIS — M21.70 LEG LENGTH DISCREPANCY: ICD-10-CM

## 2023-03-16 DIAGNOSIS — Z87.81 HISTORY OF COMPRESSION FRACTURE OF VERTEBRAL COLUMN: ICD-10-CM

## 2023-03-16 DIAGNOSIS — M47.816 SPONDYLOSIS OF LUMBAR REGION WITHOUT MYELOPATHY OR RADICULOPATHY: ICD-10-CM

## 2023-03-16 DIAGNOSIS — Z96.652 HISTORY OF LEFT KNEE REPLACEMENT: ICD-10-CM

## 2023-03-16 DIAGNOSIS — E66.8 MODERATE OBESITY: Primary | ICD-10-CM

## 2023-03-16 PROCEDURE — 1125F AMNT PAIN NOTED PAIN PRSNT: CPT | Performed by: ANESTHESIOLOGY

## 2023-03-16 PROCEDURE — 99215 OFFICE O/P EST HI 40 MIN: CPT | Performed by: ANESTHESIOLOGY

## 2023-03-16 PROCEDURE — 1159F MED LIST DOCD IN RCRD: CPT | Performed by: ANESTHESIOLOGY

## 2023-03-16 PROCEDURE — 1160F RVW MEDS BY RX/DR IN RCRD: CPT | Performed by: ANESTHESIOLOGY

## 2023-03-16 PROCEDURE — 95972 ALYS CPLX SP/PN NPGT W/PRGRM: CPT | Performed by: ANESTHESIOLOGY

## 2023-03-16 RX ORDER — ALENDRONATE SODIUM 70 MG/1
TABLET ORAL
COMMUNITY
Start: 2023-01-23

## 2023-03-16 RX ORDER — PRAMIPEXOLE DIHYDROCHLORIDE 1 MG/1
TABLET ORAL
COMMUNITY
Start: 2023-01-23

## 2023-03-16 RX ORDER — FLUTICASONE FUROATE, UMECLIDINIUM BROMIDE AND VILANTEROL TRIFENATATE 200; 62.5; 25 UG/1; UG/1; UG/1
POWDER RESPIRATORY (INHALATION)
COMMUNITY
Start: 2022-11-23

## 2023-03-16 RX ORDER — MONTELUKAST SODIUM 10 MG/1
TABLET ORAL
COMMUNITY
Start: 2023-01-23

## 2023-03-16 RX ORDER — HYDROCODONE BITARTRATE AND ACETAMINOPHEN 5; 325 MG/1; MG/1
TABLET ORAL
COMMUNITY
Start: 2023-02-27

## 2023-03-16 RX ORDER — ROPINIROLE 3 MG/1
3 TABLET, FILM COATED ORAL 4 TIMES DAILY
COMMUNITY

## 2023-03-16 RX ORDER — LOSARTAN POTASSIUM 100 MG/1
100 TABLET ORAL DAILY
COMMUNITY

## 2023-03-16 RX ORDER — QUETIAPINE FUMARATE 50 MG/1
TABLET, FILM COATED ORAL
COMMUNITY
Start: 2023-01-23

## 2023-03-16 RX ORDER — BUPROPION HYDROCHLORIDE 300 MG/1
300 TABLET ORAL DAILY
COMMUNITY

## 2024-03-22 ENCOUNTER — OFFICE VISIT (OUTPATIENT)
Dept: PULMONOLOGY | Facility: CLINIC | Age: 79
End: 2024-03-22
Payer: MEDICARE

## 2024-03-22 VITALS
SYSTOLIC BLOOD PRESSURE: 140 MMHG | OXYGEN SATURATION: 98 % | HEART RATE: 69 BPM | DIASTOLIC BLOOD PRESSURE: 89 MMHG | WEIGHT: 201 LBS | RESPIRATION RATE: 18 BRPM | HEIGHT: 59 IN | BODY MASS INDEX: 40.52 KG/M2

## 2024-03-22 DIAGNOSIS — G47.33 OSA (OBSTRUCTIVE SLEEP APNEA): ICD-10-CM

## 2024-03-22 DIAGNOSIS — J44.9 STAGE 3 SEVERE COPD BY GOLD CLASSIFICATION: Primary | ICD-10-CM

## 2024-03-22 DIAGNOSIS — I89.0 LYMPHEDEMA: ICD-10-CM

## 2024-03-22 DIAGNOSIS — Z29.11 NEED FOR RSV VACCINATION: ICD-10-CM

## 2024-03-22 DIAGNOSIS — R91.1 LUNG NODULE SEEN ON IMAGING STUDY: ICD-10-CM

## 2024-03-22 PROCEDURE — 99204 OFFICE O/P NEW MOD 45 MIN: CPT | Performed by: INTERNAL MEDICINE

## 2024-03-22 RX ORDER — ROFLUMILAST 250 UG/1
250 TABLET ORAL DAILY
Qty: 28 TABLET | Refills: 3 | Status: SHIPPED | OUTPATIENT
Start: 2024-03-22

## 2024-03-22 RX ORDER — ALBUTEROL SULFATE 90 UG/1
1 AEROSOL, METERED RESPIRATORY (INHALATION)
Qty: 17 G | Refills: 5 | Status: SHIPPED | OUTPATIENT
Start: 2024-03-22

## 2024-03-22 RX ORDER — IPRATROPIUM BROMIDE AND ALBUTEROL SULFATE 2.5; .5 MG/3ML; MG/3ML
SOLUTION RESPIRATORY (INHALATION)
COMMUNITY
Start: 2023-12-27

## 2024-03-22 RX ORDER — HYDRALAZINE HYDROCHLORIDE 10 MG/1
10 TABLET, FILM COATED ORAL
COMMUNITY
Start: 2023-12-04

## 2024-03-22 RX ORDER — PREDNISONE 20 MG/1
2 TABLET ORAL DAILY
COMMUNITY
Start: 2024-01-02 | End: 2024-03-22

## 2024-03-22 RX ORDER — ALBUTEROL SULFATE 90 UG/1
1 AEROSOL, METERED RESPIRATORY (INHALATION)
COMMUNITY
End: 2024-03-22 | Stop reason: SDUPTHER

## 2024-03-22 RX ORDER — FLUTICASONE FUROATE, UMECLIDINIUM BROMIDE AND VILANTEROL TRIFENATATE 200; 62.5; 25 UG/1; UG/1; UG/1
1 POWDER RESPIRATORY (INHALATION)
Qty: 30 EACH | Refills: 5 | Status: SHIPPED | OUTPATIENT
Start: 2024-03-22

## 2024-03-22 RX ORDER — CLONIDINE HYDROCHLORIDE 0.1 MG/1
0.1 TABLET ORAL
COMMUNITY

## 2024-03-22 NOTE — PROGRESS NOTES
New Pulmonary Patient Office Visit      Patient Name: Lisa Crowley    Referring Physician: Lisbeth Escalante,*    Chief Complaint:    Chief Complaint   Patient presents with    Breathing Problem    Consult       History of Present Illness: Lisa Crowley is a 78 y.o. female who is here today to establish care with Pulmonary.       Duration: COPD diagnosed > 5 years  Severity: moderate with 3 exacerbations over the last 6 months  Associated Symptoms: chronic cough with clear mucus, no hemoptysis, + intermittent wheezing  Exercise Tolerance: able to walk on flat ground for 100 ft for the last 6 months  Timing: daily  Exacerbating Factors: exertion, weather changes especially rainy weather  Relieving Factors: rest and inhalers    She had to go to [facility] in the 1990[facility] due to her chronic cough.  States that they could only find a hiatal hernia that has repaired twice.     Current Regimen:   Inhalers: Trelegy, albuterol prn- rarely uses it  Nebs: duo-nebs 2x per day    Supplemental Oxygen:nocturnal oxygen since about 2018 and recently started on 2lnc during the day due to 6mw  DME Company: can't recall, but has POC    She was diagnosed with obstructive sleep apnea several years ago, but has been unable to tolerate CPAP.  Does not want to try again.    She also sees Dr. Forde for her heart.  States she has never required a heart cath or stents.  States she has been told nothing is wrong with her heart, but does have chronic lymphedema and has compression devices to help with this.  States she was also sent to vascular at some point for intervention to try to help with the lymphedema.    Subjective      Review of Systems:   Review of Systems   Respiratory:  Positive for cough, shortness of breath and wheezing.    Cardiovascular:  Positive for leg swelling. Negative for chest pain.       Past Medical History:   Past Medical History:   Diagnosis Date    Arthritis     Asthma     Cancer     skin     Cervical disc disorder Jan 2023    Chronic pain disorder     COPD (chronic obstructive pulmonary disease)     Fractures 2022    History of colon polyps 2017    Hypertension     Joint pain 2022    Low back pain     Osteoporosis     Spinal stenosis 2022       Past Surgical History:   Past Surgical History:   Procedure Laterality Date    COLECTOMY PARTIAL / TOTAL  2017    COLONOSCOPY      ESOPHAGUS SURGERY      Hiatal hernia repair times two    JOINT REPLACEMENT  2021    SHOULDER SURGERY Left     SKIN CANCER EXCISION      SPINAL CORD STIMULATOR IMPLANT  07/17/2014    T8-9 Lead Placement - Dr. Faraz Aguilera    SPINAL CORD STIMULATOR IMPLANT N/A 04/06/2022    Procedure: SPINAL CORD STIMULATOR battery change out.;  Surgeon: Faraz Aguilera MD;  Location: [facility];  Service: Neurosurgery;  Laterality: N/A;    WRIST FUSION Left        Family History:   Family History   Problem Relation Age of Onset    Heart disease Mother     Hypertension Mother     Hyperlipidemia Mother     Alzheimer's disease Sister        Social History:   Social History     Socioeconomic History    Marital status:    Tobacco Use    Smoking status: Never    Smokeless tobacco: Never   Vaping Use    Vaping status: Never Used   Substance and Sexual Activity    Alcohol use: Never    Drug use: Never    Sexual activity: Not Currently     Partners: Female     Birth control/protection: Surgical       Medications:     Current Outpatient Medications:     albuterol (PROVENTIL) (2.5 MG/3ML) 0.083% nebulizer solution, Albuterol Sulfate (2.5 MG/3ML) 0.083% Inhalation Nebulization Solution; Patient Sig: Albuterol Sulfate (2.5 MG/3ML) 0.083% Inhalation Nebulization Solution USE 1 UNIT DOSE EVERY 4-6 HOURS AS NEEDED FOR WHEEZING .; 0; 09-Apr-2014; Active, Disp: , Rfl:     albuterol sulfate  (90 Base) MCG/ACT inhaler, Inhale 1 puff 4 (Four) Times a Day., Disp: 17 g, Rfl: 5    alendronate (FOSAMAX) 70 MG tablet, take 1 tablet (70 mg) by mouth once  "weekly in the morning, at least 30 min before first food, beverage, or medication of day, Disp: , Rfl:     bumetanide (BUMEX) 2 MG tablet, Take 2 tablets by mouth Daily. 2 tablets daily, Disp: , Rfl:     buPROPion XL (WELLBUTRIN XL) 300 MG 24 hr tablet, Take 1 tablet by mouth Daily., Disp: , Rfl:     calcium carbonate (OS-JAMES) 600 MG tablet, Take  by mouth., Disp: , Rfl:     carvedilol (COREG) 25 MG tablet, Take 1 tablet by mouth 2 (Two) Times a Day With Meals., Disp: , Rfl:     cloNIDine (CATAPRES) 0.1 MG tablet, Take 1 tablet by mouth., Disp: , Rfl:     Diclofenac Sodium (VOLTAREN) 1 % gel gel, , Disp: , Rfl:     hydrALAZINE (APRESOLINE) 10 MG tablet, Take 1 tablet by mouth., Disp: , Rfl:     ipratropium-albuterol (DUO-NEB) 0.5-2.5 mg/3 ml nebulizer, inhale 3 milliliters by mouth via nebulizer 4 times per day, Disp: , Rfl:     losartan (COZAAR) 100 MG tablet, Take 1 tablet by mouth Daily., Disp: , Rfl:     montelukast (SINGULAIR) 10 MG tablet, Take 1 tablet every day by mouth at bedtime., Disp: , Rfl:     O2 (OXYGEN), Inhale 2 L into the lungs nightly, Disp: , Rfl:     potassium chloride ER (K-TAB) 20 MEQ tablet controlled-release ER tablet, Take 1 tablet by mouth Daily., Disp: , Rfl:     pramipexole (MIRAPEX) 1 MG tablet, Take 1 tablet every day by mouth at bedtime for 90 days., Disp: , Rfl:     QUEtiapine (SEROquel) 50 MG tablet, Take 1 tablet every day by mouth at bedtime., Disp: , Rfl:     Trelegy Ellipta 200-62.5-25 MCG/ACT inhaler, Inhale 1 puff Daily., Disp: 30 each, Rfl: 5    roflumilast (Daliresp) 250 MCG tablet tablet, Take 1 tablet by mouth Daily., Disp: 28 tablet, Rfl: 3    RSVPreF3 Vac Recomb Adjuvanted (AREXVY) 120 MCG/0.5ML reconstituted suspension injection, Inject 0.5 mL into the appropriate muscle as directed by prescriber 1 (One) Time for 1 dose., Disp: 0.5 mL, Rfl: 0    Allergies:   Allergies   Allergen Reactions    Adhesive Tape Rash     \"surgical tape\"       Objective     Physical " "Exam:  Vital Signs:   Vitals:    03/22/24 1012   BP: 140/89   Pulse: 69   Resp: 18   SpO2: 98%  Comment: on RA   Weight: 91.2 kg (201 lb)   Height: 149.9 cm (59\")       Physical Exam  Constitutional:       Appearance: She is not toxic-appearing.   HENT:      Right Ear: External ear normal.      Left Ear: External ear normal.      Nose: No congestion or rhinorrhea.      Mouth/Throat:      Mouth: Mucous membranes are moist.      Pharynx: No oropharyngeal exudate or posterior oropharyngeal erythema.   Eyes:      General:         Right eye: No discharge.         Left eye: Discharge present.     Extraocular Movements: Extraocular movements intact.   Cardiovascular:      Rate and Rhythm: Normal rate and regular rhythm.      Heart sounds: No murmur heard.  Pulmonary:      Breath sounds: Wheezing present. No rhonchi.   Abdominal:      Palpations: Abdomen is soft.   Musculoskeletal:      Cervical back: Normal range of motion.      Right lower leg: Edema present.      Left lower leg: Edema present.      Comments: Bilateral chronic venous stasis changes noted   Lymphadenopathy:      Cervical: No cervical adenopathy.   Skin:     Findings: No rash.   Neurological:      General: No focal deficit present.      Mental Status: She is alert.   Psychiatric:         Mood and Affect: Mood normal.         Behavior: Behavior normal.         Thought Content: Thought content normal.         Judgment: Judgment normal.       Results Review:   Labs: Reviewed.  Lab Results   Component Value Date    WBC 6.21 04/04/2022    HGB 11.3 (L) 04/04/2022    HCT 35.1 04/04/2022    MCV 95.6 04/04/2022     04/04/2022     Lab Results   Component Value Date    GLUCOSE 120 (H) 08/06/2014    BUN 25 (H) 08/06/2014    CREATININE 1.1 08/06/2014    EGFR 50 08/06/2014    K 3.9 04/04/2022    CO2 34 (H) 08/06/2014    CALCIUM 9.1 08/06/2014 November 2020 sodium 139, potassium 3.8, chloride 100, bicarb 29, creatinine 1.1, alk phos 73, ALT 28, AST 23    No " "results found for: \"CBCDIF\", \"CMP\"     Micro: As of March 22, 2024   No results found for: \"RESPCX\"  No results found for: \"BLOODCX\"  No results found for: \"URINECX\"  No results found for: \"MRSACX\"  Lab Results   Component Value Date    MRSAPCR Positive (A) 04/04/2022     No results found for: \"URCX\"  No components found for: \"LOWRESPCF\"  No results found for: \"THROATCX\"  No results found for: \"CULTURES\"  No components found for: \"STREPBCX\"  No results found for: \"STREPPNEUAG\"  No results found for: \"LEGIONELLA\"  No results found for: \"MYCOPLASCX\"  No results found for: \"GCCX\"  No results found for: \"WOUNDCX\"  No results found for: \"BODYFLDCX\"    ABG: No results found for: \"PHART\", \"CUD2TPB\", \"PO2ART\", \"HGBBG\", \"Z4RMYAPY\", \"CFIO2\", \"FCOHB\", \"CARBOXYHGB\", \"FMETHB\"    Echo: None available for review    Radiology Scans:   Last CT scan was reviewed in great detail with the patient.     December 2023 CT scan of the chest showed no lymphadenopathy or effusions.  Subpleural right lower lobe 6 mm nodule which is unchanged when compared to November 2023.  Several less than 5 mm right lower lobe nodules are also stable.  Improving groundglass opacities.      PFT IMPRESSION:   None available for review    Assessment / Plan      Assessment/Plan:    1. Stage 3 severe COPD by GOLD classification  Appears severe secondary to symptom control as well as frequent exacerbations over the last 6 months despite triple therapy.  Will try trial of low-dose Daliresp.  Discussed risk, benefits and possible side effects of medication to see if this would help improve symptom control and decrease exacerbation rate.    Request records of old PFTs from [facility].    Repeat PFTs at next clinic visit    - Trelegy Ellipta 200-62.5-25 MCG/ACT inhaler; Inhale 1 puff Daily.  Dispense: 30 each; Refill: 5  - albuterol sulfate  (90 Base) MCG/ACT inhaler; Inhale 1 puff 4 (Four) Times a Day.  Dispense: 17 g; Refill: 5  - Complete PFT - Pre & " Post Bronchodilator; Future  - roflumilast (Daliresp) 250 MCG tablet tablet; Take 1 tablet by mouth Daily.  Dispense: 28 tablet; Refill: 3    2. Lung nodule seen on imaging study  Needs 6-month follow-up CT scan and this was ordered  - CT Chest Without Contrast; Future    3. MYRA (obstructive sleep apnea)  Unable to tolerate CPAP    4. Lymphedema  Request records from cardiology including echo.    5. Need for RSV vaccination  Patient meets criteria for RSV vaccine.  Already had flu shot this year.  - RSVPreF3 Vac Recomb Adjuvanted (AREXVY) 120 MCG/0.5ML reconstituted suspension injection; Inject 0.5 mL into the appropriate muscle as directed by prescriber 1 (One) Time for 1 dose.  Dispense: 0.5 mL; Refill: 0       Follow Up:   Return in about 3 months (around 6/22/2024) for PFT day of clinic appointment.    Ly Seth MD  Pulmonary/Critical Care Physician   [place]      Please note that portions of this note may have been completed with a voice recognition program. Efforts were made to edit the dictations, but occasionally words are mistranscribed.

## 2024-03-26 ENCOUNTER — PATIENT ROUNDING (BHMG ONLY) (OUTPATIENT)
Dept: PULMONOLOGY | Facility: CLINIC | Age: 79
End: 2024-03-26
Payer: MEDICARE

## 2024-03-27 DIAGNOSIS — R06.09 DYSPNEA ON EXERTION: ICD-10-CM

## 2024-03-27 DIAGNOSIS — G47.33 OSA (OBSTRUCTIVE SLEEP APNEA): Primary | ICD-10-CM

## 2024-04-08 DIAGNOSIS — I89.0 LYMPHEDEMA: Primary | ICD-10-CM

## 2024-05-07 ENCOUNTER — APPOINTMENT (OUTPATIENT)
Dept: CARDIOLOGY | Facility: HOSPITAL | Age: 79
End: 2024-05-07
Payer: MEDICARE

## 2024-05-07 ENCOUNTER — HOSPITAL ENCOUNTER (OUTPATIENT)
Dept: PULMONOLOGY | Facility: HOSPITAL | Age: 79
Discharge: HOME | End: 2024-05-07
Admitting: INTERNAL MEDICINE
Payer: MEDICARE

## 2024-05-07 DIAGNOSIS — J44.9 STAGE 3 SEVERE COPD BY GOLD CLASSIFICATION: ICD-10-CM

## 2024-05-07 PROCEDURE — 94726 PLETHYSMOGRAPHY LUNG VOLUMES: CPT

## 2024-05-07 PROCEDURE — 94726 PLETHYSMOGRAPHY LUNG VOLUMES: CPT | Performed by: INTERNAL MEDICINE

## 2024-05-07 PROCEDURE — 94060 EVALUATION OF WHEEZING: CPT

## 2024-05-07 PROCEDURE — 94060 EVALUATION OF WHEEZING: CPT | Performed by: INTERNAL MEDICINE

## 2024-05-07 PROCEDURE — 94729 DIFFUSING CAPACITY: CPT

## 2024-05-07 PROCEDURE — 94729 DIFFUSING CAPACITY: CPT | Performed by: INTERNAL MEDICINE

## 2024-05-07 PROCEDURE — 94640 AIRWAY INHALATION TREATMENT: CPT

## 2024-05-07 RX ORDER — ALBUTEROL SULFATE 2.5 MG/3ML
2.5 SOLUTION RESPIRATORY (INHALATION) ONCE
Status: COMPLETED | OUTPATIENT
Start: 2024-05-07 | End: 2024-05-07

## 2024-05-07 RX ADMIN — ALBUTEROL SULFATE 2.5 MG: 2.5 SOLUTION RESPIRATORY (INHALATION) at 09:53

## 2024-07-05 ENCOUNTER — OFFICE VISIT (OUTPATIENT)
Dept: PULMONOLOGY | Facility: CLINIC | Age: 79
End: 2024-07-05
Payer: MEDICARE

## 2024-07-05 VITALS
SYSTOLIC BLOOD PRESSURE: 146 MMHG | DIASTOLIC BLOOD PRESSURE: 82 MMHG | HEART RATE: 52 BPM | HEIGHT: 59 IN | RESPIRATION RATE: 18 BRPM | WEIGHT: 201 LBS | OXYGEN SATURATION: 91 % | BODY MASS INDEX: 40.52 KG/M2

## 2024-07-05 DIAGNOSIS — J98.11 ATELECTASIS: ICD-10-CM

## 2024-07-05 DIAGNOSIS — G47.33 OSA (OBSTRUCTIVE SLEEP APNEA): ICD-10-CM

## 2024-07-05 DIAGNOSIS — J96.12 CHRONIC RESPIRATORY FAILURE WITH HYPERCAPNIA: ICD-10-CM

## 2024-07-05 DIAGNOSIS — J44.9 STAGE 3 SEVERE COPD BY GOLD CLASSIFICATION: Primary | ICD-10-CM

## 2024-07-05 PROCEDURE — 1159F MED LIST DOCD IN RCRD: CPT | Performed by: INTERNAL MEDICINE

## 2024-07-05 PROCEDURE — 99214 OFFICE O/P EST MOD 30 MIN: CPT | Performed by: INTERNAL MEDICINE

## 2024-07-05 PROCEDURE — 1160F RVW MEDS BY RX/DR IN RCRD: CPT | Performed by: INTERNAL MEDICINE

## 2024-07-05 RX ORDER — MONTELUKAST SODIUM 10 MG/1
10 TABLET ORAL NIGHTLY
COMMUNITY
Start: 2024-04-30 | End: 2024-07-05

## 2024-07-05 RX ORDER — CLONIDINE HYDROCHLORIDE 0.1 MG/1
0.1 TABLET ORAL 2 TIMES DAILY
COMMUNITY
Start: 2024-04-30 | End: 2024-07-05

## 2024-07-05 RX ORDER — HYDRALAZINE HYDROCHLORIDE 10 MG/1
10 TABLET, FILM COATED ORAL 3 TIMES DAILY
COMMUNITY
Start: 2024-04-30 | End: 2024-07-05 | Stop reason: SDUPTHER

## 2024-07-05 RX ORDER — ROPINIROLE 3 MG/1
1 TABLET, FILM COATED ORAL 4 TIMES DAILY
COMMUNITY

## 2024-07-05 RX ORDER — PRAMIPEXOLE DIHYDROCHLORIDE 1 MG/1
1 TABLET ORAL DAILY
COMMUNITY
Start: 2024-04-30 | End: 2024-07-05

## 2024-07-05 RX ORDER — CARVEDILOL 25 MG/1
25 TABLET ORAL 2 TIMES DAILY WITH MEALS
COMMUNITY
Start: 2024-04-30 | End: 2024-07-05 | Stop reason: SDUPTHER

## 2024-07-05 RX ORDER — LOSARTAN POTASSIUM 100 MG/1
100 TABLET ORAL DAILY
COMMUNITY
Start: 2024-04-30 | End: 2024-07-05 | Stop reason: SDUPTHER

## 2024-07-05 RX ORDER — ALENDRONATE SODIUM 70 MG/1
70 TABLET ORAL
COMMUNITY
Start: 2024-04-30 | End: 2024-07-05 | Stop reason: SDUPTHER

## 2024-07-05 RX ORDER — BUPROPION HYDROCHLORIDE 300 MG/1
300 TABLET ORAL EVERY MORNING
COMMUNITY
Start: 2024-04-30 | End: 2024-07-05 | Stop reason: SDUPTHER

## 2024-07-05 RX ORDER — POTASSIUM CHLORIDE 20 MEQ/1
20 TABLET, EXTENDED RELEASE ORAL DAILY
COMMUNITY

## 2024-07-05 RX ORDER — TRAMADOL HYDROCHLORIDE 50 MG/1
TABLET ORAL
COMMUNITY
End: 2024-07-05

## 2024-07-05 RX ORDER — BUMETANIDE 2 MG/1
2 TABLET ORAL DAILY
COMMUNITY
Start: 2024-04-30 | End: 2024-07-05 | Stop reason: SDUPTHER

## 2024-07-05 RX ORDER — IPRATROPIUM BROMIDE AND ALBUTEROL SULFATE 2.5; .5 MG/3ML; MG/3ML
3 SOLUTION RESPIRATORY (INHALATION)
COMMUNITY
Start: 2024-04-30 | End: 2024-07-05 | Stop reason: SDUPTHER

## 2024-07-05 RX ORDER — PREDNISONE 20 MG/1
2 TABLET ORAL DAILY
COMMUNITY
End: 2024-07-05

## 2024-07-05 NOTE — PROGRESS NOTES
Pulmonary follow-up office Visit      Patient Name: Lisa Crowley    Chief Complaint:    Chief Complaint   Patient presents with    Breathing Problem    Follow-up       Subjective: Lisa Crowley is a 79 y.o. female who is here today for follow-up on COPD.    Since last clinic visit, she was admitted in April to [facility] for COPD exacerbation secondary to parainfluenza infection.  During that admission had to be seen by infectious disease secondary to sputum culture growing Aspergillus that was felt to be colonization and no evidence of infection.  No indication for treatment.  She did not require mechanical ventilation, but did require NIV and was sent home with a trilogy machine, but she states she cannot tolerate it.  Plans to return it and does not want to continue with it.  Previously had been unable to tolerate her CPAP.    I had ordered CT scan after last clinic visit, but had CT scan done during recent hospitalization which showed complete collapse of right middle lobe, but patient declines any further follow-up or workup on this.  At this point states that she is spending all of her time going to and from doctors visits and does not want to continue to follow-up with anyone other than her PCP.    Duration: COPD diagnosed > 5 years ago  Severity: moderate to severe with frequent exacerbations   associated Symptoms: chronic cough with intermittent mucus production, no current wheezing  Timing: daily  Exacerbating Factors: exertion, weather changes and infections  Relieving Factors: rest and inhalers    Current Regimen:   Inhalers: Trelegy, albuterol prn- rarely uses it  Nebs: duo-nebs 1-2x per day    Supplemental Oxygen: On 2 L nasal cannula     Subjective      Review of Systems:   Review of Systems   Respiratory:  Positive for cough and shortness of breath. Negative for wheezing.        Social History:   Social History     Socioeconomic History    Marital status:    Tobacco  Use    Smoking status: Never    Smokeless tobacco: Never   Vaping Use    Vaping status: Never Used   Substance and Sexual Activity    Alcohol use: Never    Drug use: Never    Sexual activity: Not Currently     Partners: Female     Birth control/protection: Surgical       Medications:     Current Outpatient Medications:     albuterol (PROVENTIL) (2.5 MG/3ML) 0.083% nebulizer solution, Albuterol Sulfate (2.5 MG/3ML) 0.083% Inhalation Nebulization Solution; Patient Sig: Albuterol Sulfate (2.5 MG/3ML) 0.083% Inhalation Nebulization Solution USE 1 UNIT DOSE EVERY 4-6 HOURS AS NEEDED FOR WHEEZING .; 0; 09-Apr-2014; Active, Disp: , Rfl:     albuterol sulfate  (90 Base) MCG/ACT inhaler, Inhale 1 puff 4 (Four) Times a Day., Disp: 17 g, Rfl: 5    alendronate (FOSAMAX) 70 MG tablet, take 1 tablet (70 mg) by mouth once weekly in the morning, at least 30 min before first food, beverage, or medication of day, Disp: , Rfl:     bumetanide (BUMEX) 2 MG tablet, Take 2 tablets by mouth Daily. 2 tablets daily, Disp: , Rfl:     buPROPion XL (WELLBUTRIN XL) 300 MG 24 hr tablet, Take 1 tablet by mouth Daily., Disp: , Rfl:     Calcium Carb-Cholecalciferol 600-10 MG-MCG chewable tablet, Chew Daily., Disp: , Rfl:     carvedilol (COREG) 25 MG tablet, Take 1 tablet by mouth 2 (Two) Times a Day With Meals., Disp: , Rfl:     hydrALAZINE (APRESOLINE) 10 MG tablet, Take 1 tablet by mouth., Disp: , Rfl:     ipratropium-albuterol (DUO-NEB) 0.5-2.5 mg/3 ml nebulizer, inhale 3 milliliters by mouth via nebulizer 4 times per day, Disp: , Rfl:     losartan (COZAAR) 100 MG tablet, Take 1 tablet by mouth Daily., Disp: , Rfl:     montelukast (SINGULAIR) 10 MG tablet, Take 1 tablet every day by mouth at bedtime., Disp: , Rfl:     O2 (OXYGEN), Inhale 2 L/min., Disp: , Rfl:     pain patient supplied pump, by Other route., Disp: , Rfl:     potassium chloride (KLOR-CON M20) 20 MEQ CR tablet, Take 1 tablet by mouth Daily., Disp: , Rfl:     pramipexole  "(MIRAPEX) 1 MG tablet, Take 1 tablet every day by mouth at bedtime for 90 days., Disp: , Rfl:     QUEtiapine (SEROquel) 50 MG tablet, Take 1 tablet every day by mouth at bedtime., Disp: , Rfl:     roflumilast (Daliresp) 250 MCG tablet tablet, Take 1 tablet by mouth Daily., Disp: 28 tablet, Rfl: 3    rOPINIRole (REQUIP) 3 MG tablet, Take 1 tablet by mouth 4 (Four) Times a Day., Disp: , Rfl:     Trelegy Ellipta 200-62.5-25 MCG/ACT inhaler, Inhale 1 puff Daily., Disp: 30 each, Rfl: 5    Allergies:   Allergies   Allergen Reactions    Adhesive Tape Rash and Unknown - Low Severity     \"surgical tape\"       Objective     Physical Exam:  Vital Signs:   Vitals:    07/05/24 0926   BP: 146/82   Pulse: 52   Resp: 18   SpO2: 91%  Comment: on ra   Weight: 91.2 kg (201 lb)   Height: 149.9 cm (59\")       Physical Exam  Constitutional:       Appearance: She is not toxic-appearing.   HENT:      Right Ear: External ear normal.      Left Ear: External ear normal.      Nose: No congestion or rhinorrhea.      Mouth/Throat:      Mouth: Mucous membranes are moist.      Pharynx: No oropharyngeal exudate or posterior oropharyngeal erythema.   Eyes:      General:         Right eye: No discharge.         Left eye: Discharge present.     Extraocular Movements: Extraocular movements intact.   Cardiovascular:      Rate and Rhythm: Normal rate and regular rhythm.      Heart sounds: No murmur heard.  Pulmonary:      Breath sounds: Wheezing present. No rhonchi.   Abdominal:      Palpations: Abdomen is soft.   Musculoskeletal:      Cervical back: Normal range of motion.      Right lower leg: Edema present.      Left lower leg: Edema present.      Comments: Bilateral chronic venous stasis changes noted   Lymphadenopathy:      Cervical: No cervical adenopathy.   Skin:     Findings: No rash.   Neurological:      General: No focal deficit present.      Mental Status: She is alert.   Psychiatric:         Mood and Affect: Mood normal.         Behavior: " "Behavior normal.         Thought Content: Thought content normal.         Judgment: Judgment normal.       Results Review:   Labs: Reviewed.    April 2024 WBC 7, hemoglobin 10.6, platelets 171, sodium 137, potassium 4.3, chloride 101, bicarb 30, creatinine 1.2.  April 2024 ABG showed 7.41/59/79/37    Lab Results   Component Value Date    WBC 6.21 04/04/2022    HGB 11.3 (L) 04/04/2022    HCT 35.1 04/04/2022    MCV 95.6 04/04/2022     04/04/2022     Lab Results   Component Value Date    GLUCOSE 120 (H) 08/06/2014    BUN 25 (H) 08/06/2014    CREATININE 1.1 08/06/2014    EGFR 50 08/06/2014    K 3.9 04/04/2022    CO2 34 (H) 08/06/2014    CALCIUM 9.1 08/06/2014 November 2020 sodium 139, potassium 3.8, chloride 100, bicarb 29, creatinine 1.1, alk phos 73, ALT 28, AST 23    No results found for: \"CBCDIF\", \"CMP\"     Micro: As of July 5, 2024   No results found for: \"RESPCX\"  No results found for: \"BLOODCX\"  No results found for: \"URINECX\"  No results found for: \"MRSACX\"  Lab Results   Component Value Date    MRSAPCR Positive (A) 04/04/2022     No results found for: \"URCX\"  No components found for: \"LOWRESPCF\"  No results found for: \"THROATCX\"  No results found for: \"CULTURES\"  No components found for: \"STREPBCX\"  No results found for: \"STREPPNEUAG\"  No results found for: \"LEGIONELLA\"  No results found for: \"MYCOPLASCX\"  No results found for: \"GCCX\"  No results found for: \"WOUNDCX\"  No results found for: \"BODYFLDCX\"    ABG: No results found for: \"PHART\", \"QWM3OQS\", \"PO2ART\", \"HGBBG\", \"L3XYXQEQ\", \"CFIO2\", \"FCOHB\", \"CARBOXYHGB\", \"FMETHB\"    Echo:     April 2024 EF 60 to 65%, LV normal size and function.    Radiology Scans:   Last CT scan was reviewed in great detail with the patient.     April 2024 CT chest showed no lymphadenopathy.  Complete atelectasis of right middle lobe with small pleural effusion and atelectasis in the left costophrenic sulcus.    December 2023 CT scan of the chest showed no lymphadenopathy or " effusions.  Subpleural right lower lobe 6 mm nodule which is unchanged when compared to November 2023.  Several less than 5 mm right lower lobe nodules are also stable.  Improving groundglass opacities.      PFT IMPRESSION:   May 2024 PFT showed FEV1 53%, FEV1/FVC ratio 59.  Significant bronchodilator responsiveness noted.  DL/%.  %.  January 2023 PFT showed FEV1 64%, FEV1/FVC ratio 68.  No significant bronchodilator responsiveness.  TLC 97%.  Air-trapping noted.  DL/VA 95%.    Assessment / Plan      Assessment/Plan:    1. Stage 3 severe COPD by GOLD classification  Severe COPD and reviewed recent PFTs with patient.  On appropriate treatment with triple therapy with Trelegy and as needed albuterol.  Is also on Daliresp and denies any significant symptoms and/or side effects from this and wants to continue.  Declines NIV as she is unable to tolerate mask.    Patient has requested that she not follow-up with me but continue to follow with her PCP.  States if she does not have much time left in life she does not want to spend it traveling to and from doctors appointments.    2. MYRA (obstructive sleep apnea)  Unable to tolerate CPAP or trilogy    3. Chronic respiratory failure with hypercapnia  Reviewed ABG with patient and unable to tolerate trilogy machine    4. Atelectasis  Discussed CT scan findings and that it could be related to mucous plugging from infection and/or possible underlying malignancy, but declines further workup and/or follow-up.  States she would not do anything about it at this point.    She states that if she decides to have another CT scan done she would have her PCP order it.    Follow Up:   Return if symptoms worsen or fail to improve.    Ly Seth MD  Pulmonary/Critical Care Physician   [facility]      Please note that portions of this note may have been completed with a voice recognition program. Efforts were made to edit the dictations, but occasionally words are  mistranscribed.

## 2024-08-08 DIAGNOSIS — J44.9 STAGE 3 SEVERE COPD BY GOLD CLASSIFICATION: ICD-10-CM

## 2024-08-08 RX ORDER — ROFLUMILAST 250 UG/1
250 TABLET ORAL DAILY
Qty: 28 TABLET | Refills: 3 | Status: SHIPPED | OUTPATIENT
Start: 2024-08-08

## 2025-01-31 ENCOUNTER — OFFICE VISIT (OUTPATIENT)
Dept: PULMONOLOGY | Facility: CLINIC | Age: 80
End: 2025-01-31
Payer: MEDICARE

## 2025-01-31 VITALS
DIASTOLIC BLOOD PRESSURE: 86 MMHG | WEIGHT: 185 LBS | SYSTOLIC BLOOD PRESSURE: 142 MMHG | BODY MASS INDEX: 37.29 KG/M2 | HEIGHT: 59 IN | HEART RATE: 74 BPM | OXYGEN SATURATION: 91 % | RESPIRATION RATE: 18 BRPM

## 2025-01-31 DIAGNOSIS — G47.33 OSA (OBSTRUCTIVE SLEEP APNEA): ICD-10-CM

## 2025-01-31 DIAGNOSIS — J96.12 CHRONIC RESPIRATORY FAILURE WITH HYPERCAPNIA: ICD-10-CM

## 2025-01-31 DIAGNOSIS — J98.11 ATELECTASIS: ICD-10-CM

## 2025-01-31 DIAGNOSIS — J44.9 STAGE 3 SEVERE COPD BY GOLD CLASSIFICATION: Primary | ICD-10-CM

## 2025-01-31 PROCEDURE — 99214 OFFICE O/P EST MOD 30 MIN: CPT | Performed by: INTERNAL MEDICINE

## 2025-01-31 RX ORDER — PREDNISONE 10 MG/1
TABLET ORAL
COMMUNITY
Start: 2025-01-30

## 2025-01-31 RX ORDER — DOXYCYCLINE HYCLATE 100 MG
1 TABLET ORAL EVERY 12 HOURS SCHEDULED
COMMUNITY
Start: 2025-01-28

## 2025-01-31 RX ORDER — CITALOPRAM HYDROBROMIDE 20 MG/1
1 TABLET ORAL DAILY
COMMUNITY
Start: 2024-12-06 | End: 2025-01-31 | Stop reason: SDUPTHER

## 2025-01-31 RX ORDER — TRIAMCINOLONE ACETONIDE 5 MG/G
OINTMENT TOPICAL
COMMUNITY
Start: 2024-12-14

## 2025-01-31 RX ORDER — PREDNISONE 20 MG/1
2 TABLET ORAL DAILY
COMMUNITY
Start: 2025-01-28

## 2025-01-31 RX ORDER — CITALOPRAM HYDROBROMIDE 40 MG/1
1 TABLET ORAL DAILY
COMMUNITY
Start: 2025-01-16

## 2025-01-31 RX ORDER — LEVOFLOXACIN 750 MG/1
TABLET, FILM COATED ORAL
COMMUNITY
Start: 2024-11-26

## 2025-01-31 RX ORDER — TRIAMCINOLONE ACETONIDE 1 MG/G
OINTMENT TOPICAL 2 TIMES DAILY
COMMUNITY
Start: 2024-11-04

## 2025-01-31 RX ORDER — QUETIAPINE FUMARATE 100 MG/1
100 TABLET, FILM COATED ORAL NIGHTLY
COMMUNITY
Start: 2025-01-16

## 2025-01-31 RX ORDER — BUPROPION HYDROCHLORIDE 300 MG/1
1 TABLET ORAL DAILY
COMMUNITY
End: 2025-01-31 | Stop reason: SDUPTHER

## 2025-01-31 RX ORDER — MINOCYCLINE HYDROCHLORIDE 100 MG/1
1 CAPSULE ORAL EVERY 12 HOURS SCHEDULED
COMMUNITY
Start: 2024-11-04

## 2025-01-31 NOTE — PROGRESS NOTES
Pulmonary follow-up office Visit      Patient Name: Lisa Crowley    Chief Complaint:    Chief Complaint   Patient presents with    Breathing Problem    Follow-up       Subjective: Lisa Crowley is a 79 y.o. female who is here today for follow-up on COPD.    Since last clinic visit, she was admitted in December 2024 for pneumonia for 3-4 days.  She was able to get better and then got sick again earlier this week and had to go to the ER.  He was discharged on doxycycline and a prednisone pack, but has a prescription of oral prednisone 20 mg daily to start afterwards for a prolonged taper by her PCP.    She states that she feels like she has been getting sick at least once every 3-month months, but admits that she stopped using Trelegy 200 a couple months ago because the cost was too much for her.  Cost was about $45 a month.  However, no other inhalers are cheaper for her.    Duration: COPD diagnosed > 5 years ago  Severity: severe with frequent exacerbations on symptoms  associated Symptoms: Increased cough, wheezing over the last several days, but slowly improving with antibiotics and steroids   timing: daily  Exacerbating Factors: exertion, current exacerbation   relieving Factors: rest and inhalers    Current Regimen:   Inhalers: Albuterol, nebs   nebs: duo-nebs several times per day    Supplemental Oxygen: On 2 L nasal cannula     Subjective      Review of Systems:   Review of Systems   Constitutional:  Positive for fatigue. Negative for fever.   Respiratory:  Positive for cough, shortness of breath and wheezing.        Social History:   Social History     Socioeconomic History    Marital status:    Tobacco Use    Smoking status: Never    Smokeless tobacco: Never   Vaping Use    Vaping status: Never Used   Substance and Sexual Activity    Alcohol use: Never    Drug use: Never    Sexual activity: Not Currently     Partners: Female     Birth control/protection: Surgical        Medications:     Current Outpatient Medications:     albuterol (PROVENTIL) (2.5 MG/3ML) 0.083% nebulizer solution, Albuterol Sulfate (2.5 MG/3ML) 0.083% Inhalation Nebulization Solution; Patient Sig: Albuterol Sulfate (2.5 MG/3ML) 0.083% Inhalation Nebulization Solution USE 1 UNIT DOSE EVERY 4-6 HOURS AS NEEDED FOR WHEEZING .; 0; 09-Apr-2014; Active, Disp: , Rfl:     albuterol sulfate  (90 Base) MCG/ACT inhaler, Inhale 1 puff 4 (Four) Times a Day., Disp: 17 g, Rfl: 5    alendronate (FOSAMAX) 70 MG tablet, take 1 tablet (70 mg) by mouth once weekly in the morning, at least 30 min before first food, beverage, or medication of day, Disp: , Rfl:     bumetanide (BUMEX) 2 MG tablet, Take 2 tablets by mouth Daily. 2 tablets daily, Disp: , Rfl:     buPROPion XL (WELLBUTRIN XL) 300 MG 24 hr tablet, Take 1 tablet by mouth Daily., Disp: , Rfl:     Calcium Carb-Cholecalciferol 600-10 MG-MCG chewable tablet, Chew Daily., Disp: , Rfl:     carvedilol (COREG) 25 MG tablet, Take 1 tablet by mouth 2 (Two) Times a Day With Meals., Disp: , Rfl:     citalopram (CeleXA) 40 MG tablet, Take 1 tablet by mouth Daily., Disp: , Rfl:     doxycycline (VIBRAMYICN) 100 MG tablet, Take 1 tablet by mouth Every 12 (Twelve) Hours., Disp: , Rfl:     hydrALAZINE (APRESOLINE) 10 MG tablet, Take 1 tablet by mouth., Disp: , Rfl:     ipratropium-albuterol (DUO-NEB) 0.5-2.5 mg/3 ml nebulizer, inhale 3 milliliters by mouth via nebulizer 4 times per day, Disp: , Rfl:     losartan (COZAAR) 100 MG tablet, Take 1 tablet by mouth Daily., Disp: , Rfl:     montelukast (SINGULAIR) 10 MG tablet, Take 1 tablet every day by mouth at bedtime., Disp: , Rfl:     O2 (OXYGEN), Inhale 2 L/min., Disp: , Rfl:     pain patient supplied pump, by Other route., Disp: , Rfl:     potassium chloride (KLOR-CON M20) 20 MEQ CR tablet, Take 1 tablet by mouth Daily., Disp: , Rfl:     pramipexole (MIRAPEX) 1 MG tablet, Take 1 tablet every day by mouth at bedtime for 90 days.,  "Disp: , Rfl:     predniSONE (DELTASONE) 10 MG (21) dose pack, , Disp: , Rfl:     predniSONE (DELTASONE) 20 MG tablet, Take 2 tablets by mouth Daily., Disp: , Rfl:     QUEtiapine (SEROquel) 100 MG tablet, Take 1 tablet by mouth Every Night., Disp: , Rfl:     roflumilast (Daliresp) 250 MCG tablet tablet, Take 1 tablet by mouth Daily., Disp: 28 tablet, Rfl: 3    rOPINIRole (REQUIP) 3 MG tablet, Take 1 tablet by mouth 4 (Four) Times a Day., Disp: , Rfl:     triamcinolone (KENALOG) 0.1 % ointment, 2 (Two) Times a Day., Disp: , Rfl:     triamcinolone (KENALOG) 0.5 % ointment, APPLY TO THE AFFECTED AREA(S) TWICE DAILY FOR 2 WEEKS, Disp: , Rfl:     Fluticasone-Umeclidin-Vilant (TRELEGY ELLIPTA) 200-62.5-25 MCG/ACT inhaler, Inhale 1 puff Daily for 90 days., Disp: 3 each, Rfl: 3    levoFLOXacin (LEVAQUIN) 750 MG tablet, Take 1 tablet every day by oral route for 10 days. (Patient not taking: Reported on 1/31/2025), Disp: , Rfl:     minocycline (MINOCIN,DYNACIN) 100 MG capsule, Take 1 capsule by mouth Every 12 (Twelve) Hours. (Patient not taking: Reported on 1/31/2025), Disp: , Rfl:     Allergies:   Allergies   Allergen Reactions    Adhesive Tape Rash and Unknown - Low Severity     \"surgical tape\"       Objective     Physical Exam:  Vital Signs:   Vitals:    01/31/25 1308   BP: 142/86   Pulse: 74   Resp: 18   SpO2: 91%  Comment: on RA   Weight: 83.9 kg (185 lb)   Height: 149.9 cm (59\")       Physical Exam  Constitutional:       Appearance: She is not toxic-appearing.      Comments: Pleasant on exam, able to speak in complete sentences   HENT:      Right Ear: External ear normal.      Left Ear: External ear normal.      Nose: No congestion or rhinorrhea.      Mouth/Throat:      Mouth: Mucous membranes are moist.      Pharynx: No oropharyngeal exudate or posterior oropharyngeal erythema.   Eyes:      General:         Right eye: No discharge.         Left eye: No discharge.      Extraocular Movements: Extraocular movements intact. " "  Cardiovascular:      Rate and Rhythm: Normal rate and regular rhythm.      Heart sounds: No murmur heard.  Pulmonary:      Breath sounds: Wheezing present. No rhonchi.      Comments: Good air movement, but scattered wheezing throughout  Abdominal:      Palpations: Abdomen is soft.   Musculoskeletal:      Cervical back: Normal range of motion.      Right lower leg: Edema present.      Left lower leg: Edema present.      Comments: Bilateral compression socks in place   Lymphadenopathy:      Cervical: No cervical adenopathy.   Skin:     Findings: No rash.   Neurological:      General: No focal deficit present.      Mental Status: She is alert.   Psychiatric:         Behavior: Behavior normal.         Thought Content: Thought content normal.         Judgment: Judgment normal.       Results Review:   Labs: Reviewed.    April 2024 WBC 7, hemoglobin 10.6, platelets 171, sodium 137, potassium 4.3, chloride 101, bicarb 30, creatinine 1.2.  April 2024 ABG showed 7.41/59/79/37    Lab Results   Component Value Date    WBC 6.21 04/04/2022    HGB 11.3 (L) 04/04/2022    HCT 35.1 04/04/2022    MCV 95.6 04/04/2022     04/04/2022     Lab Results   Component Value Date    GLUCOSE 120 (H) 08/06/2014    BUN 25 (H) 08/06/2014    CREATININE 1.1 08/06/2014    EGFR 50 08/06/2014    K 3.9 04/04/2022    CO2 34 (H) 08/06/2014    CALCIUM 9.1 08/06/2014 November 2020 sodium 139, potassium 3.8, chloride 100, bicarb 29, creatinine 1.1, alk phos 73, ALT 28, AST 23    No results found for: \"CBCDIF\", \"CMP\"     Micro: As of January 31, 2025   No results found for: \"RESPCX\"  No results found for: \"BLOODCX\"  No results found for: \"URINECX\"  No results found for: \"MRSACX\"  Lab Results   Component Value Date    MRSAPCR Positive (A) 04/04/2022     No results found for: \"URCX\"  No components found for: \"LOWRESPCF\"  No results found for: \"THROATCX\"  No results found for: \"CULTURES\"  No components found for: \"STREPBCX\"  No results found for: " "\"STREPPNEUAG\"  No results found for: \"LEGIONELLA\"  No results found for: \"MYCOPLASCX\"  No results found for: \"GCCX\"  No results found for: \"WOUNDCX\"  No results found for: \"BODYFLDCX\"    ABG: No results found for: \"PHART\", \"SAP2EEI\", \"PO2ART\", \"HGBBG\", \"J2RVNGAK\", \"CFIO2\", \"FCOHB\", \"CARBOXYHGB\", \"FMETHB\"    Echo:     April 2024 EF 60 to 65%, LV normal size and function.    Radiology Scans:   Last CT scan was reviewed in great detail with the patient.     April 2024 CT chest showed no lymphadenopathy.  Complete atelectasis of right middle lobe with small pleural effusion and atelectasis in the left costophrenic sulcus.    December 2023 CT scan of the chest showed no lymphadenopathy or effusions.  Subpleural right lower lobe 6 mm nodule which is unchanged when compared to November 2023.  Several less than 5 mm right lower lobe nodules are also stable.  Improving groundglass opacities.      PFT IMPRESSION:   May 2024 PFT showed FEV1 53%, FEV1/FVC ratio 59.  Significant bronchodilator responsiveness noted.  DL/%.  %.  January 2023 PFT showed FEV1 64%, FEV1/FVC ratio 68.  No significant bronchodilator responsiveness.  TLC 97%.  Air-trapping noted.  DL/VA 95%.    Assessment / Plan      Assessment/Plan:    1. Stage 3 severe COPD by GOLD classification  Severe COPD based on symptoms and has frequent exacerbations.  Unclear how much of this is related to worsening underlying lung function versus noncompliance with controller medications due to cost.  Advised to complete her steroids and antibiotics from current exacerbation.  Plan for repeat PFTs at next clinic visit.  May need to consider prophylactic antibiotics monthly to prevent exacerbations/infections.  However, discussed with her the need to be on controller medications to prevent symptoms and to keep her underlying illness under good control.  Trelegy samples given in clinic.  [facility] paperwork for patient assistance also given to patient.    - Complete PFT " - Pre & Post Bronchodilator; Future    2. MYRA (obstructive sleep apnea)  Unable to tolerate CPAP or trilogy    3. Chronic respiratory failure with hypercapnia  Unable to tolerate Trilogy    4. Atelectasis  No recent imaging.  Declined during recent ER visit.  Discussed that we may consider repeat imaging after she improves from current exacerbation.  States she will think about it.      Follow Up:   Return in about 2 months (around 3/31/2025) for PFT day of clinic appointment.    Ly Seth MD  Pulmonary/Critical Care Physician   [facility]      Please note that portions of this note may have been completed with a voice recognition program. Efforts were made to edit the dictations, but occasionally words are mistranscribed.

## 2025-03-07 ENCOUNTER — OFFICE VISIT (OUTPATIENT)
Dept: PULMONOLOGY | Facility: CLINIC | Age: 80
End: 2025-03-07
Payer: MEDICARE

## 2025-03-07 VITALS
HEIGHT: 59 IN | DIASTOLIC BLOOD PRESSURE: 68 MMHG | BODY MASS INDEX: 37.29 KG/M2 | WEIGHT: 185 LBS | RESPIRATION RATE: 18 BRPM | OXYGEN SATURATION: 93 % | SYSTOLIC BLOOD PRESSURE: 118 MMHG | HEART RATE: 63 BPM

## 2025-03-07 DIAGNOSIS — J96.12 CHRONIC RESPIRATORY FAILURE WITH HYPERCAPNIA: ICD-10-CM

## 2025-03-07 DIAGNOSIS — J44.9 STAGE 3 SEVERE COPD BY GOLD CLASSIFICATION: Primary | ICD-10-CM

## 2025-03-07 DIAGNOSIS — G47.33 OSA (OBSTRUCTIVE SLEEP APNEA): ICD-10-CM

## 2025-03-07 PROCEDURE — 99214 OFFICE O/P EST MOD 30 MIN: CPT | Performed by: INTERNAL MEDICINE

## 2025-03-07 RX ORDER — ENSIFENTRINE 3 MG/2.5ML
3 SUSPENSION RESPIRATORY (INHALATION) 2 TIMES DAILY
Qty: 150 ML | Refills: 5
Start: 2025-03-07

## 2025-03-07 RX ORDER — AZITHROMYCIN 250 MG/1
TABLET, FILM COATED ORAL
COMMUNITY
Start: 2025-03-01 | End: 2025-03-07

## 2025-03-07 RX ORDER — FLUCONAZOLE 150 MG/1
TABLET ORAL
COMMUNITY
Start: 2025-03-05 | End: 2025-03-07

## 2025-03-07 RX ORDER — MOLNUPIRAVIR 200 MG/1
CAPSULE ORAL
COMMUNITY
Start: 2025-02-24 | End: 2025-03-07

## 2025-03-07 RX ORDER — SULFAMETHOXAZOLE AND TRIMETHOPRIM 800; 160 MG/1; MG/1
1 TABLET ORAL EVERY 12 HOURS SCHEDULED
COMMUNITY
Start: 2025-02-24 | End: 2025-03-07

## 2025-03-07 RX ORDER — GUAIFENESIN 1200 MG/1
1 TABLET, EXTENDED RELEASE ORAL EVERY 12 HOURS SCHEDULED
COMMUNITY
Start: 2025-03-05

## 2025-03-07 RX ORDER — ALBUTEROL SULFATE 90 UG/1
1 INHALANT RESPIRATORY (INHALATION)
Qty: 17 G | Refills: 5 | Status: SHIPPED | OUTPATIENT
Start: 2025-03-07

## 2025-03-07 RX ORDER — ROFLUMILAST 250 UG/1
250 TABLET ORAL DAILY
Qty: 28 TABLET | Refills: 3 | Status: SHIPPED | OUTPATIENT
Start: 2025-03-07

## 2025-03-07 RX ORDER — GUAIFENESIN 600 MG/1
1 TABLET, EXTENDED RELEASE ORAL EVERY 12 HOURS SCHEDULED
COMMUNITY
Start: 2025-01-30

## 2025-03-07 NOTE — PROGRESS NOTES
Pulmonary follow-up office Visit      Patient Name: Lisa Crowley    Chief Complaint:    Chief Complaint   Patient presents with    Breathing Problem    Follow-up       Subjective: Lisa Crowley is a 79 y.o. female who is here today for follow-up on COPD.    Since last clinic visit, she was admitted February 2025 secondary to COPD exacerbation causing hypoxic and hypercapnic respiratory failure.  No evidence of pneumonia, but was treated with azithromycin and steroids for COPD exacerbation.  They were able to get her qualified for NIV at hospital discharge, but is waiting for it to be delivered from [facility].      She is using Trelegy inhaler and was approved for patient assistance.  She feels like this is helping some.    Duration: COPD diagnosed > 5 years ago  Severity: severe with frequent exacerbations   associated Symptoms: Chronic cough, intermittent wheezing, states she feels like she always has congestion and needs something to just break it up, but admits that this does not change with antibiotics and steroids   timing: daily  Exacerbating Factors: exertion, recent exacerbation   relieving Factors: rest and inhalers    Current Regimen:   Inhalers: Trelegy, albuterol as needed  nebs: duo-nebs as needed    Supplemental Oxygen: On 2 L nasal cannula     Subjective      Review of Systems:   Review of Systems   Constitutional:  Positive for fatigue. Negative for fever.        Chronic fatigue   Respiratory:  Positive for cough and shortness of breath.    Neurological:  Positive for weakness.       Social History:   Social History     Socioeconomic History    Marital status:    Tobacco Use    Smoking status: Never    Smokeless tobacco: Never   Vaping Use    Vaping status: Never Used   Substance and Sexual Activity    Alcohol use: Never    Drug use: Never    Sexual activity: Not Currently     Partners: Female     Birth control/protection: Surgical       Medications:     Current Outpatient  Medications:     albuterol (PROVENTIL) (2.5 MG/3ML) 0.083% nebulizer solution, Albuterol Sulfate (2.5 MG/3ML) 0.083% Inhalation Nebulization Solution; Patient Sig: Albuterol Sulfate (2.5 MG/3ML) 0.083% Inhalation Nebulization Solution USE 1 UNIT DOSE EVERY 4-6 HOURS AS NEEDED FOR WHEEZING .; 0; 09-Apr-2014; Active, Disp: , Rfl:     albuterol sulfate  (90 Base) MCG/ACT inhaler, Inhale 1 puff 4 (Four) Times a Day., Disp: 17 g, Rfl: 5    alendronate (FOSAMAX) 70 MG tablet, take 1 tablet (70 mg) by mouth once weekly in the morning, at least 30 min before first food, beverage, or medication of day, Disp: , Rfl:     amoxicillin-clavulanate (AUGMENTIN) 875-125 MG per tablet, Take 1 tablet by mouth Every 12 (Twelve) Hours., Disp: , Rfl:     bumetanide (BUMEX) 2 MG tablet, Take 2 tablets by mouth Daily. 2 tablets daily, Disp: , Rfl:     buPROPion XL (WELLBUTRIN XL) 300 MG 24 hr tablet, Take 1 tablet by mouth Daily., Disp: , Rfl:     Calcium Carb-Cholecalciferol 600-10 MG-MCG chewable tablet, Chew Daily., Disp: , Rfl:     carvedilol (COREG) 25 MG tablet, Take 1 tablet by mouth 2 (Two) Times a Day With Meals., Disp: , Rfl:     citalopram (CeleXA) 40 MG tablet, Take 1 tablet by mouth Daily., Disp: , Rfl:     Fluticasone-Umeclidin-Vilant (TRELEGY ELLIPTA) 200-62.5-25 MCG/ACT inhaler, Inhale 1 puff Daily for 90 days., Disp: , Rfl:     FT Mucus Relief 12HR 600 MG 12 hr tablet, Take 1 tablet by mouth Every 12 (Twelve) Hours., Disp: , Rfl:     guaiFENesin ER 1200 MG tablet sustained-release 12 hour, Take 1 tablet by mouth Every 12 (Twelve) Hours., Disp: , Rfl:     hydrALAZINE (APRESOLINE) 10 MG tablet, Take 1 tablet by mouth., Disp: , Rfl:     ipratropium-albuterol (DUO-NEB) 0.5-2.5 mg/3 ml nebulizer, inhale 3 milliliters by mouth via nebulizer 4 times per day, Disp: , Rfl:     losartan (COZAAR) 100 MG tablet, Take 1 tablet by mouth Daily., Disp: , Rfl:     minocycline (MINOCIN,DYNACIN) 100 MG capsule, Take 1 capsule by mouth  "Every 12 (Twelve) Hours., Disp: , Rfl:     montelukast (SINGULAIR) 10 MG tablet, Take 1 tablet every day by mouth at bedtime., Disp: , Rfl:     O2 (OXYGEN), Inhale 2 L/min., Disp: , Rfl:     pain patient supplied pump, by Other route., Disp: , Rfl:     potassium chloride (KLOR-CON M20) 20 MEQ CR tablet, Take 1 tablet by mouth Daily., Disp: , Rfl:     pramipexole (MIRAPEX) 1 MG tablet, Take 1 tablet every day by mouth at bedtime for 90 days., Disp: , Rfl:     QUEtiapine (SEROquel) 100 MG tablet, Take 1 tablet by mouth Every Night., Disp: , Rfl:     roflumilast (Daliresp) 250 MCG tablet tablet, Take 1 tablet by mouth Daily., Disp: 28 tablet, Rfl: 3    rOPINIRole (REQUIP) 3 MG tablet, Take 1 tablet by mouth 4 (Four) Times a Day., Disp: , Rfl:     triamcinolone (KENALOG) 0.1 % ointment, 2 (Two) Times a Day., Disp: , Rfl:     triamcinolone (KENALOG) 0.5 % ointment, APPLY TO THE AFFECTED AREA(S) TWICE DAILY FOR 2 WEEKS, Disp: , Rfl:     Ensifentrine (Ohtuvayre) 3 MG/2.5ML suspension, Inhale 3 mg 2 (Two) Times a Day., Disp: 150 mL, Rfl: 5    Allergies:   Allergies   Allergen Reactions    Adhesive Tape Rash and Unknown - Low Severity     \"surgical tape\"       Objective     Physical Exam:  Vital Signs:   Vitals:    03/07/25 1008   BP: 118/68   Pulse: 63   Resp: 18   SpO2: 93%  Comment: on RA   Weight: 83.9 kg (185 lb)   Height: 149.9 cm (59\")       Physical Exam  Constitutional:       Appearance: She is not toxic-appearing.      Comments: Pleasant on exam, able to speak in complete sentences   HENT:      Right Ear: External ear normal.      Left Ear: External ear normal.      Nose: No congestion or rhinorrhea.      Mouth/Throat:      Mouth: Mucous membranes are moist.      Pharynx: No oropharyngeal exudate or posterior oropharyngeal erythema.   Eyes:      General:         Right eye: No discharge.         Left eye: No discharge.      Extraocular Movements: Extraocular movements intact.   Cardiovascular:      Rate and Rhythm: " "Normal rate and regular rhythm.      Heart sounds: No murmur heard.  Pulmonary:      Breath sounds: Wheezing present. No rhonchi.      Comments: Good air movement, but scattered wheezing throughout  Abdominal:      Palpations: Abdomen is soft.   Musculoskeletal:      Cervical back: Normal range of motion.      Right lower leg: Edema present.      Left lower leg: Edema present.      Comments: Chronic bilateral lower extremity edema that is 1-2+   Lymphadenopathy:      Cervical: No cervical adenopathy.   Skin:     Findings: No rash.   Neurological:      General: No focal deficit present.      Mental Status: She is alert.   Psychiatric:         Behavior: Behavior normal.         Thought Content: Thought content normal.         Judgment: Judgment normal.       Results Review:   Labs: Reviewed.  February 2025 sodium 138, creatinine 1.7, hematocrit 27, platelets 255    April 2024 WBC 7, hemoglobin 10.6, platelets 171, sodium 137, potassium 4.3, chloride 101, bicarb 30, creatinine 1.2.  April 2024 ABG showed 7.41/59/79/37    Lab Results   Component Value Date    WBC 6.21 04/04/2022    HGB 11.3 (L) 04/04/2022    HCT 35.1 04/04/2022    MCV 95.6 04/04/2022     04/04/2022     Lab Results   Component Value Date    GLUCOSE 120 (H) 08/06/2014    BUN 25 (H) 08/06/2014    CREATININE 1.1 08/06/2014    EGFR 50 08/06/2014    K 3.9 04/04/2022    CO2 34 (H) 08/06/2014    CALCIUM 9.1 08/06/2014 November 2020 sodium 139, potassium 3.8, chloride 100, bicarb 29, creatinine 1.1, alk phos 73, ALT 28, AST 23    No results found for: \"CBCDIF\", \"CMP\"     Micro: As of March 7, 2025   No results found for: \"RESPCX\"  No results found for: \"BLOODCX\"  No results found for: \"URINECX\"  No results found for: \"MRSACX\"  Lab Results   Component Value Date    MRSAPCR Positive (A) 04/04/2022     No results found for: \"URCX\"  No components found for: \"LOWRESPCF\"  No results found for: \"THROATCX\"  No results found for: \"CULTURES\"  No components " "found for: \"STREPBCX\"  No results found for: \"STREPPNEUAG\"  No results found for: \"LEGIONELLA\"  No results found for: \"MYCOPLASCX\"  No results found for: \"GCCX\"  No results found for: \"WOUNDCX\"  No results found for: \"BODYFLDCX\"    ABG: No results found for: \"PHART\", \"OET1KHD\", \"PO2ART\", \"HGBBG\", \"C4OLQRYO\", \"CFIO2\", \"FCOHB\", \"CARBOXYHGB\", \"FMETHB\"    Echo:     April 2024 EF 60 to 65%, LV normal size and function.    Radiology Scans:   Last CT scan was reviewed in great detail with the patient.     February 2025 CT PE protocol showed no pulmonary emboli.  No acute infiltrates.  Mild bibasilar atelectasis noted.  No lymphadenopathy.    April 2024 CT chest showed no lymphadenopathy.  Complete atelectasis of right middle lobe with small pleural effusion and atelectasis in the left costophrenic sulcus.    December 2023 CT scan of the chest showed no lymphadenopathy or effusions.  Subpleural right lower lobe 6 mm nodule which is unchanged when compared to November 2023.  Several less than 5 mm right lower lobe nodules are also stable.  Improving groundglass opacities.      PFT IMPRESSION:   May 2024 PFT showed FEV1 53%, FEV1/FVC ratio 59.  Significant bronchodilator responsiveness noted.  DL/%.  %.    January 2023 PFT showed FEV1 64%, FEV1/FVC ratio 68.  No significant bronchodilator responsiveness.  TLC 97%.  Air-trapping noted.  DL/VA 95%.    Assessment / Plan      Assessment/Plan:    1. Stage 3 severe COPD by GOLD classification  Patient with what appears to be end-stage COPD and will be due for repeat PFTs at next clinic visit.  Patient on triple therapy and despite this continues to have repeated exacerbations.  We had a long discussion that antibiotics will not help her unless she has an actual infection, but suspect it is being used many times just to treat her underlying COPD symptoms.    Will try adding Ohtuvayre to her regimen to see if this can help improve symptoms.    - Ensifentrine (Ohtuvayre) " 3 MG/2.5ML suspension; Inhale 3 mg 2 (Two) Times a Day.  Dispense: 150 mL; Refill: 5  - Fluticasone-Umeclidin-Vilant (TRELEGY ELLIPTA) 200-62.5-25 MCG/ACT inhaler; Inhale 1 puff Daily for 90 days.  - albuterol sulfate  (90 Base) MCG/ACT inhaler; Inhale 1 puff 4 (Four) Times a Day.  Dispense: 17 g; Refill: 5  - roflumilast (Daliresp) 250 MCG tablet tablet; Take 1 tablet by mouth Daily.  Dispense: 28 tablet; Refill: 3  - Complete PFT - Pre & Post Bronchodilator; Future    2. MYRA (obstructive sleep apnea)  Unable to tolerate PAP therapy    3. Chronic respiratory failure with hypercapnia  Request recent ABG from hospitalization.  Advised her to use NIV machine when it arrives.      Follow Up:   Return in about 4 months (around 7/7/2025) for PFT day of clinic appointment.    Ly Seth MD  Pulmonary/Critical Care Physician   [facility]      Please note that portions of this note may have been completed with a voice recognition program. Efforts were made to edit the dictations, but occasionally words are mistranscribed.

## (undated) DEVICE — TOOL MR8-14MH30D MR8 14CM MATCH DMD 3MM: Brand: MIDAS REX MR8

## (undated) DEVICE — HDRST INTUB GENTLETOUCH SLOT 7IN RT

## (undated) DEVICE — ACCY PA700 LUBRICANT DIFFUSER MR7 4 PACK: Brand: MIDAS REX

## (undated) DEVICE — ANTIBACTERIAL UNDYED BRAIDED (POLYGLACTIN 910), SYNTHETIC ABSORBABLE SUTURE: Brand: COATED VICRYL

## (undated) DEVICE — GLV SURG PREMIERPRO MIC LTX PF SZ6.5 BRN

## (undated) DEVICE — SUT VIC PLS CTD ANTIB BR 3/0 8/18IN 45CM

## (undated) DEVICE — BLANKT WARM UPPR/BDY ARM/OUT 57X196CM

## (undated) DEVICE — ELECTRD BLD EZ CLN STD 2.5IN

## (undated) DEVICE — BRAIN SPATULA, 7 7/8", (200 MM), DOUBLE ENDED, MALLEABLE, WIDTH: 10 MM, SILICONE, STERILE, DISPOSABLE, PACKAGE OF 10 PIECES: Brand: AESCULAP

## (undated) DEVICE — UNDERGLV SURG BIOGEL INDICAT PF 61/2 GRN

## (undated) DEVICE — GLV SURG PREMIERPRO MIC LTX PF SZ7.5 BRN

## (undated) DEVICE — SUT SILK 2/0 SH CR8 18IN CR8 C012D

## (undated) DEVICE — PATIENT RETURN ELECTRODE, SINGLE-USE, CONTACT QUALITY MONITORING, ADULT, WITH 9FT CORD, FOR PATIENTS WEIGING OVER 33LBS. (15KG): Brand: MEGADYNE

## (undated) DEVICE — PK NEURO DISC 10

## (undated) DEVICE — ELECTRD BLD EZ CLN MOD 4IN

## (undated) DEVICE — PENCL ROCKRSWCH MEGADYNE W/HOLSTR 10FT SS

## (undated) DEVICE — STRAP POSTN KN/BDY FM 5X72IN DISP

## (undated) DEVICE — IRRIGATOR BULB ASEPTO 60CC STRL

## (undated) DEVICE — DRSNG WND BORDR/ADHS NONADHR/GZ LF 4X4IN STRL

## (undated) DEVICE — SUT VIC 3/0 PS2 27IN J427H

## (undated) DEVICE — GLV SURG PREMIERPRO MIC LTX PF SZ7 BRN

## (undated) DEVICE — SNAP KOVER: Brand: UNBRANDED

## (undated) DEVICE — ADHS SKIN PREMIERPRO EXOFIN TOPICAL HI/VISC .5ML

## (undated) DEVICE — APPL CHLORAPREP TINTED 26ML TEAL